# Patient Record
Sex: FEMALE | Race: WHITE | NOT HISPANIC OR LATINO | Employment: FULL TIME | URBAN - METROPOLITAN AREA
[De-identification: names, ages, dates, MRNs, and addresses within clinical notes are randomized per-mention and may not be internally consistent; named-entity substitution may affect disease eponyms.]

---

## 2017-03-10 ENCOUNTER — OFFICE VISIT (OUTPATIENT)
Dept: FAMILY MEDICINE | Facility: CLINIC | Age: 42
End: 2017-03-10
Payer: COMMERCIAL

## 2017-03-10 VITALS
WEIGHT: 114 LBS | OXYGEN SATURATION: 97 % | HEIGHT: 61 IN | DIASTOLIC BLOOD PRESSURE: 60 MMHG | BODY MASS INDEX: 21.52 KG/M2 | SYSTOLIC BLOOD PRESSURE: 102 MMHG | TEMPERATURE: 99 F | HEART RATE: 77 BPM

## 2017-03-10 DIAGNOSIS — G25.81 RESTLESS LEG: ICD-10-CM

## 2017-03-10 DIAGNOSIS — G25.81 RESTLESS LEG SYNDROME: Primary | ICD-10-CM

## 2017-03-10 PROCEDURE — 99999 PR PBB SHADOW E&M-EST. PATIENT-LVL III: CPT | Mod: PBBFAC,,, | Performed by: FAMILY MEDICINE

## 2017-03-10 PROCEDURE — 1160F RVW MEDS BY RX/DR IN RCRD: CPT | Mod: S$GLB,,, | Performed by: FAMILY MEDICINE

## 2017-03-10 PROCEDURE — 99214 OFFICE O/P EST MOD 30 MIN: CPT | Mod: S$GLB,,, | Performed by: FAMILY MEDICINE

## 2017-03-10 RX ORDER — ROPINIROLE 0.5 MG/1
0.5 TABLET, FILM COATED ORAL 2 TIMES DAILY PRN
Qty: 60 TABLET | Refills: 11 | Status: SHIPPED | OUTPATIENT
Start: 2017-03-10 | End: 2018-02-16 | Stop reason: SDUPTHER

## 2017-03-10 NOTE — PROGRESS NOTES
"Subjective:       Patient ID: Rosa Maria Kay is a 42 y.o. female.    Chief Complaint: Discuss Headaches    HPI Comments: patient is here f or refills of requip. She has restless leg and is still having leg twitching and waking up. She reports improvement in rest, however.     Within \[facility]\"" [facility] she has had headaches that wake her up at night. She states they cover her entire head. She states she is under stress. She goes to sleep around 930-10 and wakes up from 12-2 and goes back to 4. She has no nausea, vomiting. She states she doesn't have blurred vision. She has no photosensitivity or phonosensitivity. She states it feels like a squeezing headache.     Review of Systems    Objective:       Vitals:    03/10/17 1446   BP: 102/60   Pulse: 77   Temp: 98.5 °F (36.9 °C)   TempSrc: Oral   SpO2: 97%   Weight: 51.7 kg (113 lb 15.7 oz)   Height: 5' 1" (1.549 m)       Physical Exam   Constitutional: She is oriented to person, place, and time. She appears well-developed and well-nourished. No distress.   HENT:   Head: Normocephalic and atraumatic.   Neck: Normal range of motion. Neck supple.   Cardiovascular: Normal rate, regular rhythm and normal heart sounds.  Exam reveals no gallop and no friction rub.    No murmur heard.  Pulmonary/Chest: Effort normal and breath sounds normal. No respiratory distress. She has no wheezes. She has no rales.   Neurological: She is alert and oriented to person, place, and time.   Skin: She is not diaphoretic.   Psychiatric: She has a normal mood and affect.       Assessment:       1. Restless leg syndrome    2. Restless leg        Plan:       Rosa Maria was seen today for discuss headaches.    Diagnoses and all orders for this visit:    Restless leg syndrome    Restless leg  -     ropinirole (REQUIP) 0.5 MG tablet; Take 1 tablet (0.5 mg total) by mouth 2 (two) times daily as needed.             increase the Requip from .25 to .5 mg and take 2 pills staggered before bedtime x 1 week. If no " improvement in sleep, take 2 pills at once about a half hour before bedtime.

## 2017-06-16 ENCOUNTER — APPOINTMENT (OUTPATIENT)
Dept: RADIOLOGY | Facility: HOSPITAL | Age: 42
End: 2017-06-16
Attending: INTERNAL MEDICINE
Payer: COMMERCIAL

## 2017-06-16 ENCOUNTER — OFFICE VISIT (OUTPATIENT)
Dept: FAMILY MEDICINE | Facility: CLINIC | Age: 42
End: 2017-06-16
Payer: COMMERCIAL

## 2017-06-16 VITALS
HEIGHT: 61 IN | DIASTOLIC BLOOD PRESSURE: 60 MMHG | TEMPERATURE: 98 F | HEART RATE: 75 BPM | OXYGEN SATURATION: 99 % | WEIGHT: 108 LBS | SYSTOLIC BLOOD PRESSURE: 102 MMHG | BODY MASS INDEX: 20.39 KG/M2

## 2017-06-16 DIAGNOSIS — Z00.00 HEALTH CARE MAINTENANCE: ICD-10-CM

## 2017-06-16 DIAGNOSIS — M79.674 TOE PAIN, RIGHT: Primary | ICD-10-CM

## 2017-06-16 DIAGNOSIS — G62.9 NEUROPATHY: ICD-10-CM

## 2017-06-16 DIAGNOSIS — Z13.220 LIPID SCREENING: ICD-10-CM

## 2017-06-16 DIAGNOSIS — M79.674 TOE PAIN, RIGHT: ICD-10-CM

## 2017-06-16 PROCEDURE — 99213 OFFICE O/P EST LOW 20 MIN: CPT | Mod: S$GLB,,, | Performed by: INTERNAL MEDICINE

## 2017-06-16 PROCEDURE — 99999 PR PBB SHADOW E&M-EST. PATIENT-LVL III: CPT | Mod: PBBFAC,,, | Performed by: INTERNAL MEDICINE

## 2017-06-16 PROCEDURE — 73630 X-RAY EXAM OF FOOT: CPT | Mod: TC,PN,RT

## 2017-06-16 PROCEDURE — 73630 X-RAY EXAM OF FOOT: CPT | Mod: 26,RT,, | Performed by: RADIOLOGY

## 2017-06-16 RX ORDER — ADAPALENE GEL USP, 0.3% 3 MG/G
GEL TOPICAL
COMMUNITY
Start: 2017-05-24 | End: 2018-10-12

## 2017-06-16 NOTE — PROGRESS NOTES
SUBJECTIVE     Chief Complaint   Patient presents with    Foot Pain     right        HPI  Rosa Maria Kay is a 42 y.o. female with multiple medical diagnoses as listed in the medical history and problem list that presents for evaluation of R foot pain(R 2nd and 3rd toes) x 2 months. Pt says she is an avid walker. She has gotten to a point where she has to take several tabs of Advil(4 tabs/day) in order to prevent pain with ambulation. Pain is like needle sticking pain and sometimes shooting at a 6-7/10 and intermittent in nature. Pt has tried a wrap which makes the pain worse. Pt has also seen improvement with use of Asper cream. Pt denies any trauma, falls, or recent heavy lifting. Denies any swelling or skin discoloration.    PAST MEDICAL HISTORY:  Past Medical History:   Diagnosis Date    Interstitial cystitis     Dr. Wheeler       PAST SURGICAL HISTORY:  History reviewed. No pertinent surgical history.    SOCIAL HISTORY:  Social History     Social History    Marital status:      Spouse name: N/A    Number of children: N/A    Years of education: N/A     Occupational History    Not on file.     Social History Main Topics    Smoking status: Never Smoker    Smokeless tobacco: Not on file    Alcohol use No    Drug use: No    Sexual activity: Yes     Partners: Male     Other Topics Concern    Not on file     Social History Narrative    No narrative on file       FAMILY HISTORY:  Family History   Problem Relation Age of Onset    Cancer Mother 44     colon       ALLERGIES AND MEDICATIONS: updated and reviewed.  Review of patient's allergies indicates:  No Known Allergies  Current Outpatient Prescriptions   Medication Sig Dispense Refill    adapalene 0.3 % GlwP       flavoxate (URISPAS) 100 mg Tab Take 100 mg by mouth once daily.  12    pentosan polysulfate (ELMIRON) 100 mg Cap Take 100 mg by mouth 3 (three) times daily.      ropinirole (REQUIP) 0.5 MG tablet Take 1 tablet (0.5 mg total) by  "mouth 2 (two) times daily as needed. 60 tablet 11    UROGESIC-BLUE 81.6-40.8-0.12 mg Tab Take 1 tablet by mouth daily as needed.  11     No current facility-administered medications for this visit.        ROS  Review of Systems   Constitutional: Negative for chills and fever.   HENT: Negative for hearing loss and sore throat.    Eyes: Negative for visual disturbance.   Respiratory: Negative for cough and shortness of breath.    Cardiovascular: Negative for chest pain, palpitations and leg swelling.   Gastrointestinal: Negative for abdominal pain, constipation, diarrhea, nausea and vomiting.   Genitourinary: Negative for dysuria, frequency and urgency.   Musculoskeletal: Negative for arthralgias, joint swelling and myalgias.   Skin: Negative for rash and wound.   Neurological: Negative for headaches.   Psychiatric/Behavioral: Negative for agitation and confusion. The patient is not nervous/anxious.          OBJECTIVE     Physical Exam  Vitals:    06/16/17 1436   BP: 102/60   Pulse: 75   Temp: 98.3 °F (36.8 °C)    Body mass index is 20.41 kg/m².  Weight: 49 kg (108 lb 0.4 oz)   Height: 5' 1" (154.9 cm)     Physical Exam   Constitutional: She is oriented to person, place, and time. She appears well-developed and well-nourished. No distress.   HENT:   Head: Normocephalic and atraumatic.   Right Ear: External ear normal.   Left Ear: External ear normal.   Nose: Nose normal.   Mouth/Throat: Oropharynx is clear and moist.   Eyes: Conjunctivae and EOM are normal. Right eye exhibits no discharge. Left eye exhibits no discharge. No scleral icterus.   Neck: Normal range of motion. Neck supple. No JVD present. No tracheal deviation present.   Cardiovascular: Normal rate, regular rhythm and intact distal pulses.  Exam reveals no gallop and no friction rub.    No murmur heard.  Pulmonary/Chest: Effort normal and breath sounds normal. No respiratory distress. She has no wheezes.   Abdominal: Soft. Bowel sounds are normal. She " exhibits no distension and no mass. There is no tenderness. There is no rebound and no guarding.   Musculoskeletal: Normal range of motion. She exhibits no edema, tenderness or deformity.   Neurological: She is alert and oriented to person, place, and time. She exhibits normal muscle tone. Coordination normal.   Skin: Skin is warm and dry. No rash noted. No erythema.   Psychiatric: She has a normal mood and affect. Her behavior is normal. Judgment and thought content normal.         Health Maintenance       Date Due Completion Date    Lipid Panel 1975 ---    TETANUS VACCINE 01/30/1995 ---    Mammogram 07/07/2017 7/7/2015    Influenza Vaccine 08/01/2017 ---    Pap Smear with HPV Cotest 06/23/2018 6/23/2015            ASSESSMENT     42 y.o. female with     1. Toe pain, right    2. Lipid screening    3. Neuropathy    4. Health care maintenance        PLAN:     1. Toe pain, right  - Pain likely 2/2 neuropathy, but plan for foot xray to r/o any acute bony process  - Continue OTC NSAIDs(Aleve, Ibuprofen, Advil, or Motrin) prn pain    2. Lipid screening  - Lipid panel; Future    3. Neuropathy  - Likely cause of pt's R 2nd and 3rd toe pain; plan for workup with labs as below  - TSH; Future  - Vitamin B12; Future  - Folate; Future  - Protein electrophoresis, serum; Future  - Hemoglobin A1c; Future  - YINA; Future  - Sedimentation rate, manual; Future  - RPR; Future    4. Health care maintenance  - CBC auto differential; Future  - Comprehensive metabolic panel; Future        RTC in 2 weeks     Allie Mckeon MD  06/16/2017 2:42 PM        No Follow-up on file.

## 2017-06-20 ENCOUNTER — LAB VISIT (OUTPATIENT)
Dept: LAB | Facility: HOSPITAL | Age: 42
End: 2017-06-20
Attending: FAMILY MEDICINE
Payer: COMMERCIAL

## 2017-06-20 DIAGNOSIS — G62.9 NEUROPATHY: ICD-10-CM

## 2017-06-20 DIAGNOSIS — Z13.220 LIPID SCREENING: ICD-10-CM

## 2017-06-20 DIAGNOSIS — Z00.00 HEALTH CARE MAINTENANCE: ICD-10-CM

## 2017-06-20 LAB
ALBUMIN SERPL BCP-MCNC: 3.8 G/DL
ALP SERPL-CCNC: 51 U/L
ALT SERPL W/O P-5'-P-CCNC: 14 U/L
ANION GAP SERPL CALC-SCNC: 7 MMOL/L
AST SERPL-CCNC: 16 U/L
BASOPHILS # BLD AUTO: 0.02 K/UL
BASOPHILS NFR BLD: 0.6 %
BILIRUB SERPL-MCNC: 0.2 MG/DL
BUN SERPL-MCNC: 11 MG/DL
CALCIUM SERPL-MCNC: 9.7 MG/DL
CHLORIDE SERPL-SCNC: 106 MMOL/L
CHOLEST/HDLC SERPL: 2.1 {RATIO}
CO2 SERPL-SCNC: 26 MMOL/L
CREAT SERPL-MCNC: 0.9 MG/DL
DIFFERENTIAL METHOD: ABNORMAL
EOSINOPHIL # BLD AUTO: 0 K/UL
EOSINOPHIL NFR BLD: 0.3 %
ERYTHROCYTE [DISTWIDTH] IN BLOOD BY AUTOMATED COUNT: 15.6 %
ERYTHROCYTE [SEDIMENTATION RATE] IN BLOOD BY WESTERGREN METHOD: 25 MM/HR
EST. GFR  (AFRICAN AMERICAN): >60 ML/MIN/1.73 M^2
EST. GFR  (NON AFRICAN AMERICAN): >60 ML/MIN/1.73 M^2
FOLATE SERPL-MCNC: 16.1 NG/ML
GLUCOSE SERPL-MCNC: 81 MG/DL
HCT VFR BLD AUTO: 31.6 %
HDL/CHOLESTEROL RATIO: 48.6 %
HDLC SERPL-MCNC: 107 MG/DL
HDLC SERPL-MCNC: 220 MG/DL
HGB BLD-MCNC: 10.4 G/DL
LDLC SERPL CALC-MCNC: 105.8 MG/DL
LYMPHOCYTES # BLD AUTO: 1.5 K/UL
LYMPHOCYTES NFR BLD: 43.3 %
MCH RBC QN AUTO: 29.1 PG
MCHC RBC AUTO-ENTMCNC: 32.9 %
MCV RBC AUTO: 89 FL
MONOCYTES # BLD AUTO: 0.3 K/UL
MONOCYTES NFR BLD: 9.7 %
NEUTROPHILS # BLD AUTO: 1.6 K/UL
NEUTROPHILS NFR BLD: 45.8 %
NONHDLC SERPL-MCNC: 113 MG/DL
PLATELET # BLD AUTO: 252 K/UL
PMV BLD AUTO: 10.6 FL
POTASSIUM SERPL-SCNC: 4.2 MMOL/L
PROT SERPL-MCNC: 7.1 G/DL
RBC # BLD AUTO: 3.57 M/UL
SODIUM SERPL-SCNC: 139 MMOL/L
TRIGL SERPL-MCNC: 36 MG/DL
TSH SERPL DL<=0.005 MIU/L-ACNC: 1.61 UIU/ML
VIT B12 SERPL-MCNC: 1462 PG/ML
WBC # BLD AUTO: 3.49 K/UL

## 2017-06-20 PROCEDURE — 80061 LIPID PANEL: CPT

## 2017-06-20 PROCEDURE — 84443 ASSAY THYROID STIM HORMONE: CPT

## 2017-06-20 PROCEDURE — 82746 ASSAY OF FOLIC ACID SERUM: CPT

## 2017-06-20 PROCEDURE — 85025 COMPLETE CBC W/AUTO DIFF WBC: CPT

## 2017-06-20 PROCEDURE — 83036 HEMOGLOBIN GLYCOSYLATED A1C: CPT

## 2017-06-20 PROCEDURE — 80053 COMPREHEN METABOLIC PANEL: CPT

## 2017-06-20 PROCEDURE — 86038 ANTINUCLEAR ANTIBODIES: CPT

## 2017-06-20 PROCEDURE — 85651 RBC SED RATE NONAUTOMATED: CPT

## 2017-06-20 PROCEDURE — 86235 NUCLEAR ANTIGEN ANTIBODY: CPT | Mod: 59

## 2017-06-20 PROCEDURE — 84165 PROTEIN E-PHORESIS SERUM: CPT | Mod: 26,,, | Performed by: PATHOLOGY

## 2017-06-20 PROCEDURE — 36415 COLL VENOUS BLD VENIPUNCTURE: CPT

## 2017-06-20 PROCEDURE — 82607 VITAMIN B-12: CPT

## 2017-06-20 PROCEDURE — 86592 SYPHILIS TEST NON-TREP QUAL: CPT

## 2017-06-20 PROCEDURE — 86039 ANTINUCLEAR ANTIBODIES (ANA): CPT

## 2017-06-20 PROCEDURE — 84165 PROTEIN E-PHORESIS SERUM: CPT

## 2017-06-21 ENCOUNTER — PATIENT MESSAGE (OUTPATIENT)
Dept: FAMILY MEDICINE | Facility: CLINIC | Age: 42
End: 2017-06-21

## 2017-06-21 LAB
ALBUMIN SERPL ELPH-MCNC: 4.12 G/DL
ALPHA1 GLOB SERPL ELPH-MCNC: 0.24 G/DL
ALPHA2 GLOB SERPL ELPH-MCNC: 0.5 G/DL
ANA SER QL IF: POSITIVE
ANA TITR SER IF: NORMAL {TITER}
B-GLOBULIN SERPL ELPH-MCNC: 0.8 G/DL
ESTIMATED AVG GLUCOSE: 111 MG/DL
GAMMA GLOB SERPL ELPH-MCNC: 1.24 G/DL
HBA1C MFR BLD HPLC: 5.5 %
PROT SERPL-MCNC: 6.9 G/DL
RPR SER QL: NORMAL

## 2017-06-22 LAB — PATHOLOGIST INTERPRETATION SPE: NORMAL

## 2017-06-23 DIAGNOSIS — R76.8 ANA POSITIVE: Primary | ICD-10-CM

## 2017-06-23 DIAGNOSIS — D64.9 NORMOCYTIC ANEMIA: ICD-10-CM

## 2017-06-23 LAB
ANTI SM ANTIBODY: 1.12 EU
ANTI SM/RNP ANTIBODY: 0.62 EU
ANTI-SM INTERPRETATION: NEGATIVE
ANTI-SM/RNP INTERPRETATION: NEGATIVE
ANTI-SSA ANTIBODY: 1.5 EU
ANTI-SSA INTERPRETATION: NEGATIVE
ANTI-SSB ANTIBODY: 0.29 EU
ANTI-SSB INTERPRETATION: NEGATIVE
DSDNA AB SER-ACNC: NORMAL [IU]/ML

## 2017-06-27 ENCOUNTER — TELEPHONE (OUTPATIENT)
Dept: FAMILY MEDICINE | Facility: CLINIC | Age: 42
End: 2017-06-27

## 2017-06-27 NOTE — TELEPHONE ENCOUNTER
----- Message from Grace Hart sent at 6/27/2017  3:44 PM CDT -----  REFERRAL: Pt scheduled to see Dr. Weeks on 9/12/17 @ 3pm.

## 2017-06-30 ENCOUNTER — LAB VISIT (OUTPATIENT)
Dept: LAB | Facility: HOSPITAL | Age: 42
End: 2017-06-30
Attending: FAMILY MEDICINE
Payer: COMMERCIAL

## 2017-06-30 DIAGNOSIS — D64.9 ANEMIA, UNSPECIFIED: Primary | ICD-10-CM

## 2017-06-30 DIAGNOSIS — D64.9 ANEMIA, UNSPECIFIED: ICD-10-CM

## 2017-06-30 LAB
FERRITIN SERPL-MCNC: 7 NG/ML
FOLATE SERPL-MCNC: 14.8 NG/ML
IRON SERPL-MCNC: 43 UG/DL
SATURATED IRON: 9 %
TOTAL IRON BINDING CAPACITY: 475 UG/DL
TRANSFERRIN SERPL-MCNC: 321 MG/DL
VIT B12 SERPL-MCNC: 1065 PG/ML

## 2017-06-30 PROCEDURE — 82728 ASSAY OF FERRITIN: CPT

## 2017-06-30 PROCEDURE — 36415 COLL VENOUS BLD VENIPUNCTURE: CPT

## 2017-06-30 PROCEDURE — 84466 ASSAY OF TRANSFERRIN: CPT

## 2017-06-30 PROCEDURE — 83540 ASSAY OF IRON: CPT

## 2017-06-30 PROCEDURE — 82746 ASSAY OF FOLIC ACID SERUM: CPT

## 2017-06-30 PROCEDURE — 82607 VITAMIN B-12: CPT

## 2017-09-15 ENCOUNTER — LAB VISIT (OUTPATIENT)
Dept: LAB | Facility: HOSPITAL | Age: 42
End: 2017-09-15
Attending: INTERNAL MEDICINE
Payer: COMMERCIAL

## 2017-09-15 ENCOUNTER — OFFICE VISIT (OUTPATIENT)
Dept: RHEUMATOLOGY | Facility: CLINIC | Age: 42
End: 2017-09-15
Payer: COMMERCIAL

## 2017-09-15 VITALS
HEART RATE: 72 BPM | WEIGHT: 108.31 LBS | HEIGHT: 61 IN | RESPIRATION RATE: 18 BRPM | SYSTOLIC BLOOD PRESSURE: 102 MMHG | DIASTOLIC BLOOD PRESSURE: 71 MMHG | BODY MASS INDEX: 20.45 KG/M2

## 2017-09-15 DIAGNOSIS — G89.29 CHRONIC PAIN IN RIGHT FOOT: Primary | ICD-10-CM

## 2017-09-15 DIAGNOSIS — R20.2 PARESTHESIA OF RIGHT FOOT: ICD-10-CM

## 2017-09-15 DIAGNOSIS — G89.29 CHRONIC PAIN IN RIGHT FOOT: ICD-10-CM

## 2017-09-15 DIAGNOSIS — M79.671 CHRONIC PAIN IN RIGHT FOOT: ICD-10-CM

## 2017-09-15 DIAGNOSIS — M79.671 CHRONIC PAIN IN RIGHT FOOT: Primary | ICD-10-CM

## 2017-09-15 LAB
BASOPHILS # BLD AUTO: 0.01 K/UL
BASOPHILS NFR BLD: 0.2 %
CCP AB SER IA-ACNC: 1.2 U/ML
CRP SERPL-MCNC: 1.5 MG/L
DIFFERENTIAL METHOD: ABNORMAL
EOSINOPHIL # BLD AUTO: 0 K/UL
EOSINOPHIL NFR BLD: 0.2 %
ERYTHROCYTE [DISTWIDTH] IN BLOOD BY AUTOMATED COUNT: 14.3 %
ERYTHROCYTE [SEDIMENTATION RATE] IN BLOOD BY WESTERGREN METHOD: 27 MM/HR
HCT VFR BLD AUTO: 35.9 %
HGB BLD-MCNC: 12 G/DL
LYMPHOCYTES # BLD AUTO: 1.5 K/UL
LYMPHOCYTES NFR BLD: 34.6 %
MCH RBC QN AUTO: 29.9 PG
MCHC RBC AUTO-ENTMCNC: 33.4 G/DL
MCV RBC AUTO: 90 FL
MONOCYTES # BLD AUTO: 0.3 K/UL
MONOCYTES NFR BLD: 6.2 %
NEUTROPHILS # BLD AUTO: 2.5 K/UL
NEUTROPHILS NFR BLD: 58.6 %
PLATELET # BLD AUTO: 245 K/UL
PMV BLD AUTO: 10.4 FL
RBC # BLD AUTO: 4.01 M/UL
RHEUMATOID FACT SERPL-ACNC: <10 IU/ML
WBC # BLD AUTO: 4.19 K/UL

## 2017-09-15 PROCEDURE — 3008F BODY MASS INDEX DOCD: CPT | Mod: S$GLB,,, | Performed by: INTERNAL MEDICINE

## 2017-09-15 PROCEDURE — 99204 OFFICE O/P NEW MOD 45 MIN: CPT | Mod: S$GLB,,, | Performed by: INTERNAL MEDICINE

## 2017-09-15 PROCEDURE — 85025 COMPLETE CBC W/AUTO DIFF WBC: CPT

## 2017-09-15 PROCEDURE — 99999 PR PBB SHADOW E&M-EST. PATIENT-LVL III: CPT | Mod: PBBFAC,,, | Performed by: INTERNAL MEDICINE

## 2017-09-15 PROCEDURE — 86140 C-REACTIVE PROTEIN: CPT

## 2017-09-15 PROCEDURE — 36415 COLL VENOUS BLD VENIPUNCTURE: CPT

## 2017-09-15 PROCEDURE — 86200 CCP ANTIBODY: CPT

## 2017-09-15 PROCEDURE — 86431 RHEUMATOID FACTOR QUANT: CPT

## 2017-09-15 PROCEDURE — 85651 RBC SED RATE NONAUTOMATED: CPT

## 2017-09-15 NOTE — LETTER
September 15, 2017      Allie Mckeon MD  [address] [address]  [address]  [place] [address]           [facility] - Rheumatology  [address]  [place] [address]  Phone: [phone]  Fax: [phone]          Patient: Rosa Maria Kay   MR Number: [id number]   YOB: 1975   Date of Visit: 9/15/2017       Dear Dr. Allie Mckeon:    Thank you for referring Rosa Maria Kay to me for evaluation. Attached you will find relevant portions of my assessment and plan of care.    If you have questions, please do not hesitate to call me. I look forward to following Rosa Maria Kay along with you.    Sincerely,    Lakisha Weeks MD    Enclosure  CC:  No Recipients    If you would like to receive this communication electronically, please contact [email or web address] or [phone] to request more information on [facility] access.    For providers and/or their staff who would like to refer a patient to [facility], please contact us through our one-stop-shop provider referral line, [facility], at [phone].    If you feel you have received this communication in error or would no longer like to receive these types of communications, please e-mail [email or web address]

## 2017-09-18 ENCOUNTER — PATIENT MESSAGE (OUTPATIENT)
Dept: RHEUMATOLOGY | Facility: CLINIC | Age: 42
End: 2017-09-18

## 2017-09-29 ENCOUNTER — HOSPITAL ENCOUNTER (OUTPATIENT)
Dept: RADIOLOGY | Facility: HOSPITAL | Age: 42
Discharge: HOME | End: 2017-09-29
Attending: INTERNAL MEDICINE
Payer: COMMERCIAL

## 2017-09-29 DIAGNOSIS — R20.2 PARESTHESIA OF RIGHT FOOT: ICD-10-CM

## 2017-09-29 DIAGNOSIS — M79.671 CHRONIC PAIN IN RIGHT FOOT: ICD-10-CM

## 2017-09-29 DIAGNOSIS — G89.29 CHRONIC PAIN IN RIGHT FOOT: ICD-10-CM

## 2017-09-29 PROCEDURE — 73720 MRI LWR EXTREMITY W/O&W/DYE: CPT | Mod: 26,RT,, | Performed by: RADIOLOGY

## 2017-09-29 PROCEDURE — 25500020 PHARM REV CODE 255: Performed by: INTERNAL MEDICINE

## 2017-09-29 PROCEDURE — 73720 MRI LWR EXTREMITY W/O&W/DYE: CPT | Mod: TC,RT

## 2017-09-29 PROCEDURE — A9585 GADOBUTROL INJECTION: HCPCS | Performed by: INTERNAL MEDICINE

## 2017-09-29 RX ORDER — GADOBUTROL 604.72 MG/ML
5 INJECTION INTRAVENOUS
Status: COMPLETED | OUTPATIENT
Start: 2017-09-29 | End: 2017-09-29

## 2017-09-29 RX ADMIN — GADOBUTROL 5 ML: 604.72 INJECTION INTRAVENOUS at 04:09

## 2017-10-11 ENCOUNTER — PATIENT MESSAGE (OUTPATIENT)
Dept: RHEUMATOLOGY | Facility: CLINIC | Age: 42
End: 2017-10-11

## 2017-10-11 ENCOUNTER — TELEPHONE (OUTPATIENT)
Dept: RHEUMATOLOGY | Facility: CLINIC | Age: 42
End: 2017-10-11

## 2017-10-11 RX ORDER — PREDNISONE 20 MG/1
20 TABLET ORAL DAILY
Qty: 10 TABLET | Refills: 0 | Status: SHIPPED | OUTPATIENT
Start: 2017-10-11 | End: 2017-10-21

## 2017-10-13 ENCOUNTER — TELEPHONE (OUTPATIENT)
Dept: RHEUMATOLOGY | Facility: CLINIC | Age: 42
End: 2017-10-13

## 2017-11-10 ENCOUNTER — HOSPITAL ENCOUNTER (OUTPATIENT)
Dept: RADIOLOGY | Facility: HOSPITAL | Age: 42
Discharge: HOME | End: 2017-11-10
Attending: FAMILY MEDICINE
Payer: COMMERCIAL

## 2017-11-10 DIAGNOSIS — Z12.31 ENCOUNTER FOR SCREENING MAMMOGRAM FOR BREAST CANCER: ICD-10-CM

## 2017-11-10 PROCEDURE — 77067 SCR MAMMO BI INCL CAD: CPT | Mod: TC

## 2017-11-10 PROCEDURE — 77063 BREAST TOMOSYNTHESIS BI: CPT | Mod: 26,,, | Performed by: RADIOLOGY

## 2017-11-10 PROCEDURE — 77067 SCR MAMMO BI INCL CAD: CPT | Mod: 26,,, | Performed by: RADIOLOGY

## 2017-12-26 ENCOUNTER — OFFICE VISIT (OUTPATIENT)
Dept: RHEUMATOLOGY | Facility: CLINIC | Age: 42
End: 2017-12-26
Payer: COMMERCIAL

## 2017-12-26 VITALS
WEIGHT: 113.31 LBS | SYSTOLIC BLOOD PRESSURE: 101 MMHG | BODY MASS INDEX: 21.39 KG/M2 | RESPIRATION RATE: 18 BRPM | DIASTOLIC BLOOD PRESSURE: 71 MMHG | HEIGHT: 61 IN | HEART RATE: 81 BPM

## 2017-12-26 DIAGNOSIS — M19.90 INFLAMMATORY ARTHRITIS: Primary | ICD-10-CM

## 2017-12-26 PROCEDURE — 99999 PR PBB SHADOW E&M-EST. PATIENT-LVL III: CPT | Mod: PBBFAC,,, | Performed by: INTERNAL MEDICINE

## 2017-12-26 PROCEDURE — 99213 OFFICE O/P EST LOW 20 MIN: CPT | Mod: S$GLB,,, | Performed by: INTERNAL MEDICINE

## 2017-12-26 NOTE — PROGRESS NOTES
Subjective:       Patient ID: Rosa Maria Kay is a 42 y.o. female.    Chief Complaint: Disease Management    HPI  41 yo F with PMH of RLS and interstitial cystitis for evaluation of YINA.  Reports that she has numbness sensation in right second and third toes bilaterally. Denies any swelling or stiffness.  Reports her symptoms have got worse. Reports her pain level is today is 2/10. Sometimes, she was diagnosed with RLS 2 years ago. She had crawling sensation in right leg and it was only at night.  Reports that the requip helped. Denies raynauds, oral ulcers, hair loss, or photosensitivity. Denies fevers or pleurisy.  Reports she wakes up really hot at night.  Denies triggers for her numbness. Denies any alleviating factors. She takes advil at night which helps her with improvement.  Reports that pain is worse in right side.      Interval history: Denies joint pain. Reports numbness sensation between second and third toe on right foot, off and on.  Reports she took prednisone but does not notice improvement.  Denies stiffness in feet and hands.   Reports she runs 3 miles a day.    Past Medical History:   Diagnosis Date    Interstitial cystitis     Dr. Wheeler       Review of Systems   Constitutional: Negative for activity change, appetite change, chills, diaphoresis, fatigue, fever and unexpected weight change.   HENT: Negative for congestion, ear discharge, ear pain, facial swelling, mouth sores, sinus pressure, sneezing, sore throat, tinnitus and trouble swallowing.    Eyes: Negative for photophobia, pain, discharge, redness, itching and visual disturbance.   Respiratory: Negative for apnea, cough, chest tightness, shortness of breath, wheezing and stridor.    Cardiovascular: Negative for chest pain and leg swelling.   Gastrointestinal: Negative for abdominal distention, abdominal pain, anal bleeding, blood in stool, constipation, diarrhea and nausea.   Endocrine: Negative for cold intolerance and heat  intolerance.   Genitourinary: Negative for difficulty urinating, dysuria and genital sores.   Musculoskeletal: Positive for arthralgias. Negative for back pain, gait problem, joint swelling, myalgias, neck pain and neck stiffness.   Skin: Negative for color change, pallor, rash and wound.   Neurological: Positive for headaches. Negative for dizziness and seizures.   Hematological: Negative for adenopathy. Does not bruise/bleed easily.   Psychiatric/Behavioral: Negative for sleep disturbance. The patient is not nervous/anxious.            Objective:        Physical Exam   Constitutional: She is oriented to person, place, and time.   HENT:   Head: Normocephalic and atraumatic.   Right Ear: External ear normal.   Left Ear: External ear normal.   Nose: Nose normal.   Mouth/Throat: Oropharynx is clear and moist. No oropharyngeal exudate.   Eyes: Conjunctivae and EOM are normal. Pupils are equal, round, and reactive to light. Right eye exhibits no discharge. Left eye exhibits no discharge. No scleral icterus.   Neck: Neck supple. No JVD present. No thyromegaly present.   Cardiovascular: Normal rate, regular rhythm, normal heart sounds and intact distal pulses.  Exam reveals no gallop and no friction rub.    No murmur heard.  Pulmonary/Chest: Effort normal and breath sounds normal. No respiratory distress. She has no wheezes. She has no rales. She exhibits no tenderness.   Abdominal: Soft. Bowel sounds are normal. She exhibits no distension and no mass. There is no tenderness. There is no rebound and no guarding.   Lymphadenopathy:     She has no cervical adenopathy.   Neurological: She is alert and oriented to person, place, and time. No cranial nerve deficit. Gait normal. Coordination normal.   Skin: Skin is dry. No rash noted. No erythema. No pallor.     Psychiatric: Affect and judgment normal.   Musculoskeletal: She exhibits no edema, tenderness or deformity.          labs: reviewed   ccp,rf-negative  Carlita: +  MRI Lower  Extremity W WO Contrast Right (2017):.  Minimal erosive change involving the head of the first metacarpal.  No additional findings to suggest inflammatory arthropathy of the right forefoot.  Short-term followup is suggested.  2.  Minimal enhancement of the flexor tendon sheaths.  The findings may be seen with synovitis             Assessment:     41 yo F with PMH of RLS and interstitial cystitis for evaluation of YINA.  Patient does not have any signs or symptoms of SLE.  Her MRI showed minimal erosive change in right foot. Patient is avoid runner and is currently asymptomatic.  Discussed that she likely has early RA so we will monitor given her lack of symptoms.      Plan:       Labs and follow up in 6 months

## 2018-02-14 DIAGNOSIS — G25.81 RESTLESS LEG: ICD-10-CM

## 2018-02-15 RX ORDER — ROPINIROLE 0.5 MG/1
TABLET, FILM COATED ORAL
Qty: 60 TABLET | OUTPATIENT
Start: 2018-02-15

## 2018-02-16 ENCOUNTER — OFFICE VISIT (OUTPATIENT)
Dept: FAMILY MEDICINE | Facility: CLINIC | Age: 43
End: 2018-02-16
Payer: COMMERCIAL

## 2018-02-16 VITALS
WEIGHT: 112.63 LBS | HEIGHT: 61 IN | DIASTOLIC BLOOD PRESSURE: 60 MMHG | TEMPERATURE: 98 F | SYSTOLIC BLOOD PRESSURE: 96 MMHG | OXYGEN SATURATION: 99 % | HEART RATE: 61 BPM | BODY MASS INDEX: 21.27 KG/M2 | RESPIRATION RATE: 16 BRPM

## 2018-02-16 DIAGNOSIS — G25.81 RESTLESS LEG: ICD-10-CM

## 2018-02-16 PROCEDURE — 99213 OFFICE O/P EST LOW 20 MIN: CPT | Mod: S$GLB,,, | Performed by: PHYSICIAN ASSISTANT

## 2018-02-16 PROCEDURE — 99999 PR PBB SHADOW E&M-EST. PATIENT-LVL IV: CPT | Mod: PBBFAC,,, | Performed by: PHYSICIAN ASSISTANT

## 2018-02-16 PROCEDURE — 3008F BODY MASS INDEX DOCD: CPT | Mod: S$GLB,,, | Performed by: PHYSICIAN ASSISTANT

## 2018-02-16 RX ORDER — ROPINIROLE 0.5 MG/1
0.5 TABLET, FILM COATED ORAL 2 TIMES DAILY PRN
Qty: 60 TABLET | Refills: 11 | Status: SHIPPED | OUTPATIENT
Start: 2018-02-16 | End: 2019-02-13 | Stop reason: SDUPTHER

## 2018-02-16 NOTE — PROGRESS NOTES

## 2018-02-16 NOTE — PROGRESS NOTES
Subjective:       Patient ID: Rosa Maria Kay is a 43 y.o. female with multiple medical diagnoses as listed in the medical history and problem list that presents for Medication Refill (requip)  .    Chief Complaint: Medication Refill (requip)      HPI   Pt here to follow up for refill for RLS. She has been taking it since about 2015. She takes 2 tablets at bed with good relief.   She had labs last with Dr. Mckeon in June.       Review of Systems   Constitutional: Negative for activity change and unexpected weight change.   HENT: Negative for hearing loss, rhinorrhea and trouble swallowing.    Eyes: Negative for discharge and visual disturbance.   Respiratory: Negative for chest tightness and wheezing.    Cardiovascular: Negative for chest pain and palpitations.   Gastrointestinal: Negative for blood in stool, constipation, diarrhea and vomiting.   Endocrine: Negative for polydipsia and polyuria.   Genitourinary: Negative for difficulty urinating, dysuria, hematuria and menstrual problem.   Musculoskeletal: Positive for arthralgias. Negative for joint swelling and neck pain.   Neurological: Negative for weakness and headaches.   Psychiatric/Behavioral: Negative for confusion and dysphoric mood.         PAST MEDICAL HISTORY:  Past Medical History:   Diagnosis Date    Interstitial cystitis     Dr. Wheeler       SOCIAL HISTORY:  Social History     Social History    Marital status:      Spouse name: N/A    Number of children: N/A    Years of education: N/A     Occupational History    Not on file.     Social History Main Topics    Smoking status: Never Smoker    Smokeless tobacco: Never Used    Alcohol use No    Drug use: No    Sexual activity: Yes     Partners: Male     Other Topics Concern    Not on file     Social History Narrative    No narrative on file       ALLERGIES AND MEDICATIONS: updated and reviewed.  Review of patient's allergies indicates:  No Known Allergies  Current Outpatient  "Prescriptions   Medication Sig Dispense Refill    adapalene 0.3 % GlwP       flavoxate (URISPAS) 100 mg Tab Take 100 mg by mouth once daily.  12    pentosan polysulfate (ELMIRON) 100 mg Cap Take 100 mg by mouth 3 (three) times daily.      rOPINIRole (REQUIP) 0.5 MG tablet Take 1 tablet (0.5 mg total) by mouth 2 (two) times daily as needed. 60 tablet 11    UROGESIC-BLUE 81.6-40.8-0.12 mg Tab Take 1 tablet by mouth daily as needed.  11     No current facility-administered medications for this visit.          Objective:   BP 96/60   Pulse 61   Temp 98.2 °F (36.8 °C) (Oral)   Resp 16   Ht 5' 1" (1.549 m)   Wt 51.1 kg (112 lb 10.5 oz)   SpO2 99%   BMI 21.29 kg/m²      Physical Exam   Constitutional: She is oriented to person, place, and time. No distress.   Cardiovascular: Normal rate and regular rhythm.    Pulmonary/Chest: Effort normal and breath sounds normal.   Neurological: She is alert and oriented to person, place, and time.   Skin: Skin is warm. No erythema.           Assessment:       1. Restless leg        Plan:       Restless leg  -     rOPINIRole (REQUIP) 0.5 MG tablet; Take 1 tablet (0.5 mg total) by mouth 2 (two) times daily as needed.  Dispense: 60 tablet; Refill: 11            No Follow-up on file.  "

## 2018-06-07 ENCOUNTER — PATIENT MESSAGE (OUTPATIENT)
Dept: RHEUMATOLOGY | Facility: CLINIC | Age: 43
End: 2018-06-07

## 2018-10-12 ENCOUNTER — OFFICE VISIT (OUTPATIENT)
Dept: FAMILY MEDICINE | Facility: CLINIC | Age: 43
End: 2018-10-12
Payer: COMMERCIAL

## 2018-10-12 ENCOUNTER — LAB VISIT (OUTPATIENT)
Dept: LAB | Facility: HOSPITAL | Age: 43
End: 2018-10-12
Attending: FAMILY MEDICINE
Payer: COMMERCIAL

## 2018-10-12 VITALS
WEIGHT: 111.56 LBS | SYSTOLIC BLOOD PRESSURE: 100 MMHG | DIASTOLIC BLOOD PRESSURE: 70 MMHG | BODY MASS INDEX: 20.53 KG/M2 | TEMPERATURE: 98 F | HEART RATE: 69 BPM | HEIGHT: 62 IN | OXYGEN SATURATION: 99 %

## 2018-10-12 DIAGNOSIS — M79.642 BILATERAL HAND PAIN: ICD-10-CM

## 2018-10-12 DIAGNOSIS — M79.641 BILATERAL HAND PAIN: ICD-10-CM

## 2018-10-12 DIAGNOSIS — R76.8 POSITIVE ANA (ANTINUCLEAR ANTIBODY): Primary | ICD-10-CM

## 2018-10-12 LAB
CRP SERPL-MCNC: 1.3 MG/L
ERYTHROCYTE [SEDIMENTATION RATE] IN BLOOD BY WESTERGREN METHOD: 15 MM/HR

## 2018-10-12 PROCEDURE — 86200 CCP ANTIBODY: CPT

## 2018-10-12 PROCEDURE — 36415 COLL VENOUS BLD VENIPUNCTURE: CPT

## 2018-10-12 PROCEDURE — 99999 PR PBB SHADOW E&M-EST. PATIENT-LVL III: CPT | Mod: PBBFAC,,, | Performed by: FAMILY MEDICINE

## 2018-10-12 PROCEDURE — 86140 C-REACTIVE PROTEIN: CPT

## 2018-10-12 PROCEDURE — 99214 OFFICE O/P EST MOD 30 MIN: CPT | Mod: S$GLB,,, | Performed by: FAMILY MEDICINE

## 2018-10-12 PROCEDURE — 85652 RBC SED RATE AUTOMATED: CPT

## 2018-10-12 PROCEDURE — 3008F BODY MASS INDEX DOCD: CPT | Mod: CPTII,S$GLB,, | Performed by: FAMILY MEDICINE

## 2018-10-12 PROCEDURE — 86038 ANTINUCLEAR ANTIBODIES: CPT

## 2018-10-12 RX ORDER — METHYLPREDNISOLONE 4 MG/1
TABLET ORAL
Qty: 1 PACKAGE | Refills: 0 | Status: SHIPPED | OUTPATIENT
Start: 2018-10-12 | End: 2018-11-02

## 2018-10-12 NOTE — PROGRESS NOTES
Subjective:       Patient ID: Rosa Maria Kay is a 43 y.o. female.    Chief Complaint: Discuss Joint Pains/ Hands    Patient presents with achiness, swelling,aand tightness in her hands and fingers. She states she saw the rheumatologist and it is getting worse. She states her left hand is worse than the other as far as pain is concerned. She states her knuckles are swollen as well. She has limited her salt intake to help. She is taking 4 Advil at night, which helps. She states she couldn't see the rheumatologist until December 4th.       Past Medical History:  No date: Interstitial cystitis      Comment:  Dr. Wheeler   History reviewed. No pertinent surgical history.  Review of patient's family history indicates:  Problem: Cancer      Relation: Mother          Age of Onset: 44          Comment: colon    Social History    Socioeconomic History      Marital status:       Spouse name: Not on file      Number of children: Not on file      Years of education: Not on file      Highest education level: Not on file    Social Needs      Financial resource strain: Not on file      Food insecurity - worry: Not on file      Food insecurity - inability: Not on file      Transportation needs - medical: Not on file      Transportation needs - non-medical: Not on file    Occupational History      Not on file    Tobacco Use      Smoking status: Never Smoker      Smokeless tobacco: Never Used    Substance and Sexual Activity      Alcohol use: No      Drug use: No      Sexual activity: Yes        Partners: Male    Other Topics      Concerns:        Not on file    Social History Narrative      Not on file          Review of Systems   Constitutional: Negative for activity change and unexpected weight change.   HENT: Positive for postnasal drip. Negative for hearing loss, rhinorrhea and trouble swallowing.    Eyes: Negative for discharge and visual disturbance.   Respiratory: Negative for chest tightness and wheezing.   "  Cardiovascular: Negative for chest pain and palpitations.   Gastrointestinal: Positive for vomiting. Negative for blood in stool, constipation and diarrhea.   Endocrine: Negative for polydipsia and polyuria.   Genitourinary: Negative for difficulty urinating, dysuria, hematuria and menstrual problem.   Musculoskeletal: Positive for arthralgias and joint swelling. Negative for neck pain.   Neurological: Negative for weakness and headaches.   Psychiatric/Behavioral: Negative for confusion and dysphoric mood.       Objective:       Vitals:    10/12/18 1135   BP: 100/70   Pulse: 69   Temp: 98.2 °F (36.8 °C)   TempSrc: Oral   SpO2: 99%   Weight: 50.6 kg (111 lb 8.8 oz)   Height: 5' 1.5" (1.562 m)       Physical Exam   Constitutional: She is oriented to person, place, and time. She appears well-developed and well-nourished. No distress.   HENT:   Head: Normocephalic and atraumatic.   Neck: Normal range of motion. Neck supple.   Cardiovascular: Normal rate, regular rhythm and normal heart sounds. Exam reveals no gallop and no friction rub.   No murmur heard.  Pulmonary/Chest: Effort normal and breath sounds normal. No stridor. No respiratory distress. She has no wheezes. She has no rales.   Musculoskeletal: She exhibits no edema.   Neurological: She is alert and oriented to person, place, and time.   Skin: She is not diaphoretic.   Psychiatric: She has a normal mood and affect.       Assessment:       1. Positive YINA (antinuclear antibody)    2. Bilateral hand pain        Plan:       Rosa Maria was seen today for discuss joint pains/ hands.    Diagnoses and all orders for this visit:    Positive YINA (antinuclear antibody)  -     Ambulatory referral to Rheumatology    Bilateral hand pain  -     methylPREDNISolone (MEDROL DOSEPACK) 4 mg tablet; use as directed  -     YINA; Future  -     Sedimentation rate; Future  -     C-reactive protein; Future  -     Cyclic citrul peptide antibody, IgG; Future    ORDERED LABS TO CHECK FOR " RHEUMATOID ARTHRITIS

## 2018-10-13 LAB — CCP AB SER IA-ACNC: 0.6 U/ML

## 2018-10-15 LAB — ANA SER QL IF: NORMAL

## 2018-11-05 ENCOUNTER — PATIENT MESSAGE (OUTPATIENT)
Dept: FAMILY MEDICINE | Facility: CLINIC | Age: 43
End: 2018-11-05

## 2018-11-06 RX ORDER — METHENAMINE, SODIUM PHOSPHATE, MONOBASIC, METHYLENE BLUE, AND HYOSCYAMINE SULFATE 81.6; 40.8; 10.8; .12 MG/1; MG/1; MG/1; MG/1
1 TABLET, COATED ORAL DAILY PRN
Qty: 30 TABLET | Refills: 11 | Status: SHIPPED | OUTPATIENT
Start: 2018-11-06 | End: 2020-04-09 | Stop reason: ALTCHOICE

## 2019-02-13 DIAGNOSIS — G25.81 RESTLESS LEG: ICD-10-CM

## 2019-02-13 RX ORDER — ROPINIROLE 0.5 MG/1
TABLET, FILM COATED ORAL
Qty: 60 TABLET | Refills: 11 | Status: SHIPPED | OUTPATIENT
Start: 2019-02-13 | End: 2020-02-12

## 2019-04-05 ENCOUNTER — OFFICE VISIT (OUTPATIENT)
Dept: FAMILY MEDICINE | Facility: CLINIC | Age: 44
End: 2019-04-05
Payer: COMMERCIAL

## 2019-04-05 VITALS
HEART RATE: 79 BPM | HEIGHT: 62 IN | BODY MASS INDEX: 21.25 KG/M2 | WEIGHT: 115.5 LBS | OXYGEN SATURATION: 98 % | SYSTOLIC BLOOD PRESSURE: 106 MMHG | TEMPERATURE: 99 F | DIASTOLIC BLOOD PRESSURE: 64 MMHG

## 2019-04-05 DIAGNOSIS — M77.12 LATERAL EPICONDYLITIS OF LEFT ELBOW: Primary | ICD-10-CM

## 2019-04-05 PROCEDURE — 20605 DRAIN/INJ JOINT/BURSA W/O US: CPT | Mod: S$GLB,,, | Performed by: FAMILY MEDICINE

## 2019-04-05 PROCEDURE — 3008F PR BODY MASS INDEX (BMI) DOCUMENTED: ICD-10-PCS | Mod: CPTII,S$GLB,, | Performed by: FAMILY MEDICINE

## 2019-04-05 PROCEDURE — 99214 OFFICE O/P EST MOD 30 MIN: CPT | Mod: 25,S$GLB,, | Performed by: FAMILY MEDICINE

## 2019-04-05 PROCEDURE — 99999 PR PBB SHADOW E&M-EST. PATIENT-LVL III: ICD-10-PCS | Mod: PBBFAC,,, | Performed by: FAMILY MEDICINE

## 2019-04-05 PROCEDURE — 20605 PR DRAIN/INJECT INTERMEDIATE JOINT/BURSA: ICD-10-PCS | Mod: S$GLB,,, | Performed by: FAMILY MEDICINE

## 2019-04-05 PROCEDURE — 3008F BODY MASS INDEX DOCD: CPT | Mod: CPTII,S$GLB,, | Performed by: FAMILY MEDICINE

## 2019-04-05 PROCEDURE — 99999 PR PBB SHADOW E&M-EST. PATIENT-LVL III: CPT | Mod: PBBFAC,,, | Performed by: FAMILY MEDICINE

## 2019-04-05 PROCEDURE — 99214 PR OFFICE/OUTPT VISIT, EST, LEVL IV, 30-39 MIN: ICD-10-PCS | Mod: 25,S$GLB,, | Performed by: FAMILY MEDICINE

## 2019-04-05 RX ORDER — METHYLPREDNISOLONE ACETATE 40 MG/ML
40 INJECTION, SUSPENSION INTRA-ARTICULAR; INTRALESIONAL; INTRAMUSCULAR; SOFT TISSUE
Status: COMPLETED | OUTPATIENT
Start: 2019-04-05 | End: 2019-04-05

## 2019-04-05 RX ADMIN — METHYLPREDNISOLONE ACETATE 40 MG: 40 INJECTION, SUSPENSION INTRA-ARTICULAR; INTRALESIONAL; INTRAMUSCULAR; SOFT TISSUE at 04:04

## 2019-04-05 NOTE — PROGRESS NOTES
Subjective:       Patient ID: Rosa Maria Kay is a 44 y.o. female.    Chief Complaint: Arm Pain (left)    44 year old female presents with a 3 weeks history of elbow pain. She takes advil daily to help her sleep and it helps her sleep. She denies any improvement. She has no redness or swelling.      Past Medical History:  No date: Interstitial cystitis      Comment:  Dr. Wheeler   History reviewed. No pertinent surgical history.  Review of patient's family history indicates:  Problem: Cancer      Relation: Mother          Age of Onset: 44          Comment: colon    Social History    Socioeconomic History      Marital status:       Spouse name: Not on file      Number of children: Not on file      Years of education: Not on file      Highest education level: Not on file    Occupational History      Not on file    Social Needs      Financial resource strain: Not hard at all      Food insecurity:        Worry: Never true        Inability: Never true      Transportation needs:        Medical: No        Non-medical: No    Tobacco Use      Smoking status: Never Smoker      Smokeless tobacco: Never Used    Substance and Sexual Activity      Alcohol use: No        Frequency: Monthly or less        Drinks per session: 1 or 2        Binge frequency: Never      Drug use: No      Sexual activity: Yes        Partners: Male    Lifestyle      Physical activity:        Days per week: 7 days        Minutes per session: 60 min      Stress: Very much    Relationships      Social connections:        Talks on phone: Never        Gets together: Patient refused        Attends Caodaism service: Not on file        Active member of club or organization: No        Attends meetings of clubs or organizations: Never        Relationship status:     Other Topics      Concerns:        Not on file    Social History Narrative      Not on file        Review of Systems   Constitutional: Negative for activity change and unexpected  "weight change.   HENT: Negative for hearing loss, rhinorrhea and trouble swallowing.    Eyes: Negative for discharge and visual disturbance.   Respiratory: Negative for chest tightness and wheezing.    Cardiovascular: Negative for chest pain and palpitations.   Gastrointestinal: Negative for blood in stool, constipation, diarrhea and vomiting.   Endocrine: Negative for polydipsia and polyuria.   Genitourinary: Positive for vaginal bleeding. Negative for difficulty urinating, dysuria, hematuria and menstrual problem.   Musculoskeletal: Positive for arthralgias. Negative for joint swelling and neck pain.   Neurological: Negative for weakness and headaches.   Psychiatric/Behavioral: Negative for confusion and dysphoric mood.       Objective:       Vitals:    04/05/19 1543   BP: 106/64   Pulse: 79   Temp: 98.5 °F (36.9 °C)   TempSrc: Oral   SpO2: 98%   Weight: 52.4 kg (115 lb 8.3 oz)   Height: 5' 1.5" (1.562 m)       Physical Exam   Constitutional: She appears well-developed and well-nourished. No distress.   HENT:   Head: Normocephalic and atraumatic.   Musculoskeletal:        Left elbow: She exhibits normal range of motion, no swelling, no effusion, no deformity and no laceration. Tenderness found. Lateral epicondyle tenderness noted. No medial epicondyle tenderness noted.   Skin: She is not diaphoretic.       Date:4/5/19  Time:4:11 p.m.  Name of the procedure being done: elbow injection  Indications: left lateral epicondylitis.  Patient consent:  ü Document that the indications, risks and alternatives to the procedure were explained to the patient. Note that the patient was given the opportunity to ask questions and that the patient consented to the procedure in writing  Pertinent Lab Values:  N/A  Type of Anesthesia used: i.e. 1% lidocaine with epinephrine.   Description of the procedure: Using sterile prep, local anesthesia, standard position needed for the procedure, device and technique being used. Depomedrol 40 " mg with a cc of 1% lidocaine with epinephrine was injected at the most tender spt at a 30 degree angle. She tolerated this without any issues.  Complications:none  Estimated blood loss ( if indicated)  Disposition: Pt tolerated the procedure well      Assessment:       1. Lateral epicondylitis of left elbow        Plan:       Rosa Maria was seen today for arm pain (left).    Diagnoses and all orders for this visit:    Lateral epicondylitis of left elbow  -     methylPREDNISolone acetate injection 40 mg    area was injected with lidocaine and depomedrol 40 mg

## 2019-07-31 ENCOUNTER — PATIENT OUTREACH (OUTPATIENT)
Dept: ADMINISTRATIVE | Facility: HOSPITAL | Age: 44
End: 2019-07-31

## 2019-09-06 LAB — PAP SMEAR: NORMAL

## 2019-12-05 DIAGNOSIS — Z12.39 BREAST CANCER SCREENING: ICD-10-CM

## 2019-12-16 ENCOUNTER — OFFICE VISIT (OUTPATIENT)
Dept: FAMILY MEDICINE | Facility: CLINIC | Age: 44
End: 2019-12-16
Payer: COMMERCIAL

## 2019-12-16 VITALS
SYSTOLIC BLOOD PRESSURE: 100 MMHG | BODY MASS INDEX: 20.16 KG/M2 | OXYGEN SATURATION: 97 % | HEART RATE: 74 BPM | WEIGHT: 109.56 LBS | TEMPERATURE: 98 F | HEIGHT: 62 IN | DIASTOLIC BLOOD PRESSURE: 64 MMHG

## 2019-12-16 DIAGNOSIS — F40.248 FEAR OF PUBLIC SPEAKING: Primary | ICD-10-CM

## 2019-12-16 PROCEDURE — 99213 OFFICE O/P EST LOW 20 MIN: CPT | Mod: S$GLB,,, | Performed by: FAMILY MEDICINE

## 2019-12-16 PROCEDURE — 99213 PR OFFICE/OUTPT VISIT, EST, LEVL III, 20-29 MIN: ICD-10-PCS | Mod: S$GLB,,, | Performed by: FAMILY MEDICINE

## 2019-12-16 PROCEDURE — 99999 PR PBB SHADOW E&M-EST. PATIENT-LVL III: ICD-10-PCS | Mod: PBBFAC,,, | Performed by: FAMILY MEDICINE

## 2019-12-16 PROCEDURE — 99999 PR PBB SHADOW E&M-EST. PATIENT-LVL III: CPT | Mod: PBBFAC,,, | Performed by: FAMILY MEDICINE

## 2019-12-16 NOTE — PROGRESS NOTES
Subjective:       Patient ID: Rosa Maria Kay is a 44 y.o. female.    Chief Complaint: Headache    44 year old female presents with concerns about an episode st school.  She states she had a stressful day and went to see the blood of lesions. She states in the middle of it she forgot with the words.  She denied any syncopal episodes.  She had headaches prior to this episode earlier that week, but none when this occurred.  She states she was stressed out and did take time to relax and was fine after that.    Past Medical History:   Diagnosis Date    Interstitial cystitis     Dr. Wheeler      History reviewed. No pertinent surgical history.  Family History   Problem Relation Age of Onset    Cancer Mother 44        colon     Social History     Socioeconomic History    Marital status:      Spouse name: Not on file    Number of children: Not on file    Years of education: Not on file    Highest education level: Not on file   Occupational History    Not on file   Social Needs    Financial resource strain: Not hard at all    Food insecurity:     Worry: Never true     Inability: Never true    Transportation needs:     Medical: No     Non-medical: No   Tobacco Use    Smoking status: Never Smoker    Smokeless tobacco: Never Used   Substance and Sexual Activity    Alcohol use: No     Frequency: Monthly or less     Drinks per session: 1 or 2     Binge frequency: Never    Drug use: No    Sexual activity: Yes     Partners: Male   Lifestyle    Physical activity:     Days per week: 7 days     Minutes per session: 60 min    Stress: Very much   Relationships    Social connections:     Talks on phone: Never     Gets together: Patient refused     Attends Latter day service: Not on file     Active member of club or organization: No     Attends meetings of clubs or organizations: Never     Relationship status:    Other Topics Concern    Not on file   Social History Narrative    Not on file  "      Review of Systems   Constitutional: Negative for activity change and unexpected weight change.   HENT: Negative for hearing loss, rhinorrhea and trouble swallowing.    Eyes: Negative for discharge and visual disturbance.   Respiratory: Negative for chest tightness and wheezing.    Cardiovascular: Negative for chest pain and palpitations.   Gastrointestinal: Negative for blood in stool, constipation, diarrhea and vomiting.   Endocrine: Negative for polydipsia and polyuria.   Genitourinary: Negative for difficulty urinating, dysuria, hematuria and menstrual problem.   Musculoskeletal: Negative for arthralgias, joint swelling and neck pain.   Neurological: Positive for headaches. Negative for weakness.   Psychiatric/Behavioral: Negative for confusion and dysphoric mood.       Objective:       Vitals:    12/16/19 1454   BP: 100/64   Pulse: 74   Temp: 98.1 °F (36.7 °C)   TempSrc: Oral   SpO2: 97%   Weight: 49.7 kg (109 lb 9.1 oz)   Height: 5' 1.5" (1.562 m)       Physical Exam   Constitutional: She is oriented to person, place, and time. She appears well-developed and well-nourished. No distress.   HENT:   Head: Normocephalic and atraumatic.   Neck: Normal range of motion. Neck supple.   Cardiovascular: Normal rate, regular rhythm and normal heart sounds. Exam reveals no gallop and no friction rub.   No murmur heard.  Pulmonary/Chest: Effort normal and breath sounds normal. No stridor. No respiratory distress. She has no wheezes. She has no rales.   Neurological: She is alert and oriented to person, place, and time. She displays normal reflexes. No cranial nerve deficit. She exhibits normal muscle tone. Coordination normal.   Skin: She is not diaphoretic.   Psychiatric: She has a normal mood and affect.       Assessment:       1. Fear of public speaking        Plan:       Rosa Maria was seen today for headache.    Diagnoses and all orders for this visit:    Fear of public speaking      Patient had an episode, which was " likely result of fear of public speaking.

## 2019-12-16 NOTE — PROGRESS NOTES
Patient has had her recent pap smear done with Dr. Cote in September 2019./ PATIENT DECLINED [facility] TO RETAIN A COPY OF HER PAP SMEAR REPORTS.  Patient is scheduled this month for her mammogram.

## 2019-12-23 ENCOUNTER — HOSPITAL ENCOUNTER (OUTPATIENT)
Dept: RADIOLOGY | Facility: HOSPITAL | Age: 44
Discharge: HOME | End: 2019-12-23
Attending: FAMILY MEDICINE
Payer: COMMERCIAL

## 2019-12-23 DIAGNOSIS — Z12.31 ENCOUNTER FOR SCREENING MAMMOGRAM FOR BREAST CANCER: ICD-10-CM

## 2019-12-23 DIAGNOSIS — Z12.39 BREAST CANCER SCREENING: ICD-10-CM

## 2019-12-23 PROCEDURE — 77063 BREAST TOMOSYNTHESIS BI: CPT | Mod: 26,,, | Performed by: RADIOLOGY

## 2019-12-23 PROCEDURE — 77067 SCR MAMMO BI INCL CAD: CPT | Mod: 26,,, | Performed by: RADIOLOGY

## 2019-12-23 PROCEDURE — 77067 MAMMO DIGITAL SCREENING BILAT WITH TOMOSYNTHESIS_CAD: ICD-10-PCS | Mod: 26,,, | Performed by: RADIOLOGY

## 2019-12-23 PROCEDURE — 77067 SCR MAMMO BI INCL CAD: CPT | Mod: TC

## 2019-12-23 PROCEDURE — 77063 MAMMO DIGITAL SCREENING BILAT WITH TOMOSYNTHESIS_CAD: ICD-10-PCS | Mod: 26,,, | Performed by: RADIOLOGY

## 2019-12-31 ENCOUNTER — PATIENT MESSAGE (OUTPATIENT)
Dept: FAMILY MEDICINE | Facility: CLINIC | Age: 44
End: 2019-12-31

## 2020-01-07 ENCOUNTER — OFFICE VISIT (OUTPATIENT)
Dept: FAMILY MEDICINE | Facility: CLINIC | Age: 45
End: 2020-01-07
Payer: COMMERCIAL

## 2020-01-07 ENCOUNTER — LAB VISIT (OUTPATIENT)
Dept: LAB | Facility: HOSPITAL | Age: 45
End: 2020-01-07
Payer: COMMERCIAL

## 2020-01-07 VITALS
WEIGHT: 109.81 LBS | HEART RATE: 72 BPM | TEMPERATURE: 98 F | BODY MASS INDEX: 20.73 KG/M2 | SYSTOLIC BLOOD PRESSURE: 98 MMHG | DIASTOLIC BLOOD PRESSURE: 68 MMHG | HEIGHT: 61 IN | OXYGEN SATURATION: 98 %

## 2020-01-07 DIAGNOSIS — Z12.4 PAP SMEAR FOR CERVICAL CANCER SCREENING: ICD-10-CM

## 2020-01-07 DIAGNOSIS — R25.3 MUSCLE TWITCH: ICD-10-CM

## 2020-01-07 DIAGNOSIS — R25.3 MUSCLE TWITCH: Primary | ICD-10-CM

## 2020-01-07 LAB
ALBUMIN SERPL BCP-MCNC: 4.8 G/DL (ref 3.5–5.2)
ALP SERPL-CCNC: 53 U/L (ref 55–135)
ALT SERPL W/O P-5'-P-CCNC: 15 U/L (ref 10–44)
ANION GAP SERPL CALC-SCNC: 7 MMOL/L (ref 8–16)
AST SERPL-CCNC: 17 U/L (ref 10–40)
BASOPHILS # BLD AUTO: 0.01 K/UL (ref 0–0.2)
BASOPHILS NFR BLD: 0.2 % (ref 0–1.9)
BILIRUB SERPL-MCNC: 0.2 MG/DL (ref 0.1–1)
BUN SERPL-MCNC: 9 MG/DL (ref 6–20)
CALCIUM SERPL-MCNC: 10.4 MG/DL (ref 8.7–10.5)
CHLORIDE SERPL-SCNC: 102 MMOL/L (ref 95–110)
CK SERPL-CCNC: 82 U/L (ref 20–180)
CO2 SERPL-SCNC: 30 MMOL/L (ref 23–29)
CREAT SERPL-MCNC: 0.9 MG/DL (ref 0.5–1.4)
CRP SERPL-MCNC: 1.4 MG/L (ref 0–8.2)
DIFFERENTIAL METHOD: ABNORMAL
EOSINOPHIL # BLD AUTO: 0 K/UL (ref 0–0.5)
EOSINOPHIL NFR BLD: 0.5 % (ref 0–8)
ERYTHROCYTE [DISTWIDTH] IN BLOOD BY AUTOMATED COUNT: 12.7 % (ref 11.5–14.5)
ERYTHROCYTE [SEDIMENTATION RATE] IN BLOOD BY WESTERGREN METHOD: 11 MM/HR (ref 0–20)
EST. GFR  (AFRICAN AMERICAN): >60 ML/MIN/1.73 M^2
EST. GFR  (NON AFRICAN AMERICAN): >60 ML/MIN/1.73 M^2
GLUCOSE SERPL-MCNC: 96 MG/DL (ref 70–110)
HCT VFR BLD AUTO: 35.9 % (ref 37–48.5)
HGB BLD-MCNC: 11.7 G/DL (ref 12–16)
IMM GRANULOCYTES # BLD AUTO: 0.01 K/UL (ref 0–0.04)
IMM GRANULOCYTES NFR BLD AUTO: 0.2 % (ref 0–0.5)
LYMPHOCYTES # BLD AUTO: 1.8 K/UL (ref 1–4.8)
LYMPHOCYTES NFR BLD: 41.5 % (ref 18–48)
MAGNESIUM SERPL-MCNC: 2 MG/DL (ref 1.6–2.6)
MCH RBC QN AUTO: 30.8 PG (ref 27–31)
MCHC RBC AUTO-ENTMCNC: 32.6 G/DL (ref 32–36)
MCV RBC AUTO: 95 FL (ref 82–98)
MONOCYTES # BLD AUTO: 0.3 K/UL (ref 0.3–1)
MONOCYTES NFR BLD: 7.6 % (ref 4–15)
NEUTROPHILS # BLD AUTO: 2.2 K/UL (ref 1.8–7.7)
NEUTROPHILS NFR BLD: 50 % (ref 38–73)
NRBC BLD-RTO: 0 /100 WBC
PLATELET # BLD AUTO: 267 K/UL (ref 150–350)
PMV BLD AUTO: 10 FL (ref 9.2–12.9)
POTASSIUM SERPL-SCNC: 4.3 MMOL/L (ref 3.5–5.1)
PROT SERPL-MCNC: 7.8 G/DL (ref 6–8.4)
RBC # BLD AUTO: 3.8 M/UL (ref 4–5.4)
SODIUM SERPL-SCNC: 139 MMOL/L (ref 136–145)
WBC # BLD AUTO: 4.34 K/UL (ref 3.9–12.7)

## 2020-01-07 PROCEDURE — 99999 PR PBB SHADOW E&M-EST. PATIENT-LVL III: ICD-10-PCS | Mod: PBBFAC,,, | Performed by: INTERNAL MEDICINE

## 2020-01-07 PROCEDURE — 3008F PR BODY MASS INDEX (BMI) DOCUMENTED: ICD-10-PCS | Mod: CPTII,S$GLB,, | Performed by: INTERNAL MEDICINE

## 2020-01-07 PROCEDURE — 82550 ASSAY OF CK (CPK): CPT

## 2020-01-07 PROCEDURE — 99214 OFFICE O/P EST MOD 30 MIN: CPT | Mod: S$GLB,,, | Performed by: INTERNAL MEDICINE

## 2020-01-07 PROCEDURE — 80053 COMPREHEN METABOLIC PANEL: CPT

## 2020-01-07 PROCEDURE — 3008F BODY MASS INDEX DOCD: CPT | Mod: CPTII,S$GLB,, | Performed by: INTERNAL MEDICINE

## 2020-01-07 PROCEDURE — 83735 ASSAY OF MAGNESIUM: CPT

## 2020-01-07 PROCEDURE — 86140 C-REACTIVE PROTEIN: CPT

## 2020-01-07 PROCEDURE — 85025 COMPLETE CBC W/AUTO DIFF WBC: CPT

## 2020-01-07 PROCEDURE — 36415 COLL VENOUS BLD VENIPUNCTURE: CPT | Mod: PO

## 2020-01-07 PROCEDURE — 99214 PR OFFICE/OUTPT VISIT, EST, LEVL IV, 30-39 MIN: ICD-10-PCS | Mod: S$GLB,,, | Performed by: INTERNAL MEDICINE

## 2020-01-07 PROCEDURE — 82306 VITAMIN D 25 HYDROXY: CPT

## 2020-01-07 PROCEDURE — 99999 PR PBB SHADOW E&M-EST. PATIENT-LVL III: CPT | Mod: PBBFAC,,, | Performed by: INTERNAL MEDICINE

## 2020-01-07 PROCEDURE — 85652 RBC SED RATE AUTOMATED: CPT

## 2020-01-07 RX ORDER — TIZANIDINE 4 MG/1
4 TABLET ORAL NIGHTLY PRN
Qty: 30 TABLET | Refills: 0 | Status: SHIPPED | OUTPATIENT
Start: 2020-01-07 | End: 2020-01-17

## 2020-01-07 NOTE — PROGRESS NOTES
SUBJECTIVE     Chief Complaint   Patient presents with    restless leg     left        HPI  Rosa Maria Kay is a 44 y.o. female with multiple medical diagnoses as listed in the medical history and problem list that presents for evaluation of L medial knee pain since last Wednesday. Pt reports a intermittent twitching of the L medial lower thigh muscle. Pt reports a throbbing discomfort at a 1/10 and pain is worse at night. Denies any alleviating/aggravating factors. Pt has been applying pressure, Asper Creme, ice/heat compresses, and taking Requip without relief of pain. Denies any preceding trauma, falls, or missteps. Pt denies any recent medication changes, stress, etc.     PAST MEDICAL HISTORY:  Past Medical History:   Diagnosis Date    Interstitial cystitis     Dr. Wheeler       PAST SURGICAL HISTORY:  History reviewed. No pertinent surgical history.    SOCIAL HISTORY:  Social History     Socioeconomic History    Marital status:      Spouse name: Not on file    Number of children: Not on file    Years of education: Not on file    Highest education level: Not on file   Occupational History    Not on file   Social Needs    Financial resource strain: Not hard at all    Food insecurity:     Worry: Never true     Inability: Never true    Transportation needs:     Medical: No     Non-medical: No   Tobacco Use    Smoking status: Never Smoker    Smokeless tobacco: Never Used   Substance and Sexual Activity    Alcohol use: No     Frequency: Monthly or less     Drinks per session: 1 or 2     Binge frequency: Never    Drug use: No    Sexual activity: Yes     Partners: Male   Lifestyle    Physical activity:     Days per week: 7 days     Minutes per session: 60 min    Stress: Very much   Relationships    Social connections:     Talks on phone: Never     Gets together: Patient refused     Attends Lutheran service: Not on file     Active member of club or organization: No     Attends meetings of  "clubs or organizations: Never     Relationship status:    Other Topics Concern    Not on file   Social History Narrative    Not on file       FAMILY HISTORY:  Family History   Problem Relation Age of Onset    Cancer Mother 44        colon       ALLERGIES AND MEDICATIONS: updated and reviewed.  Review of patient's allergies indicates:  No Known Allergies  Current Outpatient Medications   Medication Sig Dispense Refill    flavoxate (URISPAS) 100 mg Tab Take 100 mg by mouth once daily.  12    pentosan polysulfate (ELMIRON) 100 mg Cap Take 100 mg by mouth 3 (three) times daily.      rOPINIRole (REQUIP) 0.5 MG tablet TAKE ONE TABLET BY MOUTH TWICE DAILY AS NEEDED 60 tablet 11    UROGESIC-BLUE 81.6-40.8-0.12 mg Tab Take 1 tablet by mouth daily as needed. 30 tablet 11    tiZANidine (ZANAFLEX) 4 MG tablet Take 1 tablet (4 mg total) by mouth nightly as needed (MAY CAUSE DROWSINESS). 30 tablet 0     No current facility-administered medications for this visit.        ROS  Review of Systems   Constitutional: Negative for chills and fever.   HENT: Negative for hearing loss and sore throat.    Eyes: Negative for visual disturbance.   Respiratory: Negative for cough and shortness of breath.    Cardiovascular: Negative for chest pain, palpitations and leg swelling.   Gastrointestinal: Negative for abdominal pain, constipation, diarrhea, nausea and vomiting.   Genitourinary: Negative for dysuria, frequency and urgency.   Musculoskeletal: Positive for myalgias (L medial lower thigh). Negative for arthralgias and joint swelling.   Skin: Negative for rash and wound.   Neurological: Negative for headaches.   Psychiatric/Behavioral: Negative for agitation and confusion. The patient is not nervous/anxious.          OBJECTIVE     Physical Exam  Vitals:    01/07/20 1517   BP: 98/68   Pulse: 72   Temp: 97.9 °F (36.6 °C)    Body mass index is 20.74 kg/m².  Weight: 49.8 kg (109 lb 12.6 oz)   Height: 5' 1" (154.9 cm)     Physical " Exam   Constitutional: She is oriented to person, place, and time. She appears well-developed and well-nourished. No distress.   HENT:   Head: Normocephalic and atraumatic.   Right Ear: External ear normal.   Left Ear: External ear normal.   Nose: Nose normal.   Mouth/Throat: Oropharynx is clear and moist.   Eyes: Conjunctivae and EOM are normal. Right eye exhibits no discharge. Left eye exhibits no discharge. No scleral icterus.   Neck: Normal range of motion. Neck supple. No JVD present. No tracheal deviation present.   Cardiovascular: Normal rate, regular rhythm and intact distal pulses. Exam reveals no gallop and no friction rub.   No murmur heard.  Pulmonary/Chest: Effort normal and breath sounds normal. No respiratory distress. She has no wheezes.   Abdominal: Soft. Bowel sounds are normal. She exhibits no distension and no mass. There is no tenderness. There is no rebound and no guarding.   Musculoskeletal: Normal range of motion. She exhibits no edema, tenderness or deformity.   Neurological: She is alert and oriented to person, place, and time. She exhibits normal muscle tone. Coordination normal.   CN III, IV, VI- EOMI, CN VII intact with symmetric smile    Muscle strength 5/5 in 4/4 ext    Sensation intact throughout to light touch    Gait Normal     Skin: Skin is warm and dry. No rash noted. No erythema.   Psychiatric: She has a normal mood and affect. Her behavior is normal. Judgment and thought content normal.         Health Maintenance       Date Due Completion Date    TETANUS VACCINE 01/30/1995 ---    Pap Smear with HPV Cotest 06/20/2019 6/20/2016    Influenza Vaccine (1) 09/01/2019 ---    Mammogram 12/23/2020 12/23/2019            ASSESSMENT     44 y.o. female with     1. Muscle twitch    2. Pap smear for cervical cancer screening        PLAN:     1. Muscle twitch  - Unknown etiology, so will proceed with labs and start trial course of Tizanidine as below  - Pt encouraged to apply ice packs 2-3  times daily at 10 minute intervals x 72 hours, then okay to change to heating compress with care not to burn her self; she  voiced understanding   - CK; Future  - C-reactive protein; Future  - Sedimentation rate; Future  - Magnesium; Future  - Comprehensive metabolic panel; Future  - CBC auto differential; Future  - Vitamin D; Future  - tiZANidine (ZANAFLEX) 4 MG tablet; Take 1 tablet (4 mg total) by mouth nightly as needed (MAY CAUSE DROWSINESS).  Dispense: 30 tablet; Refill: 0    2. Pap smear for cervical cancer screening  - Requested pt complete JUAN today to have records sent per Ob/Gyn, but she adamantly refused despite educating pt on need for information; discussed with pt's PCP, who is okay with the lack of health information        RTC in 2 weeks for repeat assessment of current treatment plan       Allie Mckeon MD  01/07/2020 3:25 PM        No follow-ups on file.

## 2020-01-07 NOTE — PROGRESS NOTES
Patient decline flu vaccine today.  Informed patient tetanus vaccine overdue.  Patient states had pap smear with Dr Cote in 09/2019.

## 2020-01-08 LAB — 25(OH)D3+25(OH)D2 SERPL-MCNC: 48 NG/ML (ref 30–96)

## 2020-01-09 ENCOUNTER — PATIENT MESSAGE (OUTPATIENT)
Dept: FAMILY MEDICINE | Facility: CLINIC | Age: 45
End: 2020-01-09

## 2020-01-14 ENCOUNTER — OFFICE VISIT (OUTPATIENT)
Dept: FAMILY MEDICINE | Facility: CLINIC | Age: 45
End: 2020-01-14
Payer: COMMERCIAL

## 2020-01-14 VITALS
OXYGEN SATURATION: 98 % | HEIGHT: 61 IN | HEART RATE: 73 BPM | SYSTOLIC BLOOD PRESSURE: 100 MMHG | DIASTOLIC BLOOD PRESSURE: 70 MMHG | TEMPERATURE: 98 F | BODY MASS INDEX: 21.27 KG/M2 | WEIGHT: 112.63 LBS

## 2020-01-14 DIAGNOSIS — Z12.11 COLON CANCER SCREENING: ICD-10-CM

## 2020-01-14 DIAGNOSIS — R25.3 MUSCLE FASCICULATION: Primary | ICD-10-CM

## 2020-01-14 DIAGNOSIS — Z80.0 FAMILY HISTORY OF COLON CANCER IN MOTHER: ICD-10-CM

## 2020-01-14 PROCEDURE — 99214 PR OFFICE/OUTPT VISIT, EST, LEVL IV, 30-39 MIN: ICD-10-PCS | Mod: S$GLB,,, | Performed by: FAMILY MEDICINE

## 2020-01-14 PROCEDURE — 99999 PR PBB SHADOW E&M-EST. PATIENT-LVL III: CPT | Mod: PBBFAC,,, | Performed by: FAMILY MEDICINE

## 2020-01-14 PROCEDURE — 99214 OFFICE O/P EST MOD 30 MIN: CPT | Mod: S$GLB,,, | Performed by: FAMILY MEDICINE

## 2020-01-14 PROCEDURE — 3008F PR BODY MASS INDEX (BMI) DOCUMENTED: ICD-10-PCS | Mod: CPTII,S$GLB,, | Performed by: FAMILY MEDICINE

## 2020-01-14 PROCEDURE — 99999 PR PBB SHADOW E&M-EST. PATIENT-LVL III: ICD-10-PCS | Mod: PBBFAC,,, | Performed by: FAMILY MEDICINE

## 2020-01-14 PROCEDURE — 3008F BODY MASS INDEX DOCD: CPT | Mod: CPTII,S$GLB,, | Performed by: FAMILY MEDICINE

## 2020-01-14 RX ORDER — GABAPENTIN 100 MG/1
100 CAPSULE ORAL 2 TIMES DAILY
Qty: 60 CAPSULE | Refills: 0 | Status: SHIPPED | OUTPATIENT
Start: 2020-01-14 | End: 2020-11-23 | Stop reason: SDUPTHER

## 2020-01-14 NOTE — PROGRESS NOTES
Subjective:       Patient ID: Rosa Maria Kay is a 44 y.o. female.    Chief Complaint: Left Leg (Twitching)    44 year old presents with nerve twitching in her left leg. She states it woke up her up from sleep at one time. She is worried about this. She states she had 1 cup of coffee yesterday in the morning and it happened at 1230. She states it started new year's day. Se has no muscle weakness or pain. She runs 5 miles per day.     She is due for her 5 year colonoscopy. She states her mom  of colon cancer at 44. She gets it every 5 years. She is due for her repeat colonoscopy in 2020. She was a patient of Dr. Beard who has retired. She has not had polyps in either colonoscopy. Her brother who is 2 years older than her had polyps on his at 41 or 43 years of age. Her paternal aunt also had colon cancer at 69.       Past Medical History:  No date: Interstitial cystitis      Comment:  Dr. Wheeler   History reviewed. No pertinent surgical history.  Review of patient's family history indicates:  Problem: Cancer      Relation: Mother          Age of Onset: 44          Comment: colon    Social History    Socioeconomic History      Marital status:       Spouse name: Not on file      Number of children: Not on file      Years of education: Not on file      Highest education level: Not on file    Occupational History      Not on file    Social Needs      Financial resource strain: Not hard at all      Food insecurity:        Worry: Never true        Inability: Never true      Transportation needs:        Medical: No        Non-medical: No    Tobacco Use      Smoking status: Never Smoker      Smokeless tobacco: Never Used    Substance and Sexual Activity      Alcohol use: No        Frequency: Monthly or less        Drinks per session: 1 or 2        Binge frequency: Never      Drug use: No      Sexual activity: Yes        Partners: Male    Lifestyle      Physical activity:        Days per week: 7  "days        Minutes per session: 60 min      Stress: Very much    Relationships      Social connections:        Talks on phone: Never        Gets together: Patient refused        Attends Shinto service: Not on file        Active member of club or organization: No        Attends meetings of clubs or organizations: Never        Relationship status:     Other Topics      Concerns:        Not on file    Social History Narrative      Not on file        Review of Systems   Constitutional: Negative for activity change and unexpected weight change.   HENT: Negative for hearing loss, rhinorrhea and trouble swallowing.    Eyes: Negative for discharge and visual disturbance.   Respiratory: Negative for chest tightness and wheezing.    Cardiovascular: Negative for chest pain and palpitations.   Gastrointestinal: Negative for blood in stool, constipation, diarrhea and vomiting.   Endocrine: Negative for polydipsia and polyuria.   Genitourinary: Negative for difficulty urinating, dysuria, hematuria and menstrual problem.   Musculoskeletal: Negative for arthralgias, joint swelling and neck pain.   Neurological: Negative for weakness and headaches.   Psychiatric/Behavioral: Negative for confusion and dysphoric mood.       Objective:    \  Vitals:    01/14/20 1525   BP: 100/70   Pulse: 73   Temp: 98.2 °F (36.8 °C)   TempSrc: Oral   SpO2: 98%   Weight: 51.1 kg (112 lb 10.5 oz)   Height: 5' 1" (1.549 m)       Physical Exam   Constitutional: She is oriented to person, place, and time. She appears well-developed and well-nourished. No distress.   HENT:   Head: Normocephalic and atraumatic.   Neck: Normal range of motion. Neck supple.   Cardiovascular: Normal rate, regular rhythm and normal heart sounds. Exam reveals no gallop and no friction rub.   No murmur heard.  Pulmonary/Chest: Effort normal and breath sounds normal. No stridor. No respiratory distress. She has no wheezes. She has no rales.   Neurological: She is alert " and oriented to person, place, and time.   Muscle twitching of the left thigh   Skin: She is not diaphoretic.   Psychiatric: She has a normal mood and affect.       Assessment:       1. Muscle fasciculation    2. Family history of colon cancer in mother    3. Colon cancer screening        Plan:       Rosa Maria was seen today for left leg (twitching).    Diagnoses and all orders for this visit:    Muscle fasciculation  -     gabapentin (NEURONTIN) 100 MG capsule; Take 1 capsule (100 mg total) by mouth 2 (two) times daily.   trial of gabapentin for fasciculation and titrate up to 300 mg if necessary. Nerve conduction study if no improvement.     Family history of colon cancer in mother  -     Case request GI: COLONOSCOPY    Colon cancer screening  -     Case request GI: COLONOSCOPY

## 2020-02-11 DIAGNOSIS — G25.81 RESTLESS LEG: ICD-10-CM

## 2020-02-12 RX ORDER — ROPINIROLE 0.5 MG/1
TABLET, FILM COATED ORAL
Qty: 60 TABLET | Refills: 11 | Status: SHIPPED | OUTPATIENT
Start: 2020-02-12 | End: 2020-11-23 | Stop reason: SDUPTHER

## 2020-02-28 ENCOUNTER — PATIENT OUTREACH (OUTPATIENT)
Dept: ADMINISTRATIVE | Facility: HOSPITAL | Age: 45
End: 2020-02-28

## 2020-04-09 ENCOUNTER — OFFICE VISIT (OUTPATIENT)
Dept: UROGYNECOLOGY | Facility: CLINIC | Age: 45
End: 2020-04-09
Payer: COMMERCIAL

## 2020-04-09 ENCOUNTER — PATIENT OUTREACH (OUTPATIENT)
Dept: ADMINISTRATIVE | Facility: OTHER | Age: 45
End: 2020-04-09

## 2020-04-09 DIAGNOSIS — Z92.29 HISTORY OF REGULAR MEDICATION USE: ICD-10-CM

## 2020-04-09 DIAGNOSIS — N30.10 IC (INTERSTITIAL CYSTITIS): ICD-10-CM

## 2020-04-09 DIAGNOSIS — R39.15 URINARY URGENCY: Primary | ICD-10-CM

## 2020-04-09 PROCEDURE — 99244 PR OFFICE CONSULTATION,LEVEL IV: ICD-10-PCS | Mod: 95,,, | Performed by: OBSTETRICS & GYNECOLOGY

## 2020-04-09 PROCEDURE — 99244 OFF/OP CNSLTJ NEW/EST MOD 40: CPT | Mod: 95,,, | Performed by: OBSTETRICS & GYNECOLOGY

## 2020-04-09 RX ORDER — HYDROXYZINE HYDROCHLORIDE 10 MG/1
10 TABLET, FILM COATED ORAL NIGHTLY
Qty: 30 TABLET | Refills: 11 | Status: SHIPPED | OUTPATIENT
Start: 2020-04-09 | End: 2021-04-07 | Stop reason: SDUPTHER

## 2020-04-09 RX ORDER — URINARY ANTISEPTIC ANTISPASMODIC 81.6; 40.8; 10.8; .12 MG/1; MG/1; MG/1; MG/1
1 TABLET ORAL EVERY 6 HOURS
Qty: 120 TABLET | Refills: 1 | Status: SHIPPED | OUTPATIENT
Start: 2020-04-09 | End: 2020-10-07 | Stop reason: SDUPTHER

## 2020-04-09 NOTE — PATIENT INSTRUCTIONS
IC  --Start Prelief to help alkinize the urine: available on amazon   --Prelief Tablets : Each tablet contains 345 mg calcium glycerophosphate (65 mg of elemental calcium). The tablets also contain 0.25% magnesium  stearate as a processing aid. Two tablets are equivalent to 690 mg calcium glycerophosphate (130mg of elemental calcium).   --Prelief Powder : Each ¼ teaspoon usage of powder is comparable to two tablets. The powder dissolves rapidly in food or non-alcoholic beverages.  Tablets are recommended for taking with alcoholic beverages   --Usage:     --Tablets: 2-3 tablets, 2 times a day.    --Powder: ¼ teaspoon of powder 2 times a day. More can be used when needed  -Start the IC diet:  https://www.ichelp.org/wp-content/uploads/2015/07/food-list.pdf   -- Strt Hydroxyzine 10 mg at night for relief of bladder pain  --Continue Jackson but increase dosing  to every 6 hours  for urinary discomfort   --Elmiron three times per day  --OK to continue flavoxate for now if no improvement by next week will start an anticholinergic   --Bladder instillations discussed    --Hydrodistention With Cystoscopy: allows physicians to take a much closer look at the bladder wall. While the AUA no longer suggests that it be used as a diagnostic method (unless a diagnosis is in doubt), hydrodistention may provide modest relief in IC symptoms which can last from three to six months. It can, however, be a more difficult, painful procedure. As a result, it requires careful consideration and discussion.   -- keep a bladder dairy with pain diary as well   --urine culture

## 2020-04-09 NOTE — PROGRESS NOTES
Subjective:       Patient ID: Rosa Maria Kay is a 45 y.o. female.    Chief Complaint:  Urinary Frequency    The patient location is:  [place]  The chief complaint leading to consultation is:  Urinary urgency   Visit type: Virtual visit with synchronous audio and video  Total time spent with patient:  50 minutes   Each patient to whom he or she provides medical services by telemedicine is:  (1) informed of the relationship between the physician and patient and the respective role of any other health care provider with respect to management of the patient; and (2) notified that he or she may decline to receive medical services by telemedicine and may withdraw from such care at any time.    Notes: see below     History of Present Illness  HPI 45 Y O F P2   has a past medical history of Interstitial cystitis. initially Diagnosed in 2007 by  Dr. Pena who the patient continued with until she retired.  Has seen Dr. Flood since 2016 with management of medications  which has worked: Elmiron three times a day,  flovoxate once a day, uribel daily. She also takes opinorol for restless leg syndrome.     Tried : Elavil not reactions severe GI  Vaginal suppositories 2012 unclear of exact compounding      Has not tried:   Hydroxyzine  Prelief  Gabapentin  Bladder instillation   hydrodistention     Currently with     Symptoms are mostly when she lays down when her body is relax, no burning with urination, feels a sense of relief after she voids.     [facility]     Question 4/9/2020  9:39 AM CDT - Filed by Patient on 4/9/2020   General Urogynecology: Are you experiencing the following?    Dysuria (painful urination) No   Nocturia:  waking up at night to empty your bladder  Yes   If you answered yes to the previous question, how many times does this happen per night? 3-4   Enuresis (urine loss during sleep) No   Dribbling urine after you urinate No   Hematuria (urine appears red) No   Type of stream Weak   Urinary  "frequency: How often a day are you going to the bathroom per day?  More than 15   Urinary Tract Infections: How many Urinary Tract Infections have you had in the past year? I have not had a UTI in the past year   If you have had a UTI in the past year, what treatments have you had so far?  I have not had a UTI in the past year   Urinary Incontinence (General): Are you experiencing the following?    Past consultation for incontinence: Have you ever seen someone for the evaluation of incontinence? No   If you answered yes to the previous question, please select all the therapies you have tried.  None of the above   Please note the effectiveness of the therapies. Ineffective   Need to wear protection to keep clothes dry  No   If you answered yes to the previous question, please giselle the protection you use.  None   If you wear protection, how much wetness is typically on each pad? N/a- I do not wear protection   If you wear protection, how often do you have to change per day, if applicable?  0   Stress Symptoms: Are you experiencing the following?    Leakage of urine with cough, laugh and/or sneeze No   If you answered yes to the previous question, what is the frequency in days, weeks and/or months? Never   Leakage of urine with sex No   Leakage of urine with bending/ lifting No   Leakage of urine with briskly walking or jogging No   If you lose urine for any other reason not previously mentioned, please note it below, if applicable.     Urge Symptoms: Are you experiencing the following?    Urgency ("got to go" feeling) Yes   Urge: How frequently do you feel an urge to urinate (feeling like you "gotta go" to the bathroom and can't wait) Never   Do you experience a leakage of urine when you have a feeling of urgency?  No   Leakage of urine when unaware No   Past use of anticholinergics (medications used to treat overactive bladder) No   If you answered yes to the previous question, please giselle the anticholinergics you " have used:     Have you ever used Mirbetriq (aka Mirabegron)?  No   Prolapse Symptoms: Are you experiencing any of the following?     Falling out/ Bulging/ Heaviness in the vagina No   Vaginal/ Abdominal Pain/ Pressure No   Need to strain/ Push to void No   Need to wait on the toilet before you void No   Unusual position to urinate (using your hands to push back the vaginal bulge) No   Sensation of incomplete emptying No   Past use of pessary device No   If you answered yes to the previous question, please list the devices you have used below.     Bowel Symptoms: Are you experiencing any of the following?    Constipation No   Diarrhea  No   Hematochezia (bloody stool) No   Incomplete evacuation of stool No   Involuntary loss of formed stool No   Fecal smearing/urgency No   Involuntary loss of gas No   Vaginal Symptoms: Are you experiencing any of the following?     Abnormal vaginal bleeding  No   Vaginal dryness No   Sexually active  Yes   Dyspareunia (painful intercourse) No   Estrogen use           GYN & OB History  No LMP recorded. Patient is premenopausal.   Date of Last Pap: No result found    OB History    Para Term  AB Living   2 2 2         SAB TAB Ectopic Multiple Live Births                  # Outcome Date GA Lbr Syed/2nd Weight Sex Delivery Anes PTL Lv   2 Term            1 Term              OB  Largest: c/s ;   Forceps: no  Episiotomy:      GYN  Menarche: 14  Menstrual cycle: 30/   Menopause: no  Hysterectomy: no  Ovaries: prersent    Past Medical History:   Diagnosis Date    Interstitial cystitis     Dr. Wheeler       Past Surgical History:   Procedure Laterality Date    COLONOSCOPY      2 PREVIOUS ONES -NORMAL. DUE FOR REPEAT IN 2020       Current Outpatient Medications:     flavoxate (URISPAS) 100 mg Tab, Take 100 mg by mouth once daily., Disp: , Rfl: 12    gabapentin (NEURONTIN) 100 MG capsule, Take 1 capsule (100 mg total) by mouth 2 (two) times daily., Disp: 60 capsule,  Rfl: 0    hydroxyzine HCL (ATARAX) 10 MG Tab, Take 1 tablet (10 mg total) by mouth every evening., Disp: 30 tablet, Rfl: 11    methen-sod phos-meth blue-hyos (UROGESIC-BLUE) 81.6-40.8-0.12 mg Tab, Take 1 tablet by mouth every 6 (six) hours., Disp: 120 tablet, Rfl: 1    pentosan polysulfate (ELMIRON) 100 mg Cap, Take 100 mg by mouth 3 (three) times daily., Disp: , Rfl:     rOPINIRole (REQUIP) 0.5 MG tablet, TAKE ONE TABLET BY MOUTH TWICE DAILY AS NEEDED, Disp: 60 tablet, Rfl: 11      Review of Systems  Review of Systems   Constitutional: Negative.  Negative for activity change, appetite change, chills, diaphoresis, fatigue, fever and unexpected weight change.   HENT: Negative.    Eyes: Negative.    Respiratory: Negative.  Negative for cough.    Cardiovascular: Negative.    Gastrointestinal: Negative for abdominal distention, abdominal pain, anal bleeding, blood in stool, constipation, diarrhea, nausea, rectal pain and vomiting.   Endocrine: Negative.    Genitourinary: Positive for dysuria and urgency. Negative for decreased urine volume, difficulty urinating, dyspareunia, enuresis, flank pain, frequency, genital sores, hematuria, menstrual problem, pelvic pain, vaginal bleeding, vaginal discharge and vaginal pain.        Night time urination    Musculoskeletal: Negative for back pain.   Skin: Negative for color change, pallor, rash and wound.   Allergic/Immunologic: Negative for environmental allergies, food allergies and immunocompromised state.   Hematological: Negative for adenopathy. Does not bruise/bleed easily.   Psychiatric/Behavioral: Negative for agitation, behavioral problems, confusion and sleep disturbance.           Objective:     Physical Exam   Constitutional: She is oriented to person, place, and time. She appears well-developed and well-nourished.   HENT:   Head: Normocephalic and atraumatic.   Neck: Normal range of motion.   Pulmonary/Chest: No respiratory distress. She has no wheezes.    Musculoskeletal: Normal range of motion.   Neurological: She is alert and oriented to person, place, and time.   Psychiatric: She has a normal mood and affect. Her behavior is normal. Judgment and thought content normal.          Assessment:        1. Urinary urgency    2. IC (interstitial cystitis)    3. History of regular medication use               Plan:         IC  --Start Prelief to help alkinize the urine:   --Prelief Tablets : Each tablet contains 345 mg calcium glycerophosphate (65 mg of elemental calcium). The tablets also contain 0.25% magnesium  stearate as a processing aid. Two tablets are equivalent to 690 mg calcium glycerophosphate (130mg of elemental calcium).   --Prelief Powder : Each ¼ teaspoon usage of powder is comparable to two tablets. The powder dissolves rapidly in food or non-alcoholic beverages.  Tablets are recommended for taking with alcoholic beverages   --Usage:     --Tablets: 2-3 tablets, 2 times a day.    --Powder: ¼ teaspoon of powder 2 times a day. More can be used when needed  --Start the IC diet:  https://www.ichelp.org/wp-content/uploads/2015/07/food-list.pdf   -- Strt Hydroxyzine 10 mg at night for relief of bladder pain  --Continue Jackson but increase dosing  to every 6 hours  for urinary discomfort   --Elmiron three times per day  --OK to continue flavoxate for now if no improvement by next week will start an anticholinergic   --Bladder instillations discussed    --Hydrodistention With Cystoscopy: allows physicians to take a much closer look at the bladder wall. While the AUA no longer suggests that it be used as a diagnostic method (unless a diagnosis is in doubt), hydrodistention may provide modest relief in IC symptoms which can last from three to six months. It can, however, be a more difficult, painful procedure. As a result, it requires careful consideration and discussion.   -- keep a bladder dairy with pain diary as well   --urine culture     Approximately 45  min  were spent in consult, 100 % in discussion.     Thank you for requesting consultation of your patient.  I look forward to participating in her care.    Yeny Silva DO  Female Pelvic Medicine and Reconstructive Surgery  [facility] [place], [place]

## 2020-04-21 ENCOUNTER — PATIENT OUTREACH (OUTPATIENT)
Dept: ADMINISTRATIVE | Facility: OTHER | Age: 45
End: 2020-04-21

## 2020-04-21 ENCOUNTER — OFFICE VISIT (OUTPATIENT)
Dept: UROGYNECOLOGY | Facility: CLINIC | Age: 45
End: 2020-04-21
Payer: COMMERCIAL

## 2020-04-21 DIAGNOSIS — N30.10 IC (INTERSTITIAL CYSTITIS): Primary | ICD-10-CM

## 2020-04-21 DIAGNOSIS — R39.15 URINARY URGENCY: ICD-10-CM

## 2020-04-21 PROCEDURE — 99213 PR OFFICE/OUTPT VISIT, EST, LEVL III, 20-29 MIN: ICD-10-PCS | Mod: 95,,, | Performed by: OBSTETRICS & GYNECOLOGY

## 2020-04-21 PROCEDURE — 99213 OFFICE O/P EST LOW 20 MIN: CPT | Mod: 95,,, | Performed by: OBSTETRICS & GYNECOLOGY

## 2020-04-21 RX ORDER — SOLIFENACIN SUCCINATE 5 MG/1
5 TABLET, FILM COATED ORAL DAILY
Qty: 90 TABLET | Refills: 3 | Status: SHIPPED | OUTPATIENT
Start: 2020-04-21 | End: 2021-06-06

## 2020-04-21 NOTE — PROGRESS NOTES
Subjective:       Patient ID: Rosa Maria Kay is a 45 y.o. female.    Chief Complaint:  Urinary Urgency and Pelvic Pain    The patient location is:  [place]  The chief complaint leading to consultation is:  Urinary urgency   Visit type: Virtual visit with synchronous audio and video  Total time spent with patient:  20+ minutes   Each patient to whom he or she provides medical services by telemedicine is:  (1) informed of the relationship between the physician and patient and the respective role of any other health care provider with respect to management of the patient; and (2) notified that he or she may decline to receive medical services by telemedicine and may withdraw from such care at any time.    Notes: see below     History of Present Illness  HPI 45 Y O F P2   has a past medical history of Interstitial cystitis. initially Diagnosed in 2007 by  Dr. Pena who the patient continued with until she retired.  Has seen Dr. Flood since 2016 with management of medications  which has worked: Elmiron three times a day,  flovoxate once a day, uribel daily. She also takes opinorol for restless leg syndrome.     Tried : Elavil not helpful reactions severe GI  Vaginal suppositories 2012 unclear of exact compounding      Has not tried:   Hydroxyzine  Prelief  Gabapentin  Bladder instillation   hydrodistention     Symptoms are mostly when she lays down when her body is relax, no burning with urination, feels a sense of relief after she voids.     Interval  HP since the last visit: last seen one week ago.   Feeling much better.  IC: pain: much improved, urinary urgency and night time urination also improved  With Hydroxyzine: Night time urination now 1-2 night.   Has been doing the 1/4 valium 10 mg tab in vagina also.       [facility]     Question 4/9/2020  9:39 AM CDT - Filed by Patient on 4/9/2020   General Urogynecology: Are you experiencing the following?    Dysuria (painful urination) No   Nocturia:  waking up  "at night to empty your bladder  Yes   If you answered yes to the previous question, how many times does this happen per night? 3-4   Enuresis (urine loss during sleep) No   Dribbling urine after you urinate No   Hematuria (urine appears red) No   Type of stream Weak   Urinary frequency: How often a day are you going to the bathroom per day?  More than 15   Urinary Tract Infections: How many Urinary Tract Infections have you had in the past year? I have not had a UTI in the past year   If you have had a UTI in the past year, what treatments have you had so far?  I have not had a UTI in the past year   Urinary Incontinence (General): Are you experiencing the following?    Past consultation for incontinence: Have you ever seen someone for the evaluation of incontinence? No   If you answered yes to the previous question, please select all the therapies you have tried.  None of the above   Please note the effectiveness of the therapies. Ineffective   Need to wear protection to keep clothes dry  No   If you answered yes to the previous question, please giselle the protection you use.  None   If you wear protection, how much wetness is typically on each pad? N/a- I do not wear protection   If you wear protection, how often do you have to change per day, if applicable?  0   Stress Symptoms: Are you experiencing the following?    Leakage of urine with cough, laugh and/or sneeze No   If you answered yes to the previous question, what is the frequency in days, weeks and/or months? Never   Leakage of urine with sex No   Leakage of urine with bending/ lifting No   Leakage of urine with briskly walking or jogging No   If you lose urine for any other reason not previously mentioned, please note it below, if applicable.     Urge Symptoms: Are you experiencing the following?    Urgency ("got to go" feeling) Yes   Urge: How frequently do you feel an urge to urinate (feeling like you "gotta go" to the bathroom and can't wait) Never   Do " you experience a leakage of urine when you have a feeling of urgency?  No   Leakage of urine when unaware No   Past use of anticholinergics (medications used to treat overactive bladder) No   If you answered yes to the previous question, please giselle the anticholinergics you have used:     Have you ever used Mirbetriq (aka Mirabegron)?  No   Prolapse Symptoms: Are you experiencing any of the following?     Falling out/ Bulging/ Heaviness in the vagina No   Vaginal/ Abdominal Pain/ Pressure No   Need to strain/ Push to void No   Need to wait on the toilet before you void No   Unusual position to urinate (using your hands to push back the vaginal bulge) No   Sensation of incomplete emptying No   Past use of pessary device No   If you answered yes to the previous question, please list the devices you have used below.     Bowel Symptoms: Are you experiencing any of the following?    Constipation No   Diarrhea  No   Hematochezia (bloody stool) No   Incomplete evacuation of stool No   Involuntary loss of formed stool No   Fecal smearing/urgency No   Involuntary loss of gas No   Vaginal Symptoms: Are you experiencing any of the following?     Abnormal vaginal bleeding  No   Vaginal dryness No   Sexually active  Yes   Dyspareunia (painful intercourse) No   Estrogen use       GYN & OB History  No LMP recorded. Patient is premenopausal.   Date of Last Pap: No result found    OB History    Para Term  AB Living   2 2 2         SAB TAB Ectopic Multiple Live Births                  # Outcome Date GA Lbr Syed/2nd Weight Sex Delivery Anes PTL Lv   2 Term            1 Term              OB  Largest: c/s ;   Forceps: no  Episiotomy:      GYN  Menarche: 14  Menstrual cycle: 30/   Menopause: no  Hysterectomy: no  Ovaries: prersent    Past Medical History:   Diagnosis Date    Interstitial cystitis     Dr. Wheeler       Past Surgical History:   Procedure Laterality Date    COLONOSCOPY      2 PREVIOUS ONES -NORMAL. DUE  FOR REPEAT IN JUNE 2020       Current Outpatient Medications:     gabapentin (NEURONTIN) 100 MG capsule, Take 1 capsule (100 mg total) by mouth 2 (two) times daily., Disp: 60 capsule, Rfl: 0    hydroxyzine HCL (ATARAX) 10 MG Tab, Take 1 tablet (10 mg total) by mouth every evening., Disp: 30 tablet, Rfl: 11    methen-m.blue-s.phos-phsal-hyo (URIBEL) 118-10-40.8-36 mg Cap, Take 1 capsule by mouth 4 (four) times daily as needed., Disp: 120 capsule, Rfl: 3    methen-sod phos-meth blue-hyos (UROGESIC-BLUE) 81.6-40.8-0.12 mg Tab, Take 1 tablet by mouth every 6 (six) hours., Disp: 120 tablet, Rfl: 1    pentosan polysulfate (ELMIRON) 100 mg Cap, Take 100 mg by mouth 3 (three) times daily., Disp: , Rfl:     rOPINIRole (REQUIP) 0.5 MG tablet, TAKE ONE TABLET BY MOUTH TWICE DAILY AS NEEDED, Disp: 60 tablet, Rfl: 11    solifenacin (VESICARE) 5 MG tablet, Take 1 tablet (5 mg total) by mouth once daily., Disp: 90 tablet, Rfl: 3      Review of Systems  Review of Systems   Constitutional: Negative.  Negative for activity change, appetite change, chills, diaphoresis, fatigue, fever and unexpected weight change.   HENT: Negative.    Eyes: Negative.    Respiratory: Negative.  Negative for cough.    Cardiovascular: Negative.    Gastrointestinal: Negative for abdominal distention, abdominal pain, anal bleeding, blood in stool, constipation, diarrhea, nausea, rectal pain and vomiting.   Endocrine: Negative.    Genitourinary: Positive for dysuria and urgency. Negative for decreased urine volume, difficulty urinating, dyspareunia, enuresis, flank pain, frequency, genital sores, hematuria, menstrual problem, pelvic pain, vaginal bleeding, vaginal discharge and vaginal pain.        Night time urination    Musculoskeletal: Negative for back pain.   Skin: Negative for color change, pallor, rash and wound.   Allergic/Immunologic: Negative for environmental allergies, food allergies and immunocompromised state.   Hematological: Negative  for adenopathy. Does not bruise/bleed easily.   Psychiatric/Behavioral: Negative for agitation, behavioral problems, confusion and sleep disturbance.           Objective:     Physical Exam   Constitutional: She is oriented to person, place, and time. She appears well-developed and well-nourished.   HENT:   Head: Normocephalic and atraumatic.   Neck: Normal range of motion.   Pulmonary/Chest: No respiratory distress. She has no wheezes.   Musculoskeletal: Normal range of motion.   Neurological: She is alert and oriented to person, place, and time.   Psychiatric: She has a normal mood and affect. Her behavior is normal. Judgment and thought content normal.          Assessment:        1. IC (interstitial cystitis)    2. Urinary urgency               Plan:         IC  --Continue Prelief to help alkinize the urine:   --Prelief Tablets : Each tablet contains 345 mg calcium glycerophosphate (65 mg of elemental calcium). The tablets also contain 0.25% magnesium  stearate as a processing aid. Two tablets are equivalent to 690 mg calcium glycerophosphate (130mg of elemental calcium).   --Prelief Powder : Each ¼ teaspoon usage of powder is comparable to two tablets. The powder dissolves rapidly in food or non-alcoholic beverages.  Tablets are recommended for taking with alcoholic beverages   --Usage:     --Tablets: 2-3 tablets, 2 times a day.    --Powder: ¼ teaspoon of powder 2 times a day. More can be used when needed  --Start the IC diet:  https://www.ichelp.org/wp-content/uploads/2015/07/food-list.pdf   -- Continue Hydroxyzine 10 mg at night for relief of bladder pain  -- has not started; increase in Jackson but increase dosing to every 6 hours  for urinary discomfort, will resubmit the medication with increased dosing for 3 months   --Elmiron three times per day   --Stop flavoxate for now will start vesicare 5 mg daily, SE profile reviewed   --Bladder instillations discussed    --Hydrodistention With Cystoscopy: allows  physicians to take a much closer look at the bladder wall. While the AUA no longer suggests that it be used as a diagnostic method (unless a diagnosis is in doubt), hydrodistention may provide modest relief in IC symptoms which can last from three to six months. It can, however, be a more difficult, painful procedure. As a result, it requires careful consideration and discussion.   -- Keep a bladder dairy with pain diary as well- verbal review   --Urine culture - not done Rx: new rx placed for out patient   --ok continue the valium vaginally ( 1/4 0f 10 mg tablet = 2.5 mg )   --Plan for follow up in 2 months.     Approximately 20+  min were spent in consult, 100 % in discussion.     Thank you for requesting consultation of your patient.  I look forward to participating in her care.    Yeny Silva DO  Female Pelvic Medicine and Reconstructive Surgery  [facility] [place], [place]

## 2020-04-21 NOTE — PATIENT INSTRUCTIONS
Plan:         IC  --Continue Prelief to help alkinize the urine:   --Prelief Tablets : Each tablet contains 345 mg calcium glycerophosphate (65 mg of elemental calcium). The tablets also contain 0.25% magnesium  stearate as a processing aid. Two tablets are equivalent to 690 mg calcium glycerophosphate (130mg of elemental calcium).   --Prelief Powder : Each ¼ teaspoon usage of powder is comparable to two tablets. The powder dissolves rapidly in food or non-alcoholic beverages.  Tablets are recommended for taking with alcoholic beverages   --Usage:     --Tablets: 2-3 tablets, 2 times a day.    --Powder: ¼ teaspoon of powder 2 times a day. More can be used when needed  --Start the IC diet:  https://www.[employer].org/wp-content/uploads/2015/07/food-list.pdf   -- Continue Hydroxyzine 10 mg at night for relief of bladder pain  -- has not started; increase in Jackson but increase dosing to every 6 hours  for urinary discomfort, will resubmit the medication with increased dosing for 3 months   --Elmiron three times per day   --Stop flavoxate for now will start vesicare 5 mg daily, SE profile reviewed   --Bladder instillations discussed    --Hydrodistention With Cystoscopy: allows physicians to take a much closer look at the bladder wall. While the AUA no longer suggests that it be used as a diagnostic method (unless a diagnosis is in doubt), hydrodistention may provide modest relief in IC symptoms which can last from three to six months. It can, however, be a more difficult, painful procedure. As a result, it requires careful consideration and discussion.   -- Keep a bladder dairy with pain diary as well- verbal review   --Urine culture - not done Rx: new rx placed for out patient   --ok continue the valium vaginally ( 1/4 0f 10 mg tablet = 2.5 mg )   --Plan for follow up in 2 months.

## 2020-05-19 DIAGNOSIS — Z12.11 COLON CANCER SCREENING: Primary | ICD-10-CM

## 2020-05-21 ENCOUNTER — PATIENT MESSAGE (OUTPATIENT)
Dept: FAMILY MEDICINE | Facility: CLINIC | Age: 45
End: 2020-05-21

## 2020-05-21 DIAGNOSIS — Z80.0 FAMILY HISTORY OF COLON CANCER IN MOTHER: Primary | ICD-10-CM

## 2020-05-22 ENCOUNTER — PATIENT MESSAGE (OUTPATIENT)
Dept: UROGYNECOLOGY | Facility: CLINIC | Age: 45
End: 2020-05-22

## 2020-05-22 DIAGNOSIS — R35.0 URINARY FREQUENCY: Primary | ICD-10-CM

## 2020-05-25 ENCOUNTER — PATIENT MESSAGE (OUTPATIENT)
Dept: UROGYNECOLOGY | Facility: CLINIC | Age: 45
End: 2020-05-25

## 2020-05-31 ENCOUNTER — CLINICAL SUPPORT (OUTPATIENT)
Dept: URGENT CARE | Facility: CLINIC | Age: 45
End: 2020-05-31
Payer: COMMERCIAL

## 2020-05-31 DIAGNOSIS — Z12.11 COLON CANCER SCREENING: ICD-10-CM

## 2020-05-31 PROCEDURE — U0003 INFECTIOUS AGENT DETECTION BY NUCLEIC ACID (DNA OR RNA); SEVERE ACUTE RESPIRATORY SYNDROME CORONAVIRUS 2 (SARS-COV-2) (CORONAVIRUS DISEASE [COVID-19]), AMPLIFIED PROBE TECHNIQUE, MAKING USE OF HIGH THROUGHPUT TECHNOLOGIES AS DESCRIBED BY CMS-2020-01-R: HCPCS

## 2020-06-01 ENCOUNTER — ANESTHESIA EVENT (OUTPATIENT)
Dept: ENDOSCOPY | Facility: HOSPITAL | Age: 45
End: 2020-06-01
Payer: COMMERCIAL

## 2020-06-01 LAB — SARS-COV-2 RNA RESP QL NAA+PROBE: NOT DETECTED

## 2020-06-02 ENCOUNTER — HOSPITAL ENCOUNTER (OUTPATIENT)
Facility: HOSPITAL | Age: 45
Discharge: HOME | End: 2020-06-02
Attending: INTERNAL MEDICINE | Admitting: INTERNAL MEDICINE
Payer: COMMERCIAL

## 2020-06-02 ENCOUNTER — ANESTHESIA (OUTPATIENT)
Dept: ENDOSCOPY | Facility: HOSPITAL | Age: 45
End: 2020-06-02
Payer: COMMERCIAL

## 2020-06-02 VITALS
RESPIRATION RATE: 18 BRPM | HEART RATE: 74 BPM | TEMPERATURE: 98 F | SYSTOLIC BLOOD PRESSURE: 106 MMHG | DIASTOLIC BLOOD PRESSURE: 62 MMHG | OXYGEN SATURATION: 98 %

## 2020-06-02 DIAGNOSIS — Z12.11 COLON CANCER SCREENING: ICD-10-CM

## 2020-06-02 LAB — B-HCG UR QL: NEGATIVE

## 2020-06-02 PROCEDURE — 37000008 HC ANESTHESIA 1ST 15 MINUTES: Performed by: INTERNAL MEDICINE

## 2020-06-02 PROCEDURE — 45385 COLONOSCOPY W/LESION REMOVAL: CPT | Performed by: INTERNAL MEDICINE

## 2020-06-02 PROCEDURE — 27201089 HC SNARE, DISP (ANY): Performed by: INTERNAL MEDICINE

## 2020-06-02 PROCEDURE — 45385 PR COLONOSCOPY,REMV LESN,SNARE: ICD-10-PCS | Mod: 33,,, | Performed by: INTERNAL MEDICINE

## 2020-06-02 PROCEDURE — 88305 TISSUE EXAM BY PATHOLOGIST: CPT | Performed by: PATHOLOGY

## 2020-06-02 PROCEDURE — 45385 COLONOSCOPY W/LESION REMOVAL: CPT | Mod: 33,,, | Performed by: INTERNAL MEDICINE

## 2020-06-02 PROCEDURE — D9220A PRA ANESTHESIA: Mod: 33,CRNA,, | Performed by: NURSE ANESTHETIST, CERTIFIED REGISTERED

## 2020-06-02 PROCEDURE — 37000009 HC ANESTHESIA EA ADD 15 MINS: Performed by: INTERNAL MEDICINE

## 2020-06-02 PROCEDURE — D9220A PRA ANESTHESIA: Mod: 33,ANES,, | Performed by: ANESTHESIOLOGY

## 2020-06-02 PROCEDURE — 88305 TISSUE EXAM BY PATHOLOGIST: ICD-10-PCS | Mod: 26,,, | Performed by: PATHOLOGY

## 2020-06-02 PROCEDURE — 63600175 PHARM REV CODE 636 W HCPCS: Performed by: NURSE ANESTHETIST, CERTIFIED REGISTERED

## 2020-06-02 PROCEDURE — 27201012 HC FORCEPS, HOT/COLD, DISP: Performed by: INTERNAL MEDICINE

## 2020-06-02 PROCEDURE — 45380 COLONOSCOPY AND BIOPSY: CPT | Mod: 59,,, | Performed by: INTERNAL MEDICINE

## 2020-06-02 PROCEDURE — 45380 PR COLONOSCOPY,BIOPSY: ICD-10-PCS | Mod: 59,,, | Performed by: INTERNAL MEDICINE

## 2020-06-02 PROCEDURE — D9220A PRA ANESTHESIA: ICD-10-PCS | Mod: 33,CRNA,, | Performed by: NURSE ANESTHETIST, CERTIFIED REGISTERED

## 2020-06-02 PROCEDURE — 45380 COLONOSCOPY AND BIOPSY: CPT | Performed by: INTERNAL MEDICINE

## 2020-06-02 PROCEDURE — D9220A PRA ANESTHESIA: ICD-10-PCS | Mod: 33,ANES,, | Performed by: ANESTHESIOLOGY

## 2020-06-02 PROCEDURE — 88305 TISSUE EXAM BY PATHOLOGIST: CPT | Mod: 26,,, | Performed by: PATHOLOGY

## 2020-06-02 PROCEDURE — 25000003 PHARM REV CODE 250: Performed by: NURSE ANESTHETIST, CERTIFIED REGISTERED

## 2020-06-02 PROCEDURE — 81025 URINE PREGNANCY TEST: CPT

## 2020-06-02 RX ORDER — LIDOCAINE HCL/PF 100 MG/5ML
SYRINGE (ML) INTRAVENOUS
Status: DISCONTINUED | OUTPATIENT
Start: 2020-06-02 | End: 2020-06-02

## 2020-06-02 RX ORDER — PROPOFOL 10 MG/ML
INJECTION, EMULSION INTRAVENOUS
Status: DISCONTINUED
Start: 2020-06-02 | End: 2020-06-02 | Stop reason: HOSPADM

## 2020-06-02 RX ORDER — PROPOFOL 10 MG/ML
VIAL (ML) INTRAVENOUS
Status: DISCONTINUED | OUTPATIENT
Start: 2020-06-02 | End: 2020-06-02

## 2020-06-02 RX ORDER — SODIUM CHLORIDE 9 MG/ML
INJECTION, SOLUTION INTRAVENOUS CONTINUOUS PRN
Status: DISCONTINUED | OUTPATIENT
Start: 2020-06-02 | End: 2020-06-02

## 2020-06-02 RX ORDER — LIDOCAINE HYDROCHLORIDE 20 MG/ML
INJECTION, SOLUTION INFILTRATION; PERINEURAL
Status: DISCONTINUED
Start: 2020-06-02 | End: 2020-06-02 | Stop reason: HOSPADM

## 2020-06-02 RX ADMIN — PROPOFOL 20 MG: 10 INJECTION, EMULSION INTRAVENOUS at 09:06

## 2020-06-02 RX ADMIN — PROPOFOL 30 MG: 10 INJECTION, EMULSION INTRAVENOUS at 09:06

## 2020-06-02 RX ADMIN — PROPOFOL 20 MG: 10 INJECTION, EMULSION INTRAVENOUS at 10:06

## 2020-06-02 RX ADMIN — PROPOFOL 40 MG: 10 INJECTION, EMULSION INTRAVENOUS at 09:06

## 2020-06-02 RX ADMIN — PROPOFOL 30 MG: 10 INJECTION, EMULSION INTRAVENOUS at 10:06

## 2020-06-02 RX ADMIN — LIDOCAINE HYDROCHLORIDE 100 MG: 20 INJECTION, SOLUTION INTRAVENOUS at 09:06

## 2020-06-02 RX ADMIN — SODIUM CHLORIDE: 0.9 INJECTION, SOLUTION INTRAVENOUS at 09:06

## 2020-06-02 RX ADMIN — PROPOFOL 70 MG: 10 INJECTION, EMULSION INTRAVENOUS at 09:06

## 2020-06-02 NOTE — [employer] PATIENT INSTRUCTIONS
Discharge Summary/Instructions after an Endoscopic Procedure  Patient Name: Rosa Maria Kay  Patient MRN: [id number]  Patient YOB: 1975 Tuesday, June 2, 2020  Jessica Vogt MD  RESTRICTIONS:  During your procedure today, you received medications for sedation.  These   medications may affect your judgment, balance and coordination.  Therefore,   for 24 hours, you have the following restrictions:   - DO NOT drive a car, operate machinery, make legal/financial decisions,   sign important papers or drink alcohol.    ACTIVITY:  Today: no heavy lifting, straining or running due to procedural   sedation/anesthesia.  The following day: return to full activity including work.  DIET:  Eat and drink normally unless instructed otherwise.     TREATMENT FOR COMMON SIDE EFFECTS:  - Mild abdominal pain, nausea, belching, bloating or excessive gas:  rest,   eat lightly and use a heating pad.  - Sore Throat: treat with throat lozenges and/or gargle with warm salt   water.  - Because air was used during the procedure, expelling large amounts of air   from your rectum or belching is normal.  - If a bowel prep was taken, you may not have a bowel movement for 1-3 days.    This is normal.  SYMPTOMS TO WATCH FOR AND REPORT TO YOUR PHYSICIAN:  1. Abdominal pain or bloating, other than gas cramps.  2. Chest pain.  3. Back pain.  4. Signs of infection such as: chills or fever occurring within 24 hours   after the procedure.  5. Rectal bleeding, which would show as bright red, maroon, or black stools.   (A tablespoon of blood from the rectum is not serious, especially if   hemorrhoids are present.)  6. Vomiting.  7. Weakness or dizziness.  GO DIRECTLY TO THE NEAREST EMERGENCY ROOM IF YOU HAVE ANY OF THE FOLLOWING:      Difficulty breathing              Chills and/or fever over 101 F   Persistent vomiting and/or vomiting blood   Severe abdominal pain   Severe chest pain   Black, tarry stools   Bleeding- more than one tablespoon   Any  other symptom or condition that you feel may need urgent attention  Your doctor recommends these additional instructions:  If any biopsies were taken, your doctors clinic will contact you in 1 to 2   weeks with any results.  - Patient has a contact number available for emergencies.  The signs and   symptoms of potential delayed complications were discussed with the   patient.  Return to normal activities tomorrow.  Written discharge   instructions were provided to the patient.   - Discharge patient to home.   - Resume previous diet today.   - Await pathology results.   - Continue present medications.   - Repeat colonoscopy in 6 months for surveillance after piecemeal   polypectomy. If there is residual polyp or the nodularity at the   appendiceal orifice is adenomatous, may need surgical resection.  - Return to primary care physician in 1 month.   - The findings and recommendations were discussed with the patient and their   family.  For questions, problems or results please call your physician - Jessica Vogt MD at Work:  [phone].  [facility] can be reached at [phone]     IF A COMPLICATION OR EMERGENCY SITUATION ARISES AND YOU ARE UNABLE TO REACH   YOUR PHYSICIAN - GO DIRECTLY TO THE EMERGENCY ROOM.  Jessica Vogt MD  6/2/2020 10:22:49 AM  This report has been verified and signed electronically.  [employer]

## 2020-06-02 NOTE — H&P
Endoscopy Pre-Procedure H&P    Reason for Procedure: screening colonoscopy    HPI:  Pt is a 45 y.o. female who presents for screening colonoscopy.  Mom  of CRC at 44.  Her last colon was 5 years ago.  She has not had polyps.  Has a brother that's had polyps in his 40s.    Past Medical History:   Diagnosis Date    Interstitial cystitis     Dr. Wheeler       Past Surgical History:   Procedure Laterality Date    COLONOSCOPY      2 PREVIOUS ONES -NORMAL. DUE FOR REPEAT IN 2020       Family History   Problem Relation Age of Onset    Cancer Mother 44        colon       Social History     Tobacco Use    Smoking status: Never Smoker    Smokeless tobacco: Never Used   Substance Use Topics    Alcohol use: No     Frequency: Monthly or less     Drinks per session: 1 or 2     Binge frequency: Never    Drug use: No       Review of patient's allergies indicates:  No Known Allergies    No current facility-administered medications on file prior to encounter.      Current Outpatient Medications on File Prior to Encounter   Medication Sig Dispense Refill    gabapentin (NEURONTIN) 100 MG capsule Take 1 capsule (100 mg total) by mouth 2 (two) times daily. 60 capsule 0    pentosan polysulfate (ELMIRON) 100 mg Cap Take 100 mg by mouth 3 (three) times daily.         ROS: Negative x 10    Patient Vitals for the past 24 hrs:   BP Temp Pulse Resp SpO2   20 0729 106/72 98.4 °F (36.9 °C) 83 18 98 %     Gen: Well developed, well nourished, no apparent distress  HEENT: Anicteric, PERRL, MMM  CV: Regular rate and rhythm, no murmurs   Lungs: CTAB, no increased work of breathing  Abd: Soft, NT, ND, normal BS, no HSM  Ext: No C/C/E  Neuro: Alert and oriented to person, place, time; no weakness  Skin: No rashes/lesions  Psych: Normal mood and affect    Assessment:  Rosa Maria Kay is a 45 y.o. female who presents for screening colonoscopy.    Recommendations:  1. Colonoscopy  2. F/U with PCP     Jessica Vogt MD

## 2020-06-02 NOTE — LETTER
Endoscopy Scheduling Department   [address] [place] [address]  [place] [address]    01/15/2020  Medical Record # [id number]     Dear Rosa Maria Kay,    An order for the following procedure, Colonoscopy, was placed for you by YADIRA Hardin.  Multiple attempts have been made to reach you at [phone] (home) .     Please call the scheduling nurse at the [facility] to schedule this procedure [phone].     Did you know?   Colorectal cancer, the 2nd most common cancer, is extremely preventable if polyps that lead to cancer are detected and removed, and it is very curable if the cancer is detected in its early stages.   Since there are very few symptoms associated with colorectal cancer, regular screening is essential.   If you are 50 years of age or older, have a family history of polyps or colorectal cancer, or have symptoms such as bleeding, pain, change in bowels, etc. please schedule a screening or evaluation.     If you have already scheduled this appointment, please disregard this letter.    Sincerely,  [facility] Endoscopy Scheduling Dept. [phone]

## 2020-06-02 NOTE — TRANSFER OF CARE
Anesthesia Transfer of Care Note    Patient: Rosa Maria Kay    Procedure(s) Performed: Procedure(s) (LRB):  COLONOSCOPY (N/A)    Patient location: GI    Anesthesia Type: general    Transport from OR: Transported from OR on room air with adequate spontaneous ventilation    Post pain: adequate analgesia    Post assessment: no apparent anesthetic complications and tolerated procedure well    Post vital signs: stable    Level of consciousness: awake, alert and oriented    Nausea/Vomiting: no nausea/vomiting    Complications: none    Transfer of care protocol was followed      Last vitals:   Visit Vitals  BP 97/63 (BP Location: Right arm, Patient Position: Lying)   Pulse 75   Temp 36.8 °C (98.2 °F) (Oral)   Resp 18   SpO2 98%   Breastfeeding? No

## 2020-06-04 LAB
FINAL PATHOLOGIC DIAGNOSIS: NORMAL
GROSS: NORMAL

## 2020-06-08 NOTE — ANESTHESIA PREPROCEDURE EVALUATION
06/08/2020  Rosa Maria Kay is a 45 y.o., female.    Anesthesia Evaluation     I have reviewed the Nursing Notes.       Review of Systems  Anesthesia Hx:  No problems with previous Anesthesia   Social:  Non-Smoker    Cardiovascular:  Cardiovascular Normal Exercise tolerance: good     Pulmonary:  Pulmonary Normal    Renal/:  Renal/ Normal     Hepatic/GI:   Bowel Prep. Denies PUD. Denies Hiatal Hernia.  Denies GERD. Denies Liver Disease.  Denies Hepatitis.    Neurological:  Neurology Normal    Endocrine:  Endocrine Normal        Physical Exam  General:  Well nourished    Airway/Jaw/Neck:  AIRWAY FINDINGS: Normal      Chest/Lungs:  Chest/Lungs Clear    Heart/Vascular:  Heart Findings: Normal       Mental Status:  Mental Status Findings: Normal        Anesthesia Plan  Type of Anesthesia, risks & benefits discussed:  Anesthesia Type:  general  Patient's Preference:   Intra-op Monitoring Plan: standard ASA monitors  Intra-op Monitoring Plan Comments:   Post Op Pain Control Plan:   Post Op Pain Control Plan Comments:   Induction:   IV  Beta Blocker:  Patient is not currently on a Beta-Blocker (No further documentation required).       Informed Consent: Patient understands risks and agrees with Anesthesia plan.  Questions answered. Anesthesia consent signed with patient.  ASA Score: 1     Day of Surgery Review of History & Physical:  There are no significant changes.  H&P update referred to the provider.         Ready For Surgery From Anesthesia Perspective.

## 2020-06-08 NOTE — ANESTHESIA POSTPROCEDURE EVALUATION
Anesthesia Post Evaluation    Patient: Rosa Maria Kay    Procedure(s) Performed: Procedure(s) (LRB):  COLONOSCOPY (N/A)    Final Anesthesia Type: general    Patient location during evaluation: GI PACU  Patient participation: Yes- Able to Participate  Level of consciousness: awake and alert  Post-procedure vital signs: reviewed and stable  Pain management: adequate  Airway patency: patent    PONV status at discharge: No PONV  Anesthetic complications: no      Cardiovascular status: blood pressure returned to baseline and hemodynamically stable  Respiratory status: unassisted and spontaneous ventilation  Hydration status: euvolemic  Follow-up not needed.            Event Time     Out of Recovery 10:52:04          Pain/Joseph Score: No data recorded

## 2020-07-06 ENCOUNTER — TELEPHONE (OUTPATIENT)
Dept: FAMILY MEDICINE | Facility: CLINIC | Age: 45
End: 2020-07-06

## 2020-08-14 DIAGNOSIS — Z11.59 NEED FOR HEPATITIS C SCREENING TEST: ICD-10-CM

## 2020-10-02 ENCOUNTER — PATIENT MESSAGE (OUTPATIENT)
Dept: FAMILY MEDICINE | Facility: CLINIC | Age: 45
End: 2020-10-02

## 2020-10-06 ENCOUNTER — PATIENT MESSAGE (OUTPATIENT)
Dept: UROGYNECOLOGY | Facility: CLINIC | Age: 45
End: 2020-10-06

## 2020-10-08 RX ORDER — URINARY ANTISEPTIC ANTISPASMODIC 81.6; 40.8; 10.8; .12 MG/1; MG/1; MG/1; MG/1
1 TABLET ORAL EVERY 6 HOURS
Qty: 120 TABLET | Refills: 1 | Status: SHIPPED | OUTPATIENT
Start: 2020-10-08 | End: 2021-04-07 | Stop reason: SDUPTHER

## 2020-10-26 ENCOUNTER — PATIENT MESSAGE (OUTPATIENT)
Dept: FAMILY MEDICINE | Facility: CLINIC | Age: 45
End: 2020-10-26

## 2020-10-26 DIAGNOSIS — Z12.31 ENCOUNTER FOR SCREENING MAMMOGRAM FOR BREAST CANCER: Primary | ICD-10-CM

## 2020-10-26 NOTE — TELEPHONE ENCOUNTER
LOV 1/14/2020    Would Dr. Fink like to have patient come in for annual visit before mammogram?  Please advise.

## 2020-11-20 ENCOUNTER — PATIENT MESSAGE (OUTPATIENT)
Dept: FAMILY MEDICINE | Facility: CLINIC | Age: 45
End: 2020-11-20

## 2020-11-23 ENCOUNTER — OFFICE VISIT (OUTPATIENT)
Dept: FAMILY MEDICINE | Facility: CLINIC | Age: 45
End: 2020-11-23
Payer: COMMERCIAL

## 2020-11-23 VITALS
TEMPERATURE: 98 F | WEIGHT: 111.13 LBS | BODY MASS INDEX: 20.45 KG/M2 | OXYGEN SATURATION: 99 % | HEIGHT: 62 IN | HEART RATE: 75 BPM | DIASTOLIC BLOOD PRESSURE: 80 MMHG | SYSTOLIC BLOOD PRESSURE: 100 MMHG

## 2020-11-23 DIAGNOSIS — R25.3 MUSCLE FASCICULATION: ICD-10-CM

## 2020-11-23 DIAGNOSIS — G25.81 RESTLESS LEG: ICD-10-CM

## 2020-11-23 PROCEDURE — 99214 OFFICE O/P EST MOD 30 MIN: CPT | Mod: S$GLB,,, | Performed by: FAMILY MEDICINE

## 2020-11-23 PROCEDURE — 99214 PR OFFICE/OUTPT VISIT, EST, LEVL IV, 30-39 MIN: ICD-10-PCS | Mod: S$GLB,,, | Performed by: FAMILY MEDICINE

## 2020-11-23 PROCEDURE — 3008F BODY MASS INDEX DOCD: CPT | Mod: CPTII,S$GLB,, | Performed by: FAMILY MEDICINE

## 2020-11-23 PROCEDURE — 3008F PR BODY MASS INDEX (BMI) DOCUMENTED: ICD-10-PCS | Mod: CPTII,S$GLB,, | Performed by: FAMILY MEDICINE

## 2020-11-23 PROCEDURE — 99999 PR PBB SHADOW E&M-EST. PATIENT-LVL III: ICD-10-PCS | Mod: PBBFAC,,, | Performed by: FAMILY MEDICINE

## 2020-11-23 PROCEDURE — 99999 PR PBB SHADOW E&M-EST. PATIENT-LVL III: CPT | Mod: PBBFAC,,, | Performed by: FAMILY MEDICINE

## 2020-11-23 RX ORDER — GABAPENTIN 100 MG/1
100 CAPSULE ORAL 2 TIMES DAILY
Qty: 60 CAPSULE | Refills: 1 | Status: SHIPPED | OUTPATIENT
Start: 2020-11-23 | End: 2021-07-20

## 2020-11-23 RX ORDER — ROPINIROLE 0.5 MG/1
0.5 TABLET, FILM COATED ORAL 2 TIMES DAILY PRN
Qty: 60 TABLET | Refills: 11 | Status: SHIPPED | OUTPATIENT
Start: 2020-11-23 | End: 2021-08-27

## 2020-11-23 NOTE — PROGRESS NOTES
Answers for HPI/ROS submitted by the patient on 11/21/2020   activity change: No  unexpected weight change: No  neck pain: No  hearing loss: No  rhinorrhea: No  trouble swallowing: No  eye discharge: No  visual disturbance: No  chest tightness: No  wheezing: No  chest pain: No  palpitations: No  blood in stool: No  constipation: No  vomiting: No  diarrhea: No  polydipsia: No  polyuria: No  difficulty urinating: No  hematuria: No  menstrual problem: No  dysuria: No  joint swelling: No  arthralgias: No  headaches: No  weakness: No  confusion: No  dysphoric mood: No

## 2020-11-23 NOTE — PROGRESS NOTES
Subjective:       Patient ID: Rosa Maria Kay is a 45 y.o. female.    Chief Complaint: Left leg twitching feeling    45 year old female presents with left thigh muscle fasciculations again. She states it has improved since starting the gabapentin in January, but weaned off shortly after. The muscle twitching is not painful. She states at night it keeps her awake.     She also needs a refill of her requip.     She is due for a repeat colonoscopy. She used to see Dr. Beard btu is due for a repeat. She is scheduled for one on December 7th [facility] Dr. Jessica Vogt.    Past Medical History:  No date: Interstitial cystitis      Comment:  Dr. Wheeler   Past Surgical History:  No date: COLONOSCOPY      Comment:  2 PREVIOUS ONES -NORMAL. DUE FOR REPEAT IN JUNE 2020 6/2/2020: COLONOSCOPY; N/A      Comment:  Procedure: COLONOSCOPY;  Surgeon: Jessica Vogt MD;                 Location: [facility];  Service: Endoscopy;  Laterality:                N/A;  CV-19 ORDERED  Review of patient's family history indicates:  Problem: Cancer      Relation: Mother          Age of Onset: 44          Comment: colon    Social History    Socioeconomic History      Marital status:       Spouse name: Not on file      Number of children: Not on file      Years of education: Not on file      Highest education level: Not on file    Occupational History      Not on file    Social Needs      Financial resource strain: Not hard at all      Food insecurity        Worry: Never true        Inability: Never true      Transportation needs        Medical: No        Non-medical: No    Tobacco Use      Smoking status: Never Smoker      Smokeless tobacco: Never Used    Substance and Sexual Activity      Alcohol use: No        Frequency: Monthly or less        Drinks per session: 1 or 2        Binge frequency: Never      Drug use: No      Sexual activity: Yes        Partners: Male    Lifestyle      Physical activity        Days per week: 7 days         "Minutes per session: 50 min      Stress: To some extent    Relationships      Social connections        Talks on phone: Once a week        Gets together: Once a week        Attends [employer] service: Not on file        Active member of club or organization: No        Attends meetings of clubs or organizations: Never        Relationship status:     Other Topics      Concerns:        Not on file    Social History Narrative      Not on file        Review of Systems   Constitutional: Negative for activity change and unexpected weight change.   HENT: Negative for hearing loss, rhinorrhea and trouble swallowing.    Eyes: Negative for discharge and visual disturbance.   Respiratory: Negative for chest tightness and wheezing.    Cardiovascular: Negative for chest pain and palpitations.   Gastrointestinal: Negative for blood in stool, constipation, diarrhea and vomiting.   Endocrine: Negative for polydipsia and polyuria.   Genitourinary: Negative for difficulty urinating, dysuria, hematuria and menstrual problem.   Musculoskeletal: Negative for arthralgias, joint swelling and neck pain.   Neurological: Negative for weakness and headaches.   Psychiatric/Behavioral: Negative for confusion and dysphoric mood.         Objective:       Vitals:    11/23/20 1522   BP: 100/80   Pulse: 75   Temp: 98.4 °F (36.9 °C)   TempSrc: Oral   SpO2: 99%   Weight: 50.4 kg (111 lb 1.8 oz)   Height: 5' 1.5" (1.562 m)       Physical Exam  Constitutional:       Appearance: Normal appearance.   HENT:      Head: Normocephalic and atraumatic.   Neurological:      Mental Status: She is alert.         Assessment:       1. Muscle fasciculation        Plan:       Rosa Maria was seen today for left leg twitching feeling.    Diagnoses and all orders for this visit:    Muscle fasciculation  -     gabapentin (NEURONTIN) 100 MG capsule; Take 1 capsule (100 mg total) by mouth 2 (two) times daily.    restarting gabapentin. Minimize caffeine and increase sleep.    "

## 2020-12-09 ENCOUNTER — PATIENT OUTREACH (OUTPATIENT)
Dept: ADMINISTRATIVE | Facility: HOSPITAL | Age: 45
End: 2020-12-09

## 2020-12-29 ENCOUNTER — HOSPITAL ENCOUNTER (OUTPATIENT)
Dept: RADIOLOGY | Facility: HOSPITAL | Age: 45
Discharge: HOME | End: 2020-12-29
Attending: FAMILY MEDICINE
Payer: COMMERCIAL

## 2020-12-29 VITALS — HEIGHT: 62 IN | BODY MASS INDEX: 20.45 KG/M2 | WEIGHT: 111.13 LBS

## 2020-12-29 DIAGNOSIS — Z12.31 ENCOUNTER FOR SCREENING MAMMOGRAM FOR BREAST CANCER: ICD-10-CM

## 2020-12-29 PROCEDURE — 77063 BREAST TOMOSYNTHESIS BI: CPT | Mod: 26,,, | Performed by: RADIOLOGY

## 2020-12-29 PROCEDURE — 77067 MAMMO DIGITAL SCREENING BILAT WITH TOMO: ICD-10-PCS | Mod: 26,,, | Performed by: RADIOLOGY

## 2020-12-29 PROCEDURE — 77067 SCR MAMMO BI INCL CAD: CPT | Mod: TC

## 2020-12-29 PROCEDURE — 77067 SCR MAMMO BI INCL CAD: CPT | Mod: 26,,, | Performed by: RADIOLOGY

## 2020-12-29 PROCEDURE — 77063 MAMMO DIGITAL SCREENING BILAT WITH TOMO: ICD-10-PCS | Mod: 26,,, | Performed by: RADIOLOGY

## 2021-04-06 ENCOUNTER — PATIENT MESSAGE (OUTPATIENT)
Dept: UROGYNECOLOGY | Facility: CLINIC | Age: 46
End: 2021-04-06

## 2021-04-06 DIAGNOSIS — N30.10 IC (INTERSTITIAL CYSTITIS): ICD-10-CM

## 2021-04-07 DIAGNOSIS — N30.10 IC (INTERSTITIAL CYSTITIS): Primary | ICD-10-CM

## 2021-04-07 RX ORDER — URINARY ANTISEPTIC ANTISPASMODIC 81.6; 40.8; 10.8; .12 MG/1; MG/1; MG/1; MG/1
1 TABLET ORAL EVERY 6 HOURS PRN
Qty: 120 TABLET | Refills: 1 | Status: SHIPPED | OUTPATIENT
Start: 2021-04-07 | End: 2021-10-04

## 2021-04-07 RX ORDER — HYDROXYZINE HYDROCHLORIDE 10 MG/1
10 TABLET, FILM COATED ORAL NIGHTLY
Qty: 30 TABLET | Refills: 11 | Status: SHIPPED | OUTPATIENT
Start: 2021-04-07 | End: 2021-08-27

## 2021-04-13 ENCOUNTER — PATIENT OUTREACH (OUTPATIENT)
Dept: ADMINISTRATIVE | Facility: OTHER | Age: 46
End: 2021-04-13

## 2021-04-15 ENCOUNTER — OFFICE VISIT (OUTPATIENT)
Dept: UROGYNECOLOGY | Facility: CLINIC | Age: 46
End: 2021-04-15
Payer: COMMERCIAL

## 2021-04-15 VITALS
DIASTOLIC BLOOD PRESSURE: 66 MMHG | WEIGHT: 112.19 LBS | HEART RATE: 64 BPM | BODY MASS INDEX: 20.65 KG/M2 | HEIGHT: 62 IN | SYSTOLIC BLOOD PRESSURE: 110 MMHG

## 2021-04-15 DIAGNOSIS — R39.15 URINARY URGENCY: Primary | ICD-10-CM

## 2021-04-15 DIAGNOSIS — N30.10 IC (INTERSTITIAL CYSTITIS): ICD-10-CM

## 2021-04-15 PROCEDURE — 1126F AMNT PAIN NOTED NONE PRSNT: CPT | Mod: S$GLB,,, | Performed by: OBSTETRICS & GYNECOLOGY

## 2021-04-15 PROCEDURE — 3008F PR BODY MASS INDEX (BMI) DOCUMENTED: ICD-10-PCS | Mod: CPTII,S$GLB,, | Performed by: OBSTETRICS & GYNECOLOGY

## 2021-04-15 PROCEDURE — 99214 OFFICE O/P EST MOD 30 MIN: CPT | Mod: S$GLB,,, | Performed by: OBSTETRICS & GYNECOLOGY

## 2021-04-15 PROCEDURE — 99999 PR PBB SHADOW E&M-EST. PATIENT-LVL IV: ICD-10-PCS | Mod: PBBFAC,,, | Performed by: OBSTETRICS & GYNECOLOGY

## 2021-04-15 PROCEDURE — 1126F PR PAIN SEVERITY QUANTIFIED, NO PAIN PRESENT: ICD-10-PCS | Mod: S$GLB,,, | Performed by: OBSTETRICS & GYNECOLOGY

## 2021-04-15 PROCEDURE — 99999 PR PBB SHADOW E&M-EST. PATIENT-LVL IV: CPT | Mod: PBBFAC,,, | Performed by: OBSTETRICS & GYNECOLOGY

## 2021-04-15 PROCEDURE — 3008F BODY MASS INDEX DOCD: CPT | Mod: CPTII,S$GLB,, | Performed by: OBSTETRICS & GYNECOLOGY

## 2021-04-15 PROCEDURE — 99214 PR OFFICE/OUTPT VISIT, EST, LEVL IV, 30-39 MIN: ICD-10-PCS | Mod: S$GLB,,, | Performed by: OBSTETRICS & GYNECOLOGY

## 2021-04-19 ENCOUNTER — TELEPHONE (OUTPATIENT)
Dept: UROGYNECOLOGY | Facility: CLINIC | Age: 46
End: 2021-04-19

## 2021-04-20 ENCOUNTER — PATIENT MESSAGE (OUTPATIENT)
Dept: UROGYNECOLOGY | Facility: CLINIC | Age: 46
End: 2021-04-20

## 2021-04-26 ENCOUNTER — PATIENT MESSAGE (OUTPATIENT)
Dept: UROGYNECOLOGY | Facility: CLINIC | Age: 46
End: 2021-04-26

## 2021-05-11 ENCOUNTER — TELEPHONE (OUTPATIENT)
Dept: UROGYNECOLOGY | Facility: CLINIC | Age: 46
End: 2021-05-11

## 2021-05-20 RX ORDER — METHENAMINE, SODIUM PHOSPHATE, MONOBASIC, ANHYDROUS, PHENYL SALICYLATE, METHYLENE BLUE AND HYOSCYAMINE SULFATE 118; 40.8; 36; 10; .12 MG/1; MG/1; MG/1; MG/1; MG/1
1 CAPSULE ORAL 4 TIMES DAILY PRN
Qty: 120 CAPSULE | Refills: 11 | Status: SHIPPED | OUTPATIENT
Start: 2021-05-20 | End: 2022-03-31

## 2021-05-21 ENCOUNTER — TELEPHONE (OUTPATIENT)
Dept: UROGYNECOLOGY | Facility: CLINIC | Age: 46
End: 2021-05-21

## 2021-05-25 ENCOUNTER — CLINICAL SUPPORT (OUTPATIENT)
Dept: REHABILITATION | Facility: HOSPITAL | Age: 46
End: 2021-05-25
Attending: OBSTETRICS & GYNECOLOGY
Payer: COMMERCIAL

## 2021-05-25 DIAGNOSIS — M79.10 MUSCLE TENDERNESS: ICD-10-CM

## 2021-05-25 DIAGNOSIS — R27.9 LACK OF COORDINATION: ICD-10-CM

## 2021-05-25 DIAGNOSIS — N30.10 IC (INTERSTITIAL CYSTITIS): ICD-10-CM

## 2021-05-25 PROCEDURE — 97161 PT EVAL LOW COMPLEX 20 MIN: CPT | Mod: PN

## 2021-05-25 PROCEDURE — 97112 NEUROMUSCULAR REEDUCATION: CPT | Mod: PN

## 2021-05-26 ENCOUNTER — PATIENT MESSAGE (OUTPATIENT)
Dept: REHABILITATION | Facility: HOSPITAL | Age: 46
End: 2021-05-26

## 2021-06-08 ENCOUNTER — PATIENT MESSAGE (OUTPATIENT)
Dept: REHABILITATION | Facility: HOSPITAL | Age: 46
End: 2021-06-08

## 2021-06-08 ENCOUNTER — CLINICAL SUPPORT (OUTPATIENT)
Dept: REHABILITATION | Facility: HOSPITAL | Age: 46
End: 2021-06-08
Attending: OBSTETRICS & GYNECOLOGY
Payer: COMMERCIAL

## 2021-06-08 DIAGNOSIS — R27.9 LACK OF COORDINATION: ICD-10-CM

## 2021-06-08 DIAGNOSIS — M79.10 MUSCLE TENDERNESS: ICD-10-CM

## 2021-06-08 PROCEDURE — 97140 MANUAL THERAPY 1/> REGIONS: CPT | Mod: PN

## 2021-06-08 PROCEDURE — 97112 NEUROMUSCULAR REEDUCATION: CPT | Mod: PN

## 2021-06-15 ENCOUNTER — OFFICE VISIT (OUTPATIENT)
Dept: UROGYNECOLOGY | Facility: CLINIC | Age: 46
End: 2021-06-15
Payer: COMMERCIAL

## 2021-06-15 ENCOUNTER — CLINICAL SUPPORT (OUTPATIENT)
Dept: REHABILITATION | Facility: HOSPITAL | Age: 46
End: 2021-06-15
Attending: OBSTETRICS & GYNECOLOGY
Payer: COMMERCIAL

## 2021-06-15 VITALS
WEIGHT: 105 LBS | HEIGHT: 61 IN | DIASTOLIC BLOOD PRESSURE: 58 MMHG | BODY MASS INDEX: 19.83 KG/M2 | SYSTOLIC BLOOD PRESSURE: 90 MMHG

## 2021-06-15 DIAGNOSIS — R10.2 PELVIC PRESSURE IN FEMALE: Primary | ICD-10-CM

## 2021-06-15 DIAGNOSIS — R39.15 URINARY URGENCY: ICD-10-CM

## 2021-06-15 DIAGNOSIS — N30.10 IC (INTERSTITIAL CYSTITIS): ICD-10-CM

## 2021-06-15 DIAGNOSIS — M79.10 MUSCLE TENDERNESS: ICD-10-CM

## 2021-06-15 DIAGNOSIS — R27.9 LACK OF COORDINATION: ICD-10-CM

## 2021-06-15 LAB
BILIRUB SERPL-MCNC: NORMAL MG/DL
BLOOD URINE, POC: NORMAL
CLARITY, POC UA: CLEAR
COLOR, POC UA: YELLOW
GLUCOSE UR QL STRIP: NORMAL
KETONES UR QL STRIP: NORMAL
LEUKOCYTE ESTERASE URINE, POC: NORMAL
NITRITE, POC UA: NORMAL
PH, POC UA: 5
PROTEIN, POC: NORMAL
SPECIFIC GRAVITY, POC UA: 1
UROBILINOGEN, POC UA: NORMAL

## 2021-06-15 PROCEDURE — 52000 PR CYSTOURETHROSCOPY: ICD-10-PCS | Mod: S$GLB,,, | Performed by: OBSTETRICS & GYNECOLOGY

## 2021-06-15 PROCEDURE — 99999 PR PBB SHADOW E&M-EST. PATIENT-LVL IV: CPT | Mod: PBBFAC,,,

## 2021-06-15 PROCEDURE — 52000 CYSTOURETHROSCOPY: CPT | Mod: S$GLB,,, | Performed by: OBSTETRICS & GYNECOLOGY

## 2021-06-15 PROCEDURE — 97110 THERAPEUTIC EXERCISES: CPT | Mod: PN

## 2021-06-15 PROCEDURE — 1126F PR PAIN SEVERITY QUANTIFIED, NO PAIN PRESENT: ICD-10-PCS | Mod: S$GLB,,, | Performed by: OBSTETRICS & GYNECOLOGY

## 2021-06-15 PROCEDURE — 99499 UNLISTED E&M SERVICE: CPT | Mod: S$GLB,,, | Performed by: OBSTETRICS & GYNECOLOGY

## 2021-06-15 PROCEDURE — 1126F AMNT PAIN NOTED NONE PRSNT: CPT | Mod: S$GLB,,, | Performed by: OBSTETRICS & GYNECOLOGY

## 2021-06-15 PROCEDURE — 3008F BODY MASS INDEX DOCD: CPT | Mod: CPTII,S$GLB,, | Performed by: OBSTETRICS & GYNECOLOGY

## 2021-06-15 PROCEDURE — 97140 MANUAL THERAPY 1/> REGIONS: CPT | Mod: PN

## 2021-06-15 PROCEDURE — 3008F PR BODY MASS INDEX (BMI) DOCUMENTED: ICD-10-PCS | Mod: CPTII,S$GLB,, | Performed by: OBSTETRICS & GYNECOLOGY

## 2021-06-15 PROCEDURE — 99499 NO LOS: ICD-10-PCS | Mod: S$GLB,,, | Performed by: OBSTETRICS & GYNECOLOGY

## 2021-06-15 PROCEDURE — 81002 URINALYSIS NONAUTO W/O SCOPE: CPT | Mod: S$GLB,,, | Performed by: OBSTETRICS & GYNECOLOGY

## 2021-06-15 PROCEDURE — 99999 PR PBB SHADOW E&M-EST. PATIENT-LVL IV: ICD-10-PCS | Mod: PBBFAC,,,

## 2021-06-15 PROCEDURE — 81002 POCT URINE DIPSTICK WITHOUT MICROSCOPE: ICD-10-PCS | Mod: S$GLB,,, | Performed by: OBSTETRICS & GYNECOLOGY

## 2021-06-15 PROCEDURE — 97112 NEUROMUSCULAR REEDUCATION: CPT | Mod: PN

## 2021-06-15 RX ORDER — CIPROFLOXACIN 500 MG/1
500 TABLET ORAL
Status: COMPLETED | OUTPATIENT
Start: 2021-06-15 | End: 2021-06-15

## 2021-06-15 RX ORDER — LIDOCAINE HYDROCHLORIDE 20 MG/ML
JELLY TOPICAL ONCE
Status: COMPLETED | OUTPATIENT
Start: 2021-06-15 | End: 2021-06-15

## 2021-06-15 RX ADMIN — CIPROFLOXACIN 500 MG: 500 TABLET ORAL at 10:06

## 2021-06-15 RX ADMIN — LIDOCAINE HYDROCHLORIDE 5 ML: 20 JELLY TOPICAL at 10:06

## 2021-06-17 ENCOUNTER — TELEPHONE (OUTPATIENT)
Dept: UROGYNECOLOGY | Facility: CLINIC | Age: 46
End: 2021-06-17

## 2021-06-17 ENCOUNTER — PATIENT MESSAGE (OUTPATIENT)
Dept: UROGYNECOLOGY | Facility: CLINIC | Age: 46
End: 2021-06-17

## 2021-06-18 ENCOUNTER — HOSPITAL ENCOUNTER (OUTPATIENT)
Dept: RADIOLOGY | Facility: HOSPITAL | Age: 46
Discharge: HOME | End: 2021-06-18
Attending: OBSTETRICS & GYNECOLOGY
Payer: COMMERCIAL

## 2021-06-18 DIAGNOSIS — R10.2 PELVIC PRESSURE IN FEMALE: ICD-10-CM

## 2021-06-18 PROCEDURE — 76856 US PELVIS COMP WITH TRANSVAG NON-OB (XPD): ICD-10-PCS | Mod: 26,,, | Performed by: RADIOLOGY

## 2021-06-18 PROCEDURE — 76856 US EXAM PELVIC COMPLETE: CPT | Mod: TC

## 2021-06-18 PROCEDURE — 76830 TRANSVAGINAL US NON-OB: CPT | Mod: 26,,, | Performed by: RADIOLOGY

## 2021-06-18 PROCEDURE — 76830 US PELVIS COMP WITH TRANSVAG NON-OB (XPD): ICD-10-PCS | Mod: 26,,, | Performed by: RADIOLOGY

## 2021-06-18 PROCEDURE — 76856 US EXAM PELVIC COMPLETE: CPT | Mod: 26,,, | Performed by: RADIOLOGY

## 2021-06-22 ENCOUNTER — PATIENT MESSAGE (OUTPATIENT)
Dept: REHABILITATION | Facility: HOSPITAL | Age: 46
End: 2021-06-22

## 2021-06-22 ENCOUNTER — PATIENT MESSAGE (OUTPATIENT)
Dept: UROGYNECOLOGY | Facility: CLINIC | Age: 46
End: 2021-06-22

## 2021-06-30 ENCOUNTER — PATIENT MESSAGE (OUTPATIENT)
Dept: REHABILITATION | Facility: HOSPITAL | Age: 46
End: 2021-06-30

## 2021-06-30 ENCOUNTER — CLINICAL SUPPORT (OUTPATIENT)
Dept: REHABILITATION | Facility: HOSPITAL | Age: 46
End: 2021-06-30
Attending: OBSTETRICS & GYNECOLOGY
Payer: COMMERCIAL

## 2021-06-30 DIAGNOSIS — M79.10 MUSCLE TENDERNESS: ICD-10-CM

## 2021-06-30 DIAGNOSIS — R27.9 LACK OF COORDINATION: ICD-10-CM

## 2021-06-30 PROCEDURE — 97140 MANUAL THERAPY 1/> REGIONS: CPT | Mod: PN

## 2021-06-30 PROCEDURE — 97112 NEUROMUSCULAR REEDUCATION: CPT | Mod: PN

## 2021-07-09 ENCOUNTER — CLINICAL SUPPORT (OUTPATIENT)
Dept: REHABILITATION | Facility: HOSPITAL | Age: 46
End: 2021-07-09
Attending: OBSTETRICS & GYNECOLOGY
Payer: COMMERCIAL

## 2021-07-09 DIAGNOSIS — R27.9 LACK OF COORDINATION: ICD-10-CM

## 2021-07-09 DIAGNOSIS — M79.10 MUSCLE TENDERNESS: ICD-10-CM

## 2021-07-09 PROCEDURE — 97530 THERAPEUTIC ACTIVITIES: CPT | Mod: PN

## 2021-07-09 PROCEDURE — 97110 THERAPEUTIC EXERCISES: CPT | Mod: PN

## 2021-07-09 PROCEDURE — 97140 MANUAL THERAPY 1/> REGIONS: CPT | Mod: PN

## 2021-07-09 PROCEDURE — 97112 NEUROMUSCULAR REEDUCATION: CPT | Mod: PN

## 2021-07-16 ENCOUNTER — CLINICAL SUPPORT (OUTPATIENT)
Dept: REHABILITATION | Facility: HOSPITAL | Age: 46
End: 2021-07-16
Attending: OBSTETRICS & GYNECOLOGY
Payer: COMMERCIAL

## 2021-07-16 DIAGNOSIS — R27.9 LACK OF COORDINATION: ICD-10-CM

## 2021-07-16 DIAGNOSIS — M79.10 MUSCLE TENDERNESS: ICD-10-CM

## 2021-07-16 PROCEDURE — 97110 THERAPEUTIC EXERCISES: CPT | Mod: PN

## 2021-07-16 PROCEDURE — 97140 MANUAL THERAPY 1/> REGIONS: CPT | Mod: PN

## 2021-07-16 PROCEDURE — 97112 NEUROMUSCULAR REEDUCATION: CPT | Mod: PN

## 2021-07-19 ENCOUNTER — PATIENT MESSAGE (OUTPATIENT)
Dept: FAMILY MEDICINE | Facility: CLINIC | Age: 46
End: 2021-07-19

## 2021-07-23 ENCOUNTER — CLINICAL SUPPORT (OUTPATIENT)
Dept: REHABILITATION | Facility: HOSPITAL | Age: 46
End: 2021-07-23
Attending: OBSTETRICS & GYNECOLOGY
Payer: COMMERCIAL

## 2021-07-23 DIAGNOSIS — R27.9 LACK OF COORDINATION: ICD-10-CM

## 2021-07-23 DIAGNOSIS — M79.10 MUSCLE TENDERNESS: ICD-10-CM

## 2021-07-23 PROCEDURE — 97110 THERAPEUTIC EXERCISES: CPT | Mod: PN

## 2021-07-23 PROCEDURE — 97112 NEUROMUSCULAR REEDUCATION: CPT | Mod: PN

## 2021-07-23 PROCEDURE — 97140 MANUAL THERAPY 1/> REGIONS: CPT | Mod: PN

## 2021-08-05 ENCOUNTER — PATIENT MESSAGE (OUTPATIENT)
Dept: REHABILITATION | Facility: HOSPITAL | Age: 46
End: 2021-08-05

## 2021-08-10 ENCOUNTER — PATIENT MESSAGE (OUTPATIENT)
Dept: REHABILITATION | Facility: HOSPITAL | Age: 46
End: 2021-08-10

## 2021-08-10 ENCOUNTER — PATIENT MESSAGE (OUTPATIENT)
Dept: FAMILY MEDICINE | Facility: CLINIC | Age: 46
End: 2021-08-10

## 2021-08-17 ENCOUNTER — PATIENT MESSAGE (OUTPATIENT)
Dept: REHABILITATION | Facility: HOSPITAL | Age: 46
End: 2021-08-17

## 2021-08-18 ENCOUNTER — CLINICAL SUPPORT (OUTPATIENT)
Dept: REHABILITATION | Facility: HOSPITAL | Age: 46
End: 2021-08-18
Attending: OBSTETRICS & GYNECOLOGY
Payer: COMMERCIAL

## 2021-08-18 DIAGNOSIS — R27.9 LACK OF COORDINATION: ICD-10-CM

## 2021-08-18 DIAGNOSIS — M79.10 MUSCLE TENDERNESS: ICD-10-CM

## 2021-08-18 PROCEDURE — 97112 NEUROMUSCULAR REEDUCATION: CPT | Mod: PN

## 2021-08-18 PROCEDURE — 97140 MANUAL THERAPY 1/> REGIONS: CPT | Mod: PN

## 2021-08-23 ENCOUNTER — PATIENT MESSAGE (OUTPATIENT)
Dept: REHABILITATION | Facility: HOSPITAL | Age: 46
End: 2021-08-23

## 2021-08-27 ENCOUNTER — OFFICE VISIT (OUTPATIENT)
Dept: FAMILY MEDICINE | Facility: CLINIC | Age: 46
End: 2021-08-27
Payer: COMMERCIAL

## 2021-08-27 VITALS
OXYGEN SATURATION: 98 % | HEIGHT: 61 IN | HEART RATE: 86 BPM | WEIGHT: 111.56 LBS | SYSTOLIC BLOOD PRESSURE: 100 MMHG | TEMPERATURE: 98 F | BODY MASS INDEX: 21.06 KG/M2 | DIASTOLIC BLOOD PRESSURE: 60 MMHG

## 2021-08-27 DIAGNOSIS — G25.81 RESTLESS LEG: ICD-10-CM

## 2021-08-27 DIAGNOSIS — Z00.00 ANNUAL PHYSICAL EXAM: Primary | ICD-10-CM

## 2021-08-27 DIAGNOSIS — Z11.59 ENCOUNTER FOR HEPATITIS C SCREENING TEST FOR LOW RISK PATIENT: ICD-10-CM

## 2021-08-27 PROCEDURE — 99214 PR OFFICE/OUTPT VISIT, EST, LEVL IV, 30-39 MIN: ICD-10-PCS | Mod: 25,S$GLB,, | Performed by: FAMILY MEDICINE

## 2021-08-27 PROCEDURE — 3008F BODY MASS INDEX DOCD: CPT | Mod: CPTII,S$GLB,, | Performed by: FAMILY MEDICINE

## 2021-08-27 PROCEDURE — 1159F MED LIST DOCD IN RCRD: CPT | Mod: CPTII,S$GLB,, | Performed by: FAMILY MEDICINE

## 2021-08-27 PROCEDURE — 1159F PR MEDICATION LIST DOCUMENTED IN MEDICAL RECORD: ICD-10-PCS | Mod: CPTII,S$GLB,, | Performed by: FAMILY MEDICINE

## 2021-08-27 PROCEDURE — 3074F PR MOST RECENT SYSTOLIC BLOOD PRESSURE < 130 MM HG: ICD-10-PCS | Mod: CPTII,S$GLB,, | Performed by: FAMILY MEDICINE

## 2021-08-27 PROCEDURE — 99214 OFFICE O/P EST MOD 30 MIN: CPT | Mod: 25,S$GLB,, | Performed by: FAMILY MEDICINE

## 2021-08-27 PROCEDURE — 90471 IMMUNIZATION ADMIN: CPT | Mod: S$GLB,,, | Performed by: FAMILY MEDICINE

## 2021-08-27 PROCEDURE — 90715 TDAP VACCINE 7 YRS/> IM: CPT | Mod: S$GLB,,, | Performed by: FAMILY MEDICINE

## 2021-08-27 PROCEDURE — 90715 TDAP VACCINE GREATER THAN OR EQUAL TO 7YO IM: ICD-10-PCS | Mod: S$GLB,,, | Performed by: FAMILY MEDICINE

## 2021-08-27 PROCEDURE — 3078F DIAST BP <80 MM HG: CPT | Mod: CPTII,S$GLB,, | Performed by: FAMILY MEDICINE

## 2021-08-27 PROCEDURE — 3078F PR MOST RECENT DIASTOLIC BLOOD PRESSURE < 80 MM HG: ICD-10-PCS | Mod: CPTII,S$GLB,, | Performed by: FAMILY MEDICINE

## 2021-08-27 PROCEDURE — 3074F SYST BP LT 130 MM HG: CPT | Mod: CPTII,S$GLB,, | Performed by: FAMILY MEDICINE

## 2021-08-27 PROCEDURE — 99999 PR PBB SHADOW E&M-EST. PATIENT-LVL III: ICD-10-PCS | Mod: PBBFAC,,, | Performed by: FAMILY MEDICINE

## 2021-08-27 PROCEDURE — 90471 TDAP VACCINE GREATER THAN OR EQUAL TO 7YO IM: ICD-10-PCS | Mod: S$GLB,,, | Performed by: FAMILY MEDICINE

## 2021-08-27 PROCEDURE — 99999 PR PBB SHADOW E&M-EST. PATIENT-LVL III: CPT | Mod: PBBFAC,,, | Performed by: FAMILY MEDICINE

## 2021-08-27 PROCEDURE — 3008F PR BODY MASS INDEX (BMI) DOCUMENTED: ICD-10-PCS | Mod: CPTII,S$GLB,, | Performed by: FAMILY MEDICINE

## 2021-08-27 RX ORDER — ROPINIROLE 1 MG/1
1 TABLET, FILM COATED ORAL 2 TIMES DAILY
Qty: 60 TABLET | Refills: 11 | Status: SHIPPED | OUTPATIENT
Start: 2021-08-27 | End: 2022-08-31

## 2021-09-08 ENCOUNTER — PATIENT MESSAGE (OUTPATIENT)
Dept: REHABILITATION | Facility: HOSPITAL | Age: 46
End: 2021-09-08

## 2021-09-09 ENCOUNTER — LAB VISIT (OUTPATIENT)
Dept: LAB | Facility: HOSPITAL | Age: 46
End: 2021-09-09
Attending: FAMILY MEDICINE
Payer: COMMERCIAL

## 2021-09-09 DIAGNOSIS — Z11.59 ENCOUNTER FOR HEPATITIS C SCREENING TEST FOR LOW RISK PATIENT: ICD-10-CM

## 2021-09-09 DIAGNOSIS — Z00.00 ANNUAL PHYSICAL EXAM: ICD-10-CM

## 2021-09-09 DIAGNOSIS — Z11.59 NEED FOR HEPATITIS C SCREENING TEST: ICD-10-CM

## 2021-09-09 LAB
ALBUMIN SERPL BCP-MCNC: 3.8 G/DL (ref 3.5–5.2)
ALP SERPL-CCNC: 42 U/L (ref 55–135)
ALT SERPL W/O P-5'-P-CCNC: 9 U/L (ref 10–44)
ANION GAP SERPL CALC-SCNC: 6 MMOL/L (ref 8–16)
AST SERPL-CCNC: 18 U/L (ref 10–40)
BASOPHILS # BLD AUTO: 0.02 K/UL (ref 0–0.2)
BASOPHILS NFR BLD: 0.5 % (ref 0–1.9)
BILIRUB SERPL-MCNC: 0.2 MG/DL (ref 0.1–1)
BUN SERPL-MCNC: 11 MG/DL (ref 6–20)
CALCIUM SERPL-MCNC: 9.3 MG/DL (ref 8.7–10.5)
CHLORIDE SERPL-SCNC: 109 MMOL/L (ref 95–110)
CHOLEST SERPL-MCNC: 190 MG/DL (ref 120–199)
CHOLEST/HDLC SERPL: 1.9 {RATIO} (ref 2–5)
CO2 SERPL-SCNC: 24 MMOL/L (ref 23–29)
CREAT SERPL-MCNC: 0.9 MG/DL (ref 0.5–1.4)
DIFFERENTIAL METHOD: ABNORMAL
EOSINOPHIL # BLD AUTO: 0 K/UL (ref 0–0.5)
EOSINOPHIL NFR BLD: 0.8 % (ref 0–8)
ERYTHROCYTE [DISTWIDTH] IN BLOOD BY AUTOMATED COUNT: 12.6 % (ref 11.5–14.5)
EST. GFR  (AFRICAN AMERICAN): >60 ML/MIN/1.73 M^2
EST. GFR  (NON AFRICAN AMERICAN): >60 ML/MIN/1.73 M^2
GLUCOSE SERPL-MCNC: 88 MG/DL (ref 70–110)
HCT VFR BLD AUTO: 32.9 % (ref 37–48.5)
HCV AB SERPL QL IA: NEGATIVE
HCV AB SERPL QL IA: NEGATIVE
HDLC SERPL-MCNC: 99 MG/DL (ref 40–75)
HDLC SERPL: 52.1 % (ref 20–50)
HGB BLD-MCNC: 10.9 G/DL (ref 12–16)
IMM GRANULOCYTES # BLD AUTO: 0.02 K/UL (ref 0–0.04)
IMM GRANULOCYTES NFR BLD AUTO: 0.5 % (ref 0–0.5)
LDLC SERPL CALC-MCNC: 84.2 MG/DL (ref 63–159)
LYMPHOCYTES # BLD AUTO: 1.5 K/UL (ref 1–4.8)
LYMPHOCYTES NFR BLD: 37.2 % (ref 18–48)
MCH RBC QN AUTO: 32.2 PG (ref 27–31)
MCHC RBC AUTO-ENTMCNC: 33.1 G/DL (ref 32–36)
MCV RBC AUTO: 97 FL (ref 82–98)
MONOCYTES # BLD AUTO: 0.3 K/UL (ref 0.3–1)
MONOCYTES NFR BLD: 7.1 % (ref 4–15)
NEUTROPHILS # BLD AUTO: 2.1 K/UL (ref 1.8–7.7)
NEUTROPHILS NFR BLD: 53.9 % (ref 38–73)
NONHDLC SERPL-MCNC: 91 MG/DL
NRBC BLD-RTO: 0 /100 WBC
PLATELET # BLD AUTO: 200 K/UL (ref 150–450)
PMV BLD AUTO: 9.2 FL (ref 9.2–12.9)
POTASSIUM SERPL-SCNC: 4.2 MMOL/L (ref 3.5–5.1)
PROT SERPL-MCNC: 6.6 G/DL (ref 6–8.4)
RBC # BLD AUTO: 3.38 M/UL (ref 4–5.4)
SODIUM SERPL-SCNC: 139 MMOL/L (ref 136–145)
TRIGL SERPL-MCNC: 34 MG/DL (ref 30–150)
WBC # BLD AUTO: 3.92 K/UL (ref 3.9–12.7)

## 2021-09-09 PROCEDURE — 86803 HEPATITIS C AB TEST: CPT | Performed by: FAMILY MEDICINE

## 2021-09-09 PROCEDURE — 85025 COMPLETE CBC W/AUTO DIFF WBC: CPT | Performed by: FAMILY MEDICINE

## 2021-09-09 PROCEDURE — 80061 LIPID PANEL: CPT | Performed by: FAMILY MEDICINE

## 2021-09-09 PROCEDURE — 36415 COLL VENOUS BLD VENIPUNCTURE: CPT | Performed by: FAMILY MEDICINE

## 2021-09-09 PROCEDURE — 80053 COMPREHEN METABOLIC PANEL: CPT | Performed by: FAMILY MEDICINE

## 2021-09-14 ENCOUNTER — PATIENT MESSAGE (OUTPATIENT)
Dept: REHABILITATION | Facility: HOSPITAL | Age: 46
End: 2021-09-14

## 2021-09-15 ENCOUNTER — CLINICAL SUPPORT (OUTPATIENT)
Dept: REHABILITATION | Facility: HOSPITAL | Age: 46
End: 2021-09-15
Attending: OBSTETRICS & GYNECOLOGY
Payer: COMMERCIAL

## 2021-09-15 DIAGNOSIS — M79.10 MUSCLE TENDERNESS: ICD-10-CM

## 2021-09-15 DIAGNOSIS — R27.9 LACK OF COORDINATION: ICD-10-CM

## 2021-09-15 PROCEDURE — 97530 THERAPEUTIC ACTIVITIES: CPT | Mod: PN

## 2021-09-23 ENCOUNTER — PATIENT MESSAGE (OUTPATIENT)
Dept: UROGYNECOLOGY | Facility: CLINIC | Age: 46
End: 2021-09-23

## 2021-10-25 ENCOUNTER — PATIENT MESSAGE (OUTPATIENT)
Dept: FAMILY MEDICINE | Facility: CLINIC | Age: 46
End: 2021-10-25
Payer: COMMERCIAL

## 2021-10-25 DIAGNOSIS — Z12.31 ENCOUNTER FOR SCREENING MAMMOGRAM FOR BREAST CANCER: Primary | ICD-10-CM

## 2021-12-01 ENCOUNTER — PATIENT MESSAGE (OUTPATIENT)
Dept: OBSTETRICS AND GYNECOLOGY | Facility: CLINIC | Age: 46
End: 2021-12-01
Payer: COMMERCIAL

## 2021-12-30 ENCOUNTER — HOSPITAL ENCOUNTER (OUTPATIENT)
Dept: RADIOLOGY | Facility: HOSPITAL | Age: 46
Discharge: HOME | End: 2021-12-30
Attending: FAMILY MEDICINE
Payer: COMMERCIAL

## 2021-12-30 DIAGNOSIS — Z12.31 ENCOUNTER FOR SCREENING MAMMOGRAM FOR BREAST CANCER: ICD-10-CM

## 2021-12-30 PROCEDURE — 77067 SCR MAMMO BI INCL CAD: CPT | Mod: TC

## 2021-12-30 PROCEDURE — 77063 BREAST TOMOSYNTHESIS BI: CPT | Mod: 26,,, | Performed by: RADIOLOGY

## 2021-12-30 PROCEDURE — 77067 SCR MAMMO BI INCL CAD: CPT | Mod: 26,,, | Performed by: RADIOLOGY

## 2021-12-30 PROCEDURE — 77067 MAMMO DIGITAL SCREENING BILAT WITH TOMO: ICD-10-PCS | Mod: 26,,, | Performed by: RADIOLOGY

## 2021-12-30 PROCEDURE — 77063 MAMMO DIGITAL SCREENING BILAT WITH TOMO: ICD-10-PCS | Mod: 26,,, | Performed by: RADIOLOGY

## 2022-02-03 ENCOUNTER — PATIENT MESSAGE (OUTPATIENT)
Dept: UROGYNECOLOGY | Facility: CLINIC | Age: 47
End: 2022-02-03
Payer: COMMERCIAL

## 2022-02-03 DIAGNOSIS — R39.15 URINARY URGENCY: Primary | ICD-10-CM

## 2022-02-03 DIAGNOSIS — R35.0 URINARY FREQUENCY: ICD-10-CM

## 2022-02-03 NOTE — TELEPHONE ENCOUNTER
Pt switched insurance to [employer] and Myrbetriq 25mg is no longer covered. Did PA and it was denied (unfavorable)    Here is the list of alternatives they gave on denial:    DITROPAN XL, OXYBUTYNIN, SOLIFENACIN, TOVIAZ

## 2022-02-04 ENCOUNTER — PATIENT MESSAGE (OUTPATIENT)
Dept: UROGYNECOLOGY | Facility: CLINIC | Age: 47
End: 2022-02-04
Payer: COMMERCIAL

## 2022-02-04 RX ORDER — FESOTERODINE FUMARATE 4 MG/1
4 TABLET, FILM COATED, EXTENDED RELEASE ORAL DAILY
Qty: 30 TABLET | Refills: 11 | Status: SHIPPED | OUTPATIENT
Start: 2022-02-04 | End: 2022-03-31

## 2022-02-15 ENCOUNTER — PATIENT MESSAGE (OUTPATIENT)
Dept: UROGYNECOLOGY | Facility: CLINIC | Age: 47
End: 2022-02-15
Payer: COMMERCIAL

## 2022-02-15 DIAGNOSIS — R39.15 URINARY URGENCY: ICD-10-CM

## 2022-02-17 ENCOUNTER — TELEPHONE (OUTPATIENT)
Dept: PHARMACY | Facility: CLINIC | Age: 47
End: 2022-02-17
Payer: COMMERCIAL

## 2022-02-21 ENCOUNTER — TELEPHONE (OUTPATIENT)
Dept: UROGYNECOLOGY | Facility: CLINIC | Age: 47
End: 2022-02-21
Payer: COMMERCIAL

## 2022-02-21 NOTE — TELEPHONE ENCOUNTER
----- Message from Wendy Cotton sent at 2/21/2022  3:28 PM CST -----  Pt returning call  Pt can be contacted at [phone]

## 2022-02-21 NOTE — TELEPHONE ENCOUNTER
Please let patient know that she needs to contact member services and speak to a  to try to get this medication (Myrbetriq) covered. In the meantime, how is the [employer] working?     Thanks,   John browning for pt stating the about. Asked to give us call back or to send us a portal message

## 2022-03-31 ENCOUNTER — OFFICE VISIT (OUTPATIENT)
Dept: OBSTETRICS AND GYNECOLOGY | Facility: CLINIC | Age: 47
End: 2022-03-31
Attending: OBSTETRICS & GYNECOLOGY
Payer: COMMERCIAL

## 2022-03-31 ENCOUNTER — LAB VISIT (OUTPATIENT)
Dept: LAB | Facility: HOSPITAL | Age: 47
End: 2022-03-31
Attending: OBSTETRICS & GYNECOLOGY
Payer: COMMERCIAL

## 2022-03-31 VITALS
SYSTOLIC BLOOD PRESSURE: 100 MMHG | HEIGHT: 62 IN | DIASTOLIC BLOOD PRESSURE: 60 MMHG | BODY MASS INDEX: 20.67 KG/M2 | WEIGHT: 112.31 LBS

## 2022-03-31 DIAGNOSIS — R23.2 HOT FLASHES: ICD-10-CM

## 2022-03-31 DIAGNOSIS — L70.9 ACNE, UNSPECIFIED ACNE TYPE: Primary | ICD-10-CM

## 2022-03-31 DIAGNOSIS — Z13.21 ENCOUNTER FOR VITAMIN DEFICIENCY SCREENING: ICD-10-CM

## 2022-03-31 DIAGNOSIS — L70.9 ACNE, UNSPECIFIED ACNE TYPE: ICD-10-CM

## 2022-03-31 DIAGNOSIS — N95.1 PERIMENOPAUSE: ICD-10-CM

## 2022-03-31 LAB
25(OH)D3+25(OH)D2 SERPL-MCNC: 58 NG/ML (ref 30–96)
DHEA-S SERPL-MCNC: 74.4 UG/DL (ref 56.2–282.9)
FSH SERPL-ACNC: 36.11 MIU/ML
LH SERPL-ACNC: 16 MIU/ML
TESTOST SERPL-MCNC: 29 NG/DL (ref 5–73)
VIT B12 SERPL-MCNC: >2000 PG/ML (ref 210–950)

## 2022-03-31 PROCEDURE — 3008F BODY MASS INDEX DOCD: CPT | Mod: CPTII,S$GLB,, | Performed by: OBSTETRICS & GYNECOLOGY

## 2022-03-31 PROCEDURE — 3074F SYST BP LT 130 MM HG: CPT | Mod: CPTII,S$GLB,, | Performed by: OBSTETRICS & GYNECOLOGY

## 2022-03-31 PROCEDURE — 82607 VITAMIN B-12: CPT | Performed by: OBSTETRICS & GYNECOLOGY

## 2022-03-31 PROCEDURE — 3008F PR BODY MASS INDEX (BMI) DOCUMENTED: ICD-10-PCS | Mod: CPTII,S$GLB,, | Performed by: OBSTETRICS & GYNECOLOGY

## 2022-03-31 PROCEDURE — 83002 ASSAY OF GONADOTROPIN (LH): CPT | Performed by: OBSTETRICS & GYNECOLOGY

## 2022-03-31 PROCEDURE — 3074F PR MOST RECENT SYSTOLIC BLOOD PRESSURE < 130 MM HG: ICD-10-PCS | Mod: CPTII,S$GLB,, | Performed by: OBSTETRICS & GYNECOLOGY

## 2022-03-31 PROCEDURE — 82627 DEHYDROEPIANDROSTERONE: CPT | Performed by: OBSTETRICS & GYNECOLOGY

## 2022-03-31 PROCEDURE — 3078F PR MOST RECENT DIASTOLIC BLOOD PRESSURE < 80 MM HG: ICD-10-PCS | Mod: CPTII,S$GLB,, | Performed by: OBSTETRICS & GYNECOLOGY

## 2022-03-31 PROCEDURE — 99214 OFFICE O/P EST MOD 30 MIN: CPT | Mod: S$GLB,,, | Performed by: OBSTETRICS & GYNECOLOGY

## 2022-03-31 PROCEDURE — 82306 VITAMIN D 25 HYDROXY: CPT | Performed by: OBSTETRICS & GYNECOLOGY

## 2022-03-31 PROCEDURE — 3078F DIAST BP <80 MM HG: CPT | Mod: CPTII,S$GLB,, | Performed by: OBSTETRICS & GYNECOLOGY

## 2022-03-31 PROCEDURE — 84402 ASSAY OF FREE TESTOSTERONE: CPT | Performed by: OBSTETRICS & GYNECOLOGY

## 2022-03-31 PROCEDURE — 1159F MED LIST DOCD IN RCRD: CPT | Mod: CPTII,S$GLB,, | Performed by: OBSTETRICS & GYNECOLOGY

## 2022-03-31 PROCEDURE — 83001 ASSAY OF GONADOTROPIN (FSH): CPT | Performed by: OBSTETRICS & GYNECOLOGY

## 2022-03-31 PROCEDURE — 1159F PR MEDICATION LIST DOCUMENTED IN MEDICAL RECORD: ICD-10-PCS | Mod: CPTII,S$GLB,, | Performed by: OBSTETRICS & GYNECOLOGY

## 2022-03-31 PROCEDURE — 99999 PR PBB SHADOW E&M-EST. PATIENT-LVL III: ICD-10-PCS | Mod: PBBFAC,,, | Performed by: OBSTETRICS & GYNECOLOGY

## 2022-03-31 PROCEDURE — 84403 ASSAY OF TOTAL TESTOSTERONE: CPT | Performed by: OBSTETRICS & GYNECOLOGY

## 2022-03-31 PROCEDURE — 99999 PR PBB SHADOW E&M-EST. PATIENT-LVL III: CPT | Mod: PBBFAC,,, | Performed by: OBSTETRICS & GYNECOLOGY

## 2022-03-31 PROCEDURE — 99214 PR OFFICE/OUTPT VISIT, EST, LEVL IV, 30-39 MIN: ICD-10-PCS | Mod: S$GLB,,, | Performed by: OBSTETRICS & GYNECOLOGY

## 2022-03-31 RX ORDER — TAZAROTENE 0.05 MG/G
1 CREAM CUTANEOUS NIGHTLY
COMMUNITY
Start: 2022-03-01

## 2022-03-31 RX ORDER — FLUORIDE (SODIUM) 1.1 %
PASTE (ML) DENTAL
COMMUNITY
Start: 2021-12-27 | End: 2022-09-20

## 2022-03-31 RX ORDER — PROGESTERONE 100 MG/1
CAPSULE ORAL
Qty: 90 CAPSULE | Refills: 3 | Status: SHIPPED | OUTPATIENT
Start: 2022-03-31 | End: 2023-06-09

## 2022-03-31 RX ORDER — MINOCYCLINE 40 MG/G
AEROSOL, FOAM TOPICAL
COMMUNITY
Start: 2021-10-27 | End: 2022-09-20

## 2022-03-31 RX ORDER — TRIAMCINOLONE ACETONIDE 0.25 MG/G
OINTMENT TOPICAL
COMMUNITY
Start: 2022-03-14 | End: 2022-09-20

## 2022-03-31 NOTE — PROGRESS NOTES
Subjective:      Rosa Maria Kay is a 47 y.o. female who presents to discuss hormone replacement therapy.  Menarche occurred at age 14 and the patient's LMP was 21. Patient is requesting hormone replacement therapy due to hot flashes, insomnia, decreased libido, night sweats, hair loss and weight gain.  The patient has been on OCPs in November x 1 month.  She started skipping periods at 38-39.  The patient is sexually active.  She denies the following contraindications to HRT:  Vaginal bleeding, history of VTE/PE, thrombophilia,  breast cancer, or active liver disease.   She has a history of acne and is on meds for that.    PCP: Dr. Fink    Routine labs: 21  Pap smear: 21 Normal HPV neg  Mammogram: 12/3/21 Normal  DEXA: No  Colonoscopy: Yes (due to fam hx colon CA)    Past Medical History:   Diagnosis Date    Abnormal Pap smear of cervix     CKC done (Dr Cote @ [facility]) - normla paps after    Encounter for Essure implantation 2017    HPV in female 2017    Interstitial cystitis     Dr. Wheeler    Puberty, precocious      Past Surgical History:   Procedure Laterality Date     SECTION      COLD KNIFE CONIZATION OF CERVIX      w/ ESSURE     COLONOSCOPY  2020    NORMAL. Repat 3 yrs --  DR Jessica Vogt     COLONOSCOPY N/A 2020    Procedure: COLONOSCOPY;  Surgeon: Jessica Vogt MD;  [facility];  Service: Endoscopy;  Laterality: N/A;  CV-19 ORDERED    VAGINAL BIRTH AFTER  SECTION       Social History     Tobacco Use    Smoking status: Never Smoker    Smokeless tobacco: Never Used   Substance Use Topics    Alcohol use: Yes     Comment: Occational    Drug use: No     Family History   Problem Relation Age of Onset    Cancer Mother 44        colon    Colon cancer Mother     No Known Problems Father     No Known Problems Brother     No Known Problems Brother     Cancer Maternal Aunt         Lymphoma ??     Colon cancer  "Paternal Aunt     No Known Problems Paternal Grandfather     Heart attack Paternal Grandmother     Heart disease Maternal Grandmother     Diabetes Maternal Grandfather     Stroke Maternal Grandfather     Breast cancer Neg Hx      OB History    Para Term  AB Living   2 2 1 1   2   SAB IAB Ectopic Multiple Live Births           2      # Outcome Date GA Lbr Syed/2nd Weight Sex Delivery Anes PTL Lv   2 Term  40w0d  3.43 kg (7 lb 9 oz) M    VANESA   1   34w0d  3.005 kg (6 lb 10 oz) M CS-LTranv   VANESA       Current Outpatient Medications:     AMZEEQ 4 % Foam, , Disp: , Rfl:     DAILY MULTI-VITAMIN ORAL, Take by mouth., Disp: , Rfl:     methen-sod phos-meth blue-hyos (UROGESIC-BLUE/URYL) 81.6-40.8-0.12 mg Tab, TAKE ONE TABLET BY MOUTH EVERY 6 HOURS AS NEEDED (MAXIMUM OF 5 DAYS) FOR BLADDER PAIN, Disp: 120 tablet, Rfl: 1    rOPINIRole (REQUIP) 1 MG tablet, Take 1 tablet (1 mg total) by mouth 2 (two) times a day., Disp: 60 tablet, Rfl: 11    TAZORAC 0.05 % Crea cream, Apply 1 application topically nightly., Disp: , Rfl:     triamcinolone acetonide 0.025% (KENALOG) 0.025 % Oint, APPLY TO AFFECTED AREA TWICE DAILY. no longer than 2 WEEKS AT A TIME, Disp: , Rfl:     PREVIDENT 5000 BOOSTER PLUS 1.1 % Pste, use EVERY DAY EVERY NIGHT AT BEDTIME IN place OF regular toothpaste, expectorate, dont eat drink OR rinse FOR 30min, Disp: , Rfl:     progesterone (PROMETRIUM) 100 MG capsule, Take 1 capsule by mouth 30-60 minutes before bed every night, Disp: 90 capsule, Rfl: 3    Vitals:    22 0903   BP: 100/60   Weight: 51 kg (112 lb 5.2 oz)   Height: 5' 2" (1.575 m)   PainSc: 0-No pain     Body mass index is 20.54 kg/m².    Assessment:    Acne, unspecified acne type  -     DHEA-Sulfate; Future; Expected date: 2022  -     Testosterone; Future; Expected date: 2022  -     Testosterone, Free; Future; Expected date: 2022  -     Luteinizing Hormone; Future; Expected date: " 03/31/2022    Hot flashes  -     Follicle Stimulating Hormone; Future; Expected date: 03/31/2022    Perimenopause  -     progesterone (PROMETRIUM) 100 MG capsule; Take 1 capsule by mouth 30-60 minutes before bed every night  Dispense: 90 capsule; Refill: 3  -     Testosterone; Future; Expected date: 03/31/2022  -     Testosterone, Free; Future; Expected date: 03/31/2022    Encounter for vitamin deficiency screening  -     Vitamin D; Future; Expected date: 03/31/2022  -     Vitamin B12; Future; Expected date: 03/31/2022        Plan:   Risks and benefits of hormone replacement therapy were discussed.  Hormone replacement therapy options, including bioidentical versus non-bioidentical hormones, as well as alternatives discussed.  Labs above ordered.  I reviewed the patient's last CBC, CMP, and lipid panel.  Start:   Progesterone 100 mg orally QPM  Consider:   Testosterone cream 2%.  Apply one pump to the labia minora (which are the lips on the vulva without hair around the vaginal opening) every evening before bed.  Do not wear underwear to avoid removal.  Avoid any direct skin to skin contact in that area for at least 4 hours after application to avoid transfer to another person.  Also, please wash hands thoroughly after application to avoid transfer to pets or children. FDA warning for MI, stroke, and DVT reviewed.  Patient is aware this is off-label use.  Recheck free and total testosterone levels 2 months after starting cream.    Follow up in 4 months  Will recheck labs once on typical optimal dose or if having side effects.  Instructed patient to call if she experiences any side effects or has any questions.

## 2022-04-05 LAB — TESTOST FREE SERPL-MCNC: <0.4 PG/ML

## 2022-06-02 ENCOUNTER — LAB VISIT (OUTPATIENT)
Dept: LAB | Facility: HOSPITAL | Age: 47
End: 2022-06-02
Attending: OBSTETRICS & GYNECOLOGY
Payer: COMMERCIAL

## 2022-06-02 DIAGNOSIS — N95.1 PERIMENOPAUSE: ICD-10-CM

## 2022-06-02 PROCEDURE — 84402 ASSAY OF FREE TESTOSTERONE: CPT | Performed by: OBSTETRICS & GYNECOLOGY

## 2022-06-02 PROCEDURE — 36415 COLL VENOUS BLD VENIPUNCTURE: CPT | Performed by: OBSTETRICS & GYNECOLOGY

## 2022-06-02 PROCEDURE — 84403 ASSAY OF TOTAL TESTOSTERONE: CPT | Performed by: OBSTETRICS & GYNECOLOGY

## 2022-06-03 LAB — TESTOST SERPL-MCNC: 24 NG/DL (ref 5–73)

## 2022-06-06 ENCOUNTER — PATIENT MESSAGE (OUTPATIENT)
Dept: OBSTETRICS AND GYNECOLOGY | Facility: CLINIC | Age: 47
End: 2022-06-06
Payer: COMMERCIAL

## 2022-06-06 LAB — TESTOST FREE SERPL-MCNC: <0.4 PG/ML

## 2022-06-07 DIAGNOSIS — N95.1 PERIMENOPAUSE: Primary | ICD-10-CM

## 2022-06-07 RX ORDER — TESTOSTERONE CYPIONATE 200 MG/ML
50 INJECTION, SOLUTION INTRAMUSCULAR
Status: DISCONTINUED | OUTPATIENT
Start: 2022-06-07 | End: 2022-08-01

## 2022-06-08 ENCOUNTER — CLINICAL SUPPORT (OUTPATIENT)
Dept: OBSTETRICS AND GYNECOLOGY | Facility: CLINIC | Age: 47
End: 2022-06-08
Payer: COMMERCIAL

## 2022-06-08 DIAGNOSIS — N95.1 PERIMENOPAUSE: Primary | ICD-10-CM

## 2022-06-08 PROCEDURE — 99999 PR PBB SHADOW E&M-EST. PATIENT-LVL II: ICD-10-PCS | Mod: PBBFAC,,,

## 2022-06-08 PROCEDURE — 99999 PR PBB SHADOW E&M-EST. PATIENT-LVL II: CPT | Mod: PBBFAC,,,

## 2022-06-08 PROCEDURE — 96372 PR INJECTION,THERAP/PROPH/DIAG2ST, IM OR SUBCUT: ICD-10-PCS | Mod: S$GLB,,, | Performed by: OBSTETRICS & GYNECOLOGY

## 2022-06-08 PROCEDURE — 96372 THER/PROPH/DIAG INJ SC/IM: CPT | Mod: S$GLB,,, | Performed by: OBSTETRICS & GYNECOLOGY

## 2022-06-08 RX ADMIN — TESTOSTERONE CYPIONATE 50 MG: 200 INJECTION, SOLUTION INTRAMUSCULAR at 02:06

## 2022-06-08 NOTE — PROGRESS NOTES
...Ordering Provider  Dr. QUINTIN Tinsley       6/8/22 Pt administered #1 50 mg of testosterone to the right upper outer glut. Pt tolerated well. Pt instructed next injection is due on 7/1/22. Pt verbalizes understanding.       Pain Before:  None    Pain After: None    Patient Complaints: None

## 2022-06-29 DIAGNOSIS — N30.10 IC (INTERSTITIAL CYSTITIS): ICD-10-CM

## 2022-06-29 RX ORDER — URINARY ANTISEPTIC ANTISPASMODIC 81.6; 40.8; 10.8; .12 MG/1; MG/1; MG/1; MG/1
TABLET ORAL
Qty: 120 TABLET | Refills: 1 | OUTPATIENT
Start: 2022-06-29

## 2022-07-01 ENCOUNTER — CLINICAL SUPPORT (OUTPATIENT)
Dept: OBSTETRICS AND GYNECOLOGY | Facility: CLINIC | Age: 47
End: 2022-07-01
Payer: COMMERCIAL

## 2022-07-01 DIAGNOSIS — N95.1 PERIMENOPAUSE: Primary | ICD-10-CM

## 2022-07-01 PROCEDURE — 99999 PR PBB SHADOW E&M-EST. PATIENT-LVL II: CPT | Mod: PBBFAC,,,

## 2022-07-01 PROCEDURE — 99999 PR PBB SHADOW E&M-EST. PATIENT-LVL II: ICD-10-PCS | Mod: PBBFAC,,,

## 2022-07-01 PROCEDURE — 96372 THER/PROPH/DIAG INJ SC/IM: CPT | Mod: S$GLB,,, | Performed by: OBSTETRICS & GYNECOLOGY

## 2022-07-01 PROCEDURE — 96372 PR INJECTION,THERAP/PROPH/DIAG2ST, IM OR SUBCUT: ICD-10-PCS | Mod: S$GLB,,, | Performed by: OBSTETRICS & GYNECOLOGY

## 2022-07-01 RX ADMIN — TESTOSTERONE CYPIONATE 50 MG: 200 INJECTION, SOLUTION INTRAMUSCULAR at 10:07

## 2022-07-01 NOTE — PROGRESS NOTES
...Ordering Provider  Dr. QUINTIN Tinsley       7/1/22 Pt administered 50 mg of testosterone to the left upper outer glut. Pt tolerated well. Pt instructed next injection is due on 7/25/22 with testosterone levels to be drawn prior to medication administration. Pt verbalizes understanding.       Pain Before:  None    Pain After: None    Patient Complaints: None

## 2022-07-25 ENCOUNTER — LAB VISIT (OUTPATIENT)
Dept: LAB | Facility: HOSPITAL | Age: 47
End: 2022-07-25
Attending: FAMILY MEDICINE
Payer: COMMERCIAL

## 2022-07-25 ENCOUNTER — CLINICAL SUPPORT (OUTPATIENT)
Dept: OBSTETRICS AND GYNECOLOGY | Facility: CLINIC | Age: 47
End: 2022-07-25
Payer: COMMERCIAL

## 2022-07-25 DIAGNOSIS — N95.1 PERIMENOPAUSE: ICD-10-CM

## 2022-07-25 DIAGNOSIS — N95.1 PERIMENOPAUSE: Primary | ICD-10-CM

## 2022-07-25 LAB — TESTOST SERPL-MCNC: 31 NG/DL (ref 5–73)

## 2022-07-25 PROCEDURE — 84402 ASSAY OF FREE TESTOSTERONE: CPT | Performed by: OBSTETRICS & GYNECOLOGY

## 2022-07-25 PROCEDURE — 99999 PR PBB SHADOW E&M-EST. PATIENT-LVL II: CPT | Mod: PBBFAC,,,

## 2022-07-25 PROCEDURE — 84403 ASSAY OF TOTAL TESTOSTERONE: CPT | Performed by: OBSTETRICS & GYNECOLOGY

## 2022-07-25 PROCEDURE — 96372 PR INJECTION,THERAP/PROPH/DIAG2ST, IM OR SUBCUT: ICD-10-PCS | Mod: S$GLB,,, | Performed by: OBSTETRICS & GYNECOLOGY

## 2022-07-25 PROCEDURE — 96372 THER/PROPH/DIAG INJ SC/IM: CPT | Mod: S$GLB,,, | Performed by: OBSTETRICS & GYNECOLOGY

## 2022-07-25 PROCEDURE — 99999 PR PBB SHADOW E&M-EST. PATIENT-LVL II: ICD-10-PCS | Mod: PBBFAC,,,

## 2022-07-25 RX ADMIN — TESTOSTERONE CYPIONATE 50 MG: 200 INJECTION, SOLUTION INTRAMUSCULAR at 01:07

## 2022-07-25 NOTE — PROGRESS NOTES
...Ordering Provider  Dr. QUINTIN Tinsley       7/25/22 Pt administered 50 mg of testosterone to the right upper outer glut. Pt tolerated well. Pt instructed next injection is due on 8/22/22. Pt verbalizes understanding.       Pain Before:  None    Pain After: None    Patient Complaints: None

## 2022-07-26 ENCOUNTER — PATIENT MESSAGE (OUTPATIENT)
Dept: OBSTETRICS AND GYNECOLOGY | Facility: CLINIC | Age: 47
End: 2022-07-26
Payer: COMMERCIAL

## 2022-07-28 LAB — TESTOST FREE SERPL-MCNC: 0.5 PG/ML

## 2022-07-29 ENCOUNTER — PATIENT MESSAGE (OUTPATIENT)
Dept: OBSTETRICS AND GYNECOLOGY | Facility: CLINIC | Age: 47
End: 2022-07-29
Payer: COMMERCIAL

## 2022-08-01 DIAGNOSIS — N95.1 PERIMENOPAUSE: Primary | ICD-10-CM

## 2022-08-01 RX ORDER — TESTOSTERONE CYPIONATE 200 MG/ML
64 INJECTION, SOLUTION INTRAMUSCULAR
Status: SHIPPED | OUTPATIENT
Start: 2022-08-01 | End: 2023-01-16

## 2022-08-16 DIAGNOSIS — L70.9 ACNE, UNSPECIFIED ACNE TYPE: Primary | ICD-10-CM

## 2022-08-16 RX ORDER — SPIRONOLACTONE 50 MG/1
50 TABLET, FILM COATED ORAL DAILY
Qty: 30 TABLET | Refills: 11 | Status: SHIPPED | OUTPATIENT
Start: 2022-08-16 | End: 2023-03-20

## 2022-08-22 ENCOUNTER — CLINICAL SUPPORT (OUTPATIENT)
Dept: OBSTETRICS AND GYNECOLOGY | Facility: CLINIC | Age: 47
End: 2022-08-22
Payer: COMMERCIAL

## 2022-08-22 DIAGNOSIS — N95.1 PERIMENOPAUSE: Primary | ICD-10-CM

## 2022-08-22 PROCEDURE — 99999 PR PBB SHADOW E&M-EST. PATIENT-LVL I: ICD-10-PCS | Mod: PBBFAC,,,

## 2022-08-22 PROCEDURE — 96372 PR INJECTION,THERAP/PROPH/DIAG2ST, IM OR SUBCUT: ICD-10-PCS | Mod: S$GLB,,, | Performed by: OBSTETRICS & GYNECOLOGY

## 2022-08-22 PROCEDURE — 96372 THER/PROPH/DIAG INJ SC/IM: CPT | Mod: S$GLB,,, | Performed by: OBSTETRICS & GYNECOLOGY

## 2022-08-22 PROCEDURE — 99999 PR PBB SHADOW E&M-EST. PATIENT-LVL I: CPT | Mod: PBBFAC,,,

## 2022-08-22 RX ADMIN — TESTOSTERONE CYPIONATE 64 MG: 200 INJECTION, SOLUTION INTRAMUSCULAR at 03:08

## 2022-08-22 NOTE — PROGRESS NOTES
...Ordering Provider  Dr. QUINTIN Tinsley       8/22/22 Pt administered #1 64 mg of testosterone to the left upper outer glut. Pt tolerated well. Pt instructed next injection is due on 9/19/22. Pt verbalizes understanding.       Pain Before:  None    Pain After: None    Patient Complaints: None

## 2022-09-19 ENCOUNTER — PATIENT MESSAGE (OUTPATIENT)
Dept: FAMILY MEDICINE | Facility: CLINIC | Age: 47
End: 2022-09-19
Payer: COMMERCIAL

## 2022-09-19 ENCOUNTER — PATIENT MESSAGE (OUTPATIENT)
Dept: OBSTETRICS AND GYNECOLOGY | Facility: CLINIC | Age: 47
End: 2022-09-19
Payer: COMMERCIAL

## 2022-09-19 DIAGNOSIS — Z00.00 ANNUAL PHYSICAL EXAM: Primary | ICD-10-CM

## 2022-09-20 ENCOUNTER — PATIENT MESSAGE (OUTPATIENT)
Dept: FAMILY MEDICINE | Facility: CLINIC | Age: 47
End: 2022-09-20

## 2022-09-20 ENCOUNTER — OFFICE VISIT (OUTPATIENT)
Dept: FAMILY MEDICINE | Facility: CLINIC | Age: 47
End: 2022-09-20
Payer: COMMERCIAL

## 2022-09-20 VITALS
BODY MASS INDEX: 20.29 KG/M2 | OXYGEN SATURATION: 97 % | HEART RATE: 76 BPM | SYSTOLIC BLOOD PRESSURE: 100 MMHG | DIASTOLIC BLOOD PRESSURE: 60 MMHG | HEIGHT: 62 IN | WEIGHT: 110.25 LBS | TEMPERATURE: 98 F

## 2022-09-20 DIAGNOSIS — Z12.31 ENCOUNTER FOR SCREENING MAMMOGRAM FOR BREAST CANCER: ICD-10-CM

## 2022-09-20 DIAGNOSIS — Z00.00 ANNUAL PHYSICAL EXAM: Primary | ICD-10-CM

## 2022-09-20 DIAGNOSIS — M25.521 ELBOW PAIN, RIGHT: ICD-10-CM

## 2022-09-20 PROCEDURE — 3078F DIAST BP <80 MM HG: CPT | Mod: CPTII,S$GLB,, | Performed by: FAMILY MEDICINE

## 2022-09-20 PROCEDURE — 99999 PR PBB SHADOW E&M-EST. PATIENT-LVL IV: ICD-10-PCS | Mod: PBBFAC,,, | Performed by: FAMILY MEDICINE

## 2022-09-20 PROCEDURE — 3008F PR BODY MASS INDEX (BMI) DOCUMENTED: ICD-10-PCS | Mod: CPTII,S$GLB,, | Performed by: FAMILY MEDICINE

## 2022-09-20 PROCEDURE — 1159F PR MEDICATION LIST DOCUMENTED IN MEDICAL RECORD: ICD-10-PCS | Mod: CPTII,S$GLB,, | Performed by: FAMILY MEDICINE

## 2022-09-20 PROCEDURE — 3074F SYST BP LT 130 MM HG: CPT | Mod: CPTII,S$GLB,, | Performed by: FAMILY MEDICINE

## 2022-09-20 PROCEDURE — 99396 PR PREVENTIVE VISIT,EST,40-64: ICD-10-PCS | Mod: S$GLB,,, | Performed by: FAMILY MEDICINE

## 2022-09-20 PROCEDURE — 99396 PREV VISIT EST AGE 40-64: CPT | Mod: S$GLB,,, | Performed by: FAMILY MEDICINE

## 2022-09-20 PROCEDURE — 1159F MED LIST DOCD IN RCRD: CPT | Mod: CPTII,S$GLB,, | Performed by: FAMILY MEDICINE

## 2022-09-20 PROCEDURE — 3078F PR MOST RECENT DIASTOLIC BLOOD PRESSURE < 80 MM HG: ICD-10-PCS | Mod: CPTII,S$GLB,, | Performed by: FAMILY MEDICINE

## 2022-09-20 PROCEDURE — 99999 PR PBB SHADOW E&M-EST. PATIENT-LVL IV: CPT | Mod: PBBFAC,,, | Performed by: FAMILY MEDICINE

## 2022-09-20 PROCEDURE — 3008F BODY MASS INDEX DOCD: CPT | Mod: CPTII,S$GLB,, | Performed by: FAMILY MEDICINE

## 2022-09-20 PROCEDURE — 3074F PR MOST RECENT SYSTOLIC BLOOD PRESSURE < 130 MM HG: ICD-10-PCS | Mod: CPTII,S$GLB,, | Performed by: FAMILY MEDICINE

## 2022-09-20 RX ORDER — DICLOFENAC SODIUM 10 MG/G
GEL TOPICAL
Qty: 100 G | Refills: 5 | Status: SHIPPED | OUTPATIENT
Start: 2022-09-20 | End: 2023-03-20

## 2022-09-20 NOTE — PROGRESS NOTES
Answers submitted by the patient for this visit:  Review of Systems Questionnaire (Submitted on 9/16/2022)  activity change: No  unexpected weight change: No  neck pain: No  hearing loss: No  rhinorrhea: No  trouble swallowing: No  eye discharge: No  visual disturbance: Yes  chest tightness: No  wheezing: No  chest pain: No  palpitations: No  blood in stool: No  constipation: No  vomiting: No  diarrhea: No  polydipsia: No  polyuria: No  difficulty urinating: No  hematuria: No  menstrual problem: No  dysuria: No  joint swelling: No  arthralgias: No  headaches: No  weakness: No  confusion: No  dysphoric mood: No

## 2022-09-20 NOTE — PROGRESS NOTES
Subjective:       Patient ID: Rosa Maria Kay is a 47 y.o. female.    Chief Complaint: Annual Exam    47 yea rold here for an annual exam.     She has been receiving testosterone injections due to weight gain, fatigue, and lack of energy she has started injectjions, bu thas not noticed any improvement.     She has some right elbow pain. It can radiate up her arm or down to her forearm. She states it started after she moved. She has had advil and it helps some. She is also massaging it.     Past Medical History:   Diagnosis Date    Abnormal Pap smear of cervix 2017    CK done (Dr Cote @ [facility]/[place]) - normla paps after    Encounter for Essure implantation 2017    HPV in female 2017    Interstitial cystitis      Liam    Puberty, precocious       Past Surgical History:   Procedure Laterality Date     SECTION      COLD KNIFE CONIZATION OF CERVIX      w/ ESSURE     COLONOSCOPY  2020    NORMAL. Repat 3 yrs --  DR Jessica Vogt     COLONOSCOPY N/A 2020    Procedure: COLONOSCOPY;  Surgeon: Jessica Vogt MD;  Location: [facility];  Service: Endoscopy;  Laterality: N/A;  CV-19 ORDERED    VAGINAL BIRTH AFTER  SECTION       Family History   Problem Relation Age of Onset    Cancer Mother 44        colon    Colon cancer Mother     No Known Problems Father     No Known Problems Brother     No Known Problems Brother     Cancer Maternal Aunt         Lymphoma ??     Colon cancer Paternal Aunt     No Known Problems Paternal Grandfather     Heart attack Paternal Grandmother     Heart disease Maternal Grandmother     Diabetes Maternal Grandfather     Stroke Maternal Grandfather     Breast cancer Neg Hx      Social History     Socioeconomic History    Marital status:    Tobacco Use    Smoking status: Never    Smokeless tobacco: Never   Substance and Sexual Activity    Alcohol use: Yes     Comment: Occational    Drug use: No    Sexual activity: Yes     Partners: Male      Birth control/protection: Surgical     Comment: :     NASREEN  2017     Social Determinants of Health     Financial Resource Strain: Low Risk     Difficulty of Paying Living Expenses: Not hard at all   Food Insecurity: No Food Insecurity    Worried About Running Out of Food in the Last Year: Never true    Ran Out of Food in the Last Year: Never true   Transportation Needs: No Transportation Needs    Lack of Transportation (Medical): No    Lack of Transportation (Non-Medical): No   Physical Activity: Sufficiently Active    Days of Exercise per Week: 7 days    Minutes of Exercise per Session: 50 min   Stress: No Stress Concern Present    Feeling of Stress : Only a little   Social Connections: Unknown    Frequency of Communication with Friends and Family: Once a week    Frequency of Social Gatherings with Friends and Family: Patient refused    Active Member of Clubs or Organizations: No    Attends Club or Organization Meetings: Patient refused    Marital Status:    Housing Stability: Low Risk     Unable to Pay for Housing in the Last Year: No    Number of Places Lived in the Last Year: 2    Unstable Housing in the Last Year: No       Review of Systems   Constitutional:  Negative for activity change and unexpected weight change.   HENT:  Negative for hearing loss, rhinorrhea and trouble swallowing.    Eyes:  Positive for visual disturbance. Negative for discharge.   Respiratory:  Negative for chest tightness, shortness of breath and wheezing.    Cardiovascular:  Negative for chest pain and palpitations.   Gastrointestinal:  Negative for abdominal pain, blood in stool, constipation, diarrhea and vomiting.   Endocrine: Negative for polydipsia and polyuria.   Genitourinary:  Negative for difficulty urinating, dysuria, hematuria and menstrual problem.   Musculoskeletal:  Negative for arthralgias, joint swelling and neck pain.   Neurological:  Negative for dizziness, syncope, weakness, light-headedness and  "headaches.   Psychiatric/Behavioral:  Negative for confusion and dysphoric mood.        Objective:      Vitals:    09/20/22 0944   BP: 100/60   Pulse: 76   Temp: 98.1 °F (36.7 °C)   TempSrc: Oral   SpO2: 97%   Weight: 50 kg (110 lb 3.7 oz)   Height: 5' 2" (1.575 m)   Body mass index is 20.16 kg/m².      Physical Exam  Constitutional:       General: She is not in acute distress.     Appearance: Normal appearance. She is well-developed. She is not ill-appearing, toxic-appearing or diaphoretic.   HENT:      Head: Normocephalic and atraumatic.      Right Ear: External ear normal.      Left Ear: External ear normal.      Mouth/Throat:      Pharynx: No oropharyngeal exudate.   Eyes:      General: No scleral icterus.     Conjunctiva/sclera: Conjunctivae normal.      Pupils: Pupils are equal, round, and reactive to light.   Neck:      Thyroid: No thyromegaly.      Vascular: No carotid bruit or JVD.   Cardiovascular:      Rate and Rhythm: Normal rate and regular rhythm.      Heart sounds: Normal heart sounds. No murmur heard.    No friction rub. No gallop.   Pulmonary:      Effort: Pulmonary effort is normal. No respiratory distress.      Breath sounds: Normal breath sounds. No stridor. No wheezing, rhonchi or rales.   Abdominal:      General: Bowel sounds are normal. There is no distension.      Palpations: Abdomen is soft. There is no mass.      Tenderness: There is no abdominal tenderness. There is no guarding or rebound.      Hernia: No hernia is present.   Genitourinary:     Labia:         Right: No rash or lesion.         Left: No rash or lesion.       Vagina: No signs of injury. No bleeding.      Cervix: No cervical motion tenderness, discharge or friability.      Rectum: Normal. No external hemorrhoid or internal hemorrhoid. Normal anal tone.   Musculoskeletal:         General: No swelling.      Cervical back: Normal range of motion and neck supple.      Comments: Normal gait   Lymphadenopathy:      Cervical: No " cervical adenopathy.   Skin:     Findings: No rash.   Neurological:      Mental Status: She is alert and oriented to person, place, and time.   Psychiatric:         Mood and Affect: Mood normal.         Behavior: Behavior normal.       Assessment:       Problem List Items Addressed This Visit    None  Visit Diagnoses       Annual physical exam    -  Primary    Elbow pain, right        Relevant Medications    diclofenac sodium (VOLTAREN) 1 % Gel    Other Relevant Orders    Ambulatory referral/consult to Orthopedics    Encounter for screening mammogram for breast cancer        Relevant Orders    Mammo Digital Screening Bilat            Plan:       Rosa Maria was seen today for annual exam.    Diagnoses and all orders for this visit:    Annual physical exam  Labs were drawn this morning. Mammogram in December.     Elbow pain, right  -     Ambulatory referral/consult to Orthopedics; Future  -     diclofenac sodium (VOLTAREN) 1 % Gel; Upper extremities: Apply the gel (2 g) to the affected area 4 times daily. Do not apply more than 8 g daily to any one affected joint of the upper extremities.    Encounter for screening mammogram for breast cancer  -     Mammo Digital Screening Bilat; Future

## 2022-09-23 DIAGNOSIS — M25.521 RIGHT ELBOW PAIN: Primary | ICD-10-CM

## 2022-09-28 ENCOUNTER — OFFICE VISIT (OUTPATIENT)
Dept: ORTHOPEDICS | Facility: CLINIC | Age: 47
End: 2022-09-28
Attending: ORTHOPAEDIC SURGERY
Payer: COMMERCIAL

## 2022-09-28 VITALS
RESPIRATION RATE: 18 BRPM | HEART RATE: 78 BPM | SYSTOLIC BLOOD PRESSURE: 100 MMHG | WEIGHT: 113.31 LBS | DIASTOLIC BLOOD PRESSURE: 64 MMHG | HEIGHT: 62 IN | BODY MASS INDEX: 20.85 KG/M2 | OXYGEN SATURATION: 97 %

## 2022-09-28 DIAGNOSIS — M25.521 ELBOW PAIN, RIGHT: ICD-10-CM

## 2022-09-28 PROCEDURE — 3078F DIAST BP <80 MM HG: CPT | Mod: CPTII,S$GLB,, | Performed by: ORTHOPAEDIC SURGERY

## 2022-09-28 PROCEDURE — 3074F SYST BP LT 130 MM HG: CPT | Mod: CPTII,S$GLB,, | Performed by: ORTHOPAEDIC SURGERY

## 2022-09-28 PROCEDURE — 1159F MED LIST DOCD IN RCRD: CPT | Mod: CPTII,S$GLB,, | Performed by: ORTHOPAEDIC SURGERY

## 2022-09-28 PROCEDURE — 99204 PR OFFICE/OUTPT VISIT, NEW, LEVL IV, 45-59 MIN: ICD-10-PCS | Mod: S$GLB,,, | Performed by: ORTHOPAEDIC SURGERY

## 2022-09-28 PROCEDURE — 3008F PR BODY MASS INDEX (BMI) DOCUMENTED: ICD-10-PCS | Mod: CPTII,S$GLB,, | Performed by: ORTHOPAEDIC SURGERY

## 2022-09-28 PROCEDURE — 1160F PR REVIEW ALL MEDS BY PRESCRIBER/CLIN PHARMACIST DOCUMENTED: ICD-10-PCS | Mod: CPTII,S$GLB,, | Performed by: ORTHOPAEDIC SURGERY

## 2022-09-28 PROCEDURE — 99204 OFFICE O/P NEW MOD 45 MIN: CPT | Mod: S$GLB,,, | Performed by: ORTHOPAEDIC SURGERY

## 2022-09-28 PROCEDURE — 1159F PR MEDICATION LIST DOCUMENTED IN MEDICAL RECORD: ICD-10-PCS | Mod: CPTII,S$GLB,, | Performed by: ORTHOPAEDIC SURGERY

## 2022-09-28 PROCEDURE — 3008F BODY MASS INDEX DOCD: CPT | Mod: CPTII,S$GLB,, | Performed by: ORTHOPAEDIC SURGERY

## 2022-09-28 PROCEDURE — 3074F PR MOST RECENT SYSTOLIC BLOOD PRESSURE < 130 MM HG: ICD-10-PCS | Mod: CPTII,S$GLB,, | Performed by: ORTHOPAEDIC SURGERY

## 2022-09-28 PROCEDURE — 99999 PR PBB SHADOW E&M-EST. PATIENT-LVL IV: ICD-10-PCS | Mod: PBBFAC,,, | Performed by: ORTHOPAEDIC SURGERY

## 2022-09-28 PROCEDURE — 1160F RVW MEDS BY RX/DR IN RCRD: CPT | Mod: CPTII,S$GLB,, | Performed by: ORTHOPAEDIC SURGERY

## 2022-09-28 PROCEDURE — 99999 PR PBB SHADOW E&M-EST. PATIENT-LVL IV: CPT | Mod: PBBFAC,,, | Performed by: ORTHOPAEDIC SURGERY

## 2022-09-28 PROCEDURE — 3078F PR MOST RECENT DIASTOLIC BLOOD PRESSURE < 80 MM HG: ICD-10-PCS | Mod: CPTII,S$GLB,, | Performed by: ORTHOPAEDIC SURGERY

## 2022-09-28 RX ORDER — MELOXICAM 15 MG/1
15 TABLET ORAL DAILY
Qty: 30 TABLET | Refills: 0 | Status: SHIPPED | OUTPATIENT
Start: 2022-09-28 | End: 2023-03-20

## 2022-09-28 NOTE — PROGRESS NOTES
Assessment: 47 y.o. female with right lateral epicondylitis     I explained my diagnostic impression and the reasoning behind it in detail, using layman's terms.     Plan:   - Wrist brace   - tennis elbow HEP   - Mobic 15 mg PO QD x 2 weeks then PRN. The patient was advised that NSAID-type medications have important potential side effects: gastrointestinal irritation, GI bleeding, cardiac effects and renal injuries. Take the medication with food and to stop and call the office for any GI upset, vomiting, abdominal pain or black/bloody stools. The patient expresses understanding of these issues and questions were answered.   - Will observe numbness. Does not correlate with PIN n dist   - Return to clinic in 12 weeks. Return sooner if symptoms worsen or fail to improve.    All questions were answered in detail. The patient is in full agreement with the treatment plan and will proceed accordingly.    Chief Complaint   Patient presents with    Right Elbow - Numbness, Pain       Initial visit (9/28/22): Rosa Maria Kay is a 47 y.o. female who presents today complaining of Numbness and Pain of the Right Elbow     Duration of symptoms:  about 3 months   Trauma or new activity: yes - pain started when she was moving   Pain is constant  Localizes pain to ECRB, radiates down forearm and it feels bruised. Does have occ numbness into the ring finger   Aggravating factors: gripping, lifting   Relieving factors: rest   Prior treatment:   OTC NSAIDS - PO and topical. Sometimes massage helps -f eels this causes spasms of her finger   Pain does interfere with activities of daily living .      This patient was seen in consultation at the request of Dr. Noris Fink    This is the extent of the patient's complaints at this time.     Hand dominance: Left     Occupation:Assistant principle       Review of patient's allergies indicates:  No Known Allergies      Current Outpatient Medications:     DAILY MULTI-VITAMIN ORAL, Take  "by mouth., Disp: , Rfl:     diclofenac sodium (VOLTAREN) 1 % Gel, Upper extremities: Apply the gel (2 g) to the affected area 4 times daily. Do not apply more than 8 g daily to any one affected joint of the upper extremities., Disp: 100 g, Rfl: 5    methen-sod phos-meth blue-hyos (UROGESIC-BLUE/URYL) 81.6-40.8-0.12 mg Tab, TAKE ONE TABLET BY MOUTH EVERY 6 HOURS AS NEEDED (MAXIMUM OF 5 DAYS) FOR BLADDER PAIN, Disp: 60 tablet, Rfl: 0    progesterone (PROMETRIUM) 100 MG capsule, Take 1 capsule by mouth 30-60 minutes before bed every night, Disp: 90 capsule, Rfl: 3    rOPINIRole (REQUIP) 1 MG tablet, TAKE ONE TABLET BY MOUTH TWICE DAILY, Disp: 60 tablet, Rfl: 11    spironolactone (ALDACTONE) 50 MG tablet, Take 1 tablet (50 mg total) by mouth once daily., Disp: 30 tablet, Rfl: 11    TAZORAC 0.05 % Crea cream, Apply 1 application topically nightly., Disp: , Rfl:     Current Facility-Administered Medications:     testosterone cypionate injection 64 mg, 64 mg, Intramuscular, Q28 Days, Polly Tinsley MD, 64 mg at 08/22/22 1507    Physical Exam:   Vitals:    09/28/22 1426   BP: 100/64   Pulse: 78   Resp: 18   SpO2: 97%   Weight: 51.4 kg (113 lb 5.1 oz)   Height: 5' 2" (1.575 m)   PainSc:   4   PainLoc: Elbow       General:   There is no height or weight on file to calculate BMI.  Patient is alert, awake and oriented to time, place and person. Mood and affect are appropriate.  Patient does not appear to be in any distress, denies any constitutional symptoms and appears stated age.   HEENT:  Pupils are equal and round, sclera are not injected. External examination of ears and nose reveals no abnormalities. Cranial nerves II-X are grossly intact  Skin:  no rashes, abrasions or open wounds on the affected extremity   Resp:  No respiratory distress or audible wheezing   CV: 2+  pulses, all extremities warm and well perfused   Right Upper extremity   + tinels over PIN but sensation in ulnar n dist on dorsal ring finger  + TTP " ECRB  + pain with resisted wrist extension  No pain with resisted 2/3rd digit extension   LTSI m/u/r  2+ RP  + EPL, IO, FDS, FDP      Imaging: 3 views off the right elbow negative for fractures, degenerative changes     I personally reviewed and interpreted the patient's imaging obtained today in clinic       A note notifying Dr. Noris Fink of my findings was sent via the electronic medical record     This note was created by combination of typed  and M-Modal dictation. Transcription and phonetic errors may be present.  If there are any questions, please contact me.    Past Medical History:   Diagnosis Date    Abnormal Pap smear of cervix 2017    CKC done (Dr Cote @ [facility]/[place]) - normla paps after    Encounter for Essure implantation     HPV in female 2017    Interstitial cystitis     Dr. Wheeler    Puberty, precocious        Active Problem List with Overview Notes    Diagnosis Date Noted    Lack of coordination 2021    Muscle tenderness 2021    Interstitial cystitis      Dr. Wheeler         Past Surgical History:   Procedure Laterality Date     SECTION      COLD KNIFE CONIZATION OF CERVIX      w/ ESSURE     COLONOSCOPY  2020    NORMAL. Repat 3 yrs --  DR Jessica Vogt     COLONOSCOPY N/A 2020    Procedure: COLONOSCOPY;  Surgeon: Jessica Vogt MD;  Location: [facility];  Service: Endoscopy;  Laterality: N/A;  CV-19 ORDERED    VAGINAL BIRTH AFTER  SECTION         Family History   Problem Relation Age of Onset    Cancer Mother 44        colon    Colon cancer Mother     No Known Problems Father     No Known Problems Brother     No Known Problems Brother     Cancer Maternal Aunt         Lymphoma ??     Colon cancer Paternal Aunt     No Known Problems Paternal Grandfather     Heart attack Paternal Grandmother     Heart disease Maternal Grandmother     Diabetes Maternal Grandfather     Stroke Maternal Grandfather     Breast cancer Neg Hx

## 2022-10-17 ENCOUNTER — PATIENT MESSAGE (OUTPATIENT)
Dept: ORTHOPEDICS | Facility: CLINIC | Age: 47
End: 2022-10-17
Payer: COMMERCIAL

## 2022-11-05 ENCOUNTER — PATIENT MESSAGE (OUTPATIENT)
Dept: FAMILY MEDICINE | Facility: CLINIC | Age: 47
End: 2022-11-05
Payer: COMMERCIAL

## 2022-11-05 DIAGNOSIS — N30.10 IC (INTERSTITIAL CYSTITIS): ICD-10-CM

## 2022-11-07 RX ORDER — URINARY ANTISEPTIC ANTISPASMODIC 81.6; 40.8; 10.8; .12 MG/1; MG/1; MG/1; MG/1
TABLET ORAL
Qty: 60 TABLET | Refills: 0 | OUTPATIENT
Start: 2022-11-07

## 2022-11-07 NOTE — TELEPHONE ENCOUNTER
Last Office Visit Info:   The patient's last visit with Noris Fink MD was on 9/20/2022.    The patient's last visit in current department was on 9/20/2022.        Last CBC Results:   Lab Results   Component Value Date    WBC 3.28 (L) 09/20/2022    HGB 11.7 (L) 09/20/2022    HCT 36.5 (L) 09/20/2022     09/20/2022       Last CMP Results  Lab Results   Component Value Date     09/20/2022    K 4.6 09/20/2022     09/20/2022    CO2 27 09/20/2022    BUN 12 09/20/2022    CREATININE 0.8 09/20/2022    CALCIUM 9.9 09/20/2022    ALBUMIN 4.4 09/20/2022    AST 18 09/20/2022    ALT 10 09/20/2022       Last Lipids  Lab Results   Component Value Date    CHOL 225 (H) 09/20/2022    TRIG 44 09/20/2022     (H) 09/20/2022    LDLCALC 113.2 09/20/2022       Last A1C  Lab Results   Component Value Date    HGBA1C 5.5 06/20/2017       Last TSH  Lab Results   Component Value Date    TSH 1.609 06/20/2017             Current Med Refills  Medication List with Changes/Refills   Current Medications    DAILY MULTI-VITAMIN ORAL    Take by mouth.       Start Date: --        End Date: --    DICLOFENAC SODIUM (VOLTAREN) 1 % GEL    Upper extremities: Apply the gel (2 g) to the affected area 4 times daily. Do not apply more than 8 g daily to any one affected joint of the upper extremities.       Start Date: 9/20/2022 End Date: --    MELOXICAM (MOBIC) 15 MG TABLET    Take 1 tablet (15 mg total) by mouth once daily.       Start Date: 9/28/2022 End Date: --    METHEN-SOD PHOS-METH BLUE-HYOS (UROGESIC-BLUE/URYL) 81.6-40.8-0.12 MG TAB    TAKE ONE TABLET BY MOUTH EVERY 6 HOURS AS NEEDED (MAXIMUM OF 5 DAYS) FOR BLADDER PAIN       Start Date: 6/29/2022 End Date: --    PROGESTERONE (PROMETRIUM) 100 MG CAPSULE    Take 1 capsule by mouth 30-60 minutes before bed every night       Start Date: 3/31/2022 End Date: --    ROPINIROLE (REQUIP) 1 MG TABLET    TAKE ONE TABLET BY MOUTH TWICE DAILY       Start Date: 8/31/2022 End Date: --     SPIRONOLACTONE (ALDACTONE) 50 MG TABLET    Take 1 tablet (50 mg total) by mouth once daily.       Start Date: 8/16/2022 End Date: 8/16/2023    TAZORAC 0.05 % CREA CREAM    Apply 1 application topically nightly.       Start Date: 3/1/2022  End Date: --       Order(s) placed per written order guidelines:     Please advise.

## 2022-11-14 ENCOUNTER — PATIENT MESSAGE (OUTPATIENT)
Dept: FAMILY MEDICINE | Facility: CLINIC | Age: 47
End: 2022-11-14
Payer: COMMERCIAL

## 2022-11-15 DIAGNOSIS — N30.10 IC (INTERSTITIAL CYSTITIS): ICD-10-CM

## 2022-11-15 NOTE — TELEPHONE ENCOUNTER
Patients last visit 06/15/2021    Will send message in regards to needing appointment before filling

## 2022-11-16 ENCOUNTER — PATIENT MESSAGE (OUTPATIENT)
Dept: UROGYNECOLOGY | Facility: CLINIC | Age: 47
End: 2022-11-16
Payer: COMMERCIAL

## 2022-11-16 RX ORDER — URINARY ANTISEPTIC ANTISPASMODIC 81.6; 40.8; 10.8; .12 MG/1; MG/1; MG/1; MG/1
TABLET ORAL
Qty: 60 TABLET | Refills: 0 | Status: SHIPPED | OUTPATIENT
Start: 2022-11-16 | End: 2023-05-08

## 2022-12-21 ENCOUNTER — OFFICE VISIT (OUTPATIENT)
Dept: ORTHOPEDICS | Facility: CLINIC | Age: 47
End: 2022-12-21
Payer: COMMERCIAL

## 2022-12-21 DIAGNOSIS — M77.11 RIGHT LATERAL EPICONDYLITIS: ICD-10-CM

## 2022-12-21 DIAGNOSIS — M25.521 RIGHT ELBOW PAIN: Primary | ICD-10-CM

## 2022-12-21 DIAGNOSIS — M25.552 HIP PAIN, LEFT: ICD-10-CM

## 2022-12-21 PROCEDURE — 1160F PR REVIEW ALL MEDS BY PRESCRIBER/CLIN PHARMACIST DOCUMENTED: ICD-10-PCS | Mod: CPTII,S$GLB,, | Performed by: ORTHOPAEDIC SURGERY

## 2022-12-21 PROCEDURE — 1160F RVW MEDS BY RX/DR IN RCRD: CPT | Mod: CPTII,S$GLB,, | Performed by: ORTHOPAEDIC SURGERY

## 2022-12-21 PROCEDURE — 1159F PR MEDICATION LIST DOCUMENTED IN MEDICAL RECORD: ICD-10-PCS | Mod: CPTII,S$GLB,, | Performed by: ORTHOPAEDIC SURGERY

## 2022-12-21 PROCEDURE — 1159F MED LIST DOCD IN RCRD: CPT | Mod: CPTII,S$GLB,, | Performed by: ORTHOPAEDIC SURGERY

## 2022-12-21 PROCEDURE — 99999 PR PBB SHADOW E&M-EST. PATIENT-LVL III: ICD-10-PCS | Mod: PBBFAC,,, | Performed by: ORTHOPAEDIC SURGERY

## 2022-12-21 PROCEDURE — 99213 OFFICE O/P EST LOW 20 MIN: CPT | Mod: S$GLB,,, | Performed by: ORTHOPAEDIC SURGERY

## 2022-12-21 PROCEDURE — 99999 PR PBB SHADOW E&M-EST. PATIENT-LVL III: CPT | Mod: PBBFAC,,, | Performed by: ORTHOPAEDIC SURGERY

## 2022-12-21 PROCEDURE — 99213 PR OFFICE/OUTPT VISIT, EST, LEVL III, 20-29 MIN: ICD-10-PCS | Mod: S$GLB,,, | Performed by: ORTHOPAEDIC SURGERY

## 2022-12-21 NOTE — PROGRESS NOTES
Assessment: 47 y.o. female with right lateral epicondylitis     I explained my diagnostic impression and the reasoning behind it in detail, using layman's terms.     Plan:   -Continue wrist brace, NSAIDs, stretching - is improving   - hip pain suspicious for possible labral pathology. Will start with activity modification - avoid running 4-6 weeks, NSAIDs, and stretching. She will let me know if this does not settle down and we can consider PT referral. If no improvement with PT will refer to Dr Schwab    All questions were answered in detail. The patient is in full agreement with the treatment plan and will proceed accordingly.    CC: R lateral epicondylitis      Initial visit (9/28/22): Rosa Maria Kay is a 47 y.o. female who presents today complaining of No chief complaint on file.     Duration of symptoms:  about 3 months   Trauma or new activity: yes - pain started when she was moving   Pain is constant  Localizes pain to ECRB, radiates down forearm and it feels bruised. Does have occ numbness into the ring finger   Aggravating factors: gripping, lifting   Relieving factors: rest   Prior treatment:   OTC NSAIDS - PO and topical. Sometimes massage helps -f eels this causes spasms of her finger   Pain does interfere with activities of daily living .    12/21/22  Pain is much better   Doing brace, stretches, NSAIDs   Able to workout     Now having hip pain and popping with running for about 2-3 weeks along with pain over HS insertion  No injury   Not better w 2 weeks of rest     This is the extent of the patient's complaints at this time.     Hand dominance: Left     Occupation:Assistant principle       Review of patient's allergies indicates:  No Known Allergies      Current Outpatient Medications:     DAILY MULTI-VITAMIN ORAL, Take by mouth., Disp: , Rfl:     diclofenac sodium (VOLTAREN) 1 % Gel, Upper extremities: Apply the gel (2 g) to the affected area 4 times daily. Do not apply more than 8 g daily to  any one affected joint of the upper extremities., Disp: 100 g, Rfl: 5    meloxicam (MOBIC) 15 MG tablet, Take 1 tablet (15 mg total) by mouth once daily., Disp: 30 tablet, Rfl: 0    methen-sod phos-meth blue-hyos (UROGESIC-BLUE/URYL) 81.6-40.8-0.12 mg Tab, TAKE ONE TABLET BY MOUTH EVERY 6 HOURS AS NEEDED (MAXIMUM OF 5 DAYS) FOR BLADDER PAIN, Disp: 60 tablet, Rfl: 0    progesterone (PROMETRIUM) 100 MG capsule, Take 1 capsule by mouth 30-60 minutes before bed every night, Disp: 90 capsule, Rfl: 3    rOPINIRole (REQUIP) 1 MG tablet, TAKE ONE TABLET BY MOUTH TWICE DAILY, Disp: 60 tablet, Rfl: 11    spironolactone (ALDACTONE) 50 MG tablet, Take 1 tablet (50 mg total) by mouth once daily., Disp: 30 tablet, Rfl: 11    TAZORAC 0.05 % Crea cream, Apply 1 application topically nightly., Disp: , Rfl:     Current Facility-Administered Medications:     testosterone cypionate injection 64 mg, 64 mg, Intramuscular, Q28 Days, Polly Tinsley MD, 64 mg at 08/22/22 1507    Physical Exam:   Vitals:    12/21/22 1224   PainSc: 0-No pain         General:   There is no height or weight on file to calculate BMI.  Patient is alert, awake and oriented to time, place and person. Mood and affect are appropriate.  Patient does not appear to be in any distress, denies any constitutional symptoms and appears stated age.   HEENT:  Pupils are equal and round, sclera are not injected. External examination of ears and nose reveals no abnormalities. Cranial nerves II-X are grossly intact  Skin:  no rashes, abrasions or open wounds on the affected extremity   Resp:  No respiratory distress or audible wheezing   CV: 2+  pulses, all extremities warm and well perfused   Right Upper extremity   + tinels over PIN but sensation in ulnar n dist on dorsal ring finger  + TTP ECRB  + pain with resisted wrist extension  No pain with resisted 2/3rd digit extension   LTSI m/u/r  2+ RP  + EPL, IO, FDS, FDP     L hip   TTP over ischium  Pain over HS insertion with  SLR   + pain and popping with EDITH and testing of anterior labrum     Imaging: 3 views off the right elbow negative for fractures, degenerative changes     I personally reviewed and interpreted the patient's imaging obtained today in clinic       This note was created by combination of typed  and M-Modal dictation. Transcription and phonetic errors may be present.  If there are any questions, please contact me.    Past Medical History:   Diagnosis Date    Abnormal Pap smear of cervix 2017    CKC done (Dr Cote @ [facility]/[place]) - normla paps after    Encounter for Essure implantation     HPV in female 2017    Interstitial cystitis     Dr. Wheeler    Puberty, precocious        Active Problem List with Overview Notes    Diagnosis Date Noted    Lack of coordination 2021    Muscle tenderness 2021    Interstitial cystitis      Dr. Wheeler         Past Surgical History:   Procedure Laterality Date     SECTION      COLD KNIFE CONIZATION OF CERVIX      w/ ESSURE     COLONOSCOPY  2020    NORMAL. Repat 3 yrs --  DR Jessica Vogt     COLONOSCOPY N/A 2020    Procedure: COLONOSCOPY;  Surgeon: Jessica Vogt MD;  Location: [facility];  Service: Endoscopy;  Laterality: N/A;  CV-19 ORDERED    VAGINAL BIRTH AFTER  SECTION         Family History   Problem Relation Age of Onset    Cancer Mother 44        colon    Colon cancer Mother     No Known Problems Father     No Known Problems Brother     No Known Problems Brother     Cancer Maternal Aunt         Lymphoma ??     Colon cancer Paternal Aunt     No Known Problems Paternal Grandfather     Heart attack Paternal Grandmother     Heart disease Maternal Grandmother     Diabetes Maternal Grandfather     Stroke Maternal Grandfather     Breast cancer Neg Hx

## 2023-01-04 ENCOUNTER — HOSPITAL ENCOUNTER (OUTPATIENT)
Dept: RADIOLOGY | Facility: HOSPITAL | Age: 48
Discharge: HOME | End: 2023-01-04
Attending: FAMILY MEDICINE
Payer: COMMERCIAL

## 2023-01-04 DIAGNOSIS — Z12.31 ENCOUNTER FOR SCREENING MAMMOGRAM FOR BREAST CANCER: ICD-10-CM

## 2023-01-04 PROCEDURE — 77067 SCR MAMMO BI INCL CAD: CPT | Mod: TC

## 2023-01-04 PROCEDURE — 77067 MAMMO DIGITAL SCREENING BILAT WITH TOMO: ICD-10-PCS | Mod: 26,,, | Performed by: RADIOLOGY

## 2023-01-04 PROCEDURE — 77063 BREAST TOMOSYNTHESIS BI: CPT | Mod: 26,,, | Performed by: RADIOLOGY

## 2023-01-04 PROCEDURE — 77067 SCR MAMMO BI INCL CAD: CPT | Mod: 26,,, | Performed by: RADIOLOGY

## 2023-01-04 PROCEDURE — 77063 MAMMO DIGITAL SCREENING BILAT WITH TOMO: ICD-10-PCS | Mod: 26,,, | Performed by: RADIOLOGY

## 2023-01-17 ENCOUNTER — OFFICE VISIT (OUTPATIENT)
Dept: UROGYNECOLOGY | Facility: CLINIC | Age: 48
End: 2023-01-17
Payer: COMMERCIAL

## 2023-01-17 ENCOUNTER — PATIENT MESSAGE (OUTPATIENT)
Dept: UROGYNECOLOGY | Facility: CLINIC | Age: 48
End: 2023-01-17

## 2023-01-17 VITALS
DIASTOLIC BLOOD PRESSURE: 70 MMHG | WEIGHT: 114.88 LBS | BODY MASS INDEX: 21.14 KG/M2 | SYSTOLIC BLOOD PRESSURE: 108 MMHG | HEIGHT: 62 IN

## 2023-01-17 DIAGNOSIS — N30.10 IC (INTERSTITIAL CYSTITIS): ICD-10-CM

## 2023-01-17 DIAGNOSIS — R10.2 PELVIC PRESSURE IN FEMALE: Primary | ICD-10-CM

## 2023-01-17 DIAGNOSIS — R39.15 URINARY URGENCY: ICD-10-CM

## 2023-01-17 DIAGNOSIS — N30.10 IC (INTERSTITIAL CYSTITIS): Primary | ICD-10-CM

## 2023-01-17 PROCEDURE — 3008F BODY MASS INDEX DOCD: CPT | Mod: CPTII,S$GLB,, | Performed by: OBSTETRICS & GYNECOLOGY

## 2023-01-17 PROCEDURE — 99213 PR OFFICE/OUTPT VISIT, EST, LEVL III, 20-29 MIN: ICD-10-PCS | Mod: S$GLB,,, | Performed by: OBSTETRICS & GYNECOLOGY

## 2023-01-17 PROCEDURE — 3008F PR BODY MASS INDEX (BMI) DOCUMENTED: ICD-10-PCS | Mod: CPTII,S$GLB,, | Performed by: OBSTETRICS & GYNECOLOGY

## 2023-01-17 PROCEDURE — 99999 PR PBB SHADOW E&M-EST. PATIENT-LVL IV: ICD-10-PCS | Mod: PBBFAC,,, | Performed by: OBSTETRICS & GYNECOLOGY

## 2023-01-17 PROCEDURE — 3074F SYST BP LT 130 MM HG: CPT | Mod: CPTII,S$GLB,, | Performed by: OBSTETRICS & GYNECOLOGY

## 2023-01-17 PROCEDURE — 99213 OFFICE O/P EST LOW 20 MIN: CPT | Mod: S$GLB,,, | Performed by: OBSTETRICS & GYNECOLOGY

## 2023-01-17 PROCEDURE — 99999 PR PBB SHADOW E&M-EST. PATIENT-LVL IV: CPT | Mod: PBBFAC,,, | Performed by: OBSTETRICS & GYNECOLOGY

## 2023-01-17 PROCEDURE — 1159F PR MEDICATION LIST DOCUMENTED IN MEDICAL RECORD: ICD-10-PCS | Mod: CPTII,S$GLB,, | Performed by: OBSTETRICS & GYNECOLOGY

## 2023-01-17 PROCEDURE — 1159F MED LIST DOCD IN RCRD: CPT | Mod: CPTII,S$GLB,, | Performed by: OBSTETRICS & GYNECOLOGY

## 2023-01-17 PROCEDURE — 3074F PR MOST RECENT SYSTOLIC BLOOD PRESSURE < 130 MM HG: ICD-10-PCS | Mod: CPTII,S$GLB,, | Performed by: OBSTETRICS & GYNECOLOGY

## 2023-01-17 PROCEDURE — 3078F DIAST BP <80 MM HG: CPT | Mod: CPTII,S$GLB,, | Performed by: OBSTETRICS & GYNECOLOGY

## 2023-01-17 PROCEDURE — 3078F PR MOST RECENT DIASTOLIC BLOOD PRESSURE < 80 MM HG: ICD-10-PCS | Mod: CPTII,S$GLB,, | Performed by: OBSTETRICS & GYNECOLOGY

## 2023-01-17 RX ORDER — CONJUGATED ESTROGENS 0.62 MG/G
CREAM VAGINAL
Qty: 45 G | Refills: 12 | Status: SHIPPED | OUTPATIENT
Start: 2023-01-17 | End: 2023-06-09

## 2023-01-17 NOTE — PATIENT INSTRUCTIONS
IC  --Prelief to help alkinize the urine:   --Prelief Tablets : Each tablet contains 345 mg calcium glycerophosphate (65 mg of elemental calcium). The tablets also contain 0.25% magnesium  stearate as a processing aid. Two tablets are equivalent to 690 mg calcium glycerophosphate (130mg of elemental calcium).   --Prelief Powder : Each ¼ teaspoon usage of powder is comparable to two tablets. The powder dissolves rapidly in food or non-alcoholic beverages.  Tablets are recommended for taking with alcoholic beverages   --Usage:     --Tablets: 2-3 tablets, 2 times a day.    --Powder: ¼ teaspoon of powder 2 times a day. 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

## 2023-01-17 NOTE — PROGRESS NOTES
Subjective:       Patient ID: Rosa Maria Kay is a 47 y.o. female.    Chief Complaint:  Follow-up    History of Present Illness  Medication Refill  Pertinent negatives include no abdominal pain, chills, coughing, diaphoresis, fatigue, fever, nausea, rash or vomiting.   Follow-up  Pertinent negatives include no abdominal pain, chills, coughing, diaphoresis, fatigue, fever, nausea, rash or vomiting.  46 Y O F P2   has a past medical history of Abnormal Pap smear of cervix (2017), Encounter for Essure implantation (2017), HPV in female (2017), Interstitial cystitis, and Puberty, precocious (1984). initially Diagnosed in 2007 by  Dr. Pena who the patient continued with until she retired.  Has seen Dr. Flood since 2016 with management of medications  which has worked: Elmiron three times a day,  flovoxate once a day, uribel daily. She also takes opinorol for restless leg syndrome.     Taking Urogesic blue c at 7:30 pm   Uro MP- 5:30 pm   Hydroxyzine 10 mg     Tried : Elavil not helpful reactions severe GI discomfort   Vaginal suppositories 2012 unclear of exact compounding      Has not tried:   Gabapentin  Bladder instillation   hydrodistention     Symptoms are mostly when she lays down when her body is relax, no burning with urination, feels a sense of relief after she voids.   Interval  HP since the last visit: last seen one week ago.   Feeling much better.  IC: pain: much improved, urinary urgency and night time urination also improved  With Hydroxyzine: Night time urination now 1-2 night.   Has been doing the 1/4 valium 10 mg tab in vagina also.       1/17/2023  Patient with bothersome pelvic pressure  Discontinued the veiscare due to side effects   Pelvic floor PT has been helpful   Using uribel and hydroxyzine which are helpful       [facility]     Question 4/9/2020  9:39 AM CDT - Filed by Patient on 4/9/2020   General Urogynecology: Are you experiencing the following?    Dysuria (painful urination)  "No   Nocturia:  waking up at night to empty your bladder  Yes   If you answered yes to the previous question, how many times does this happen per night? 3-4   Enuresis (urine loss during sleep) No   Dribbling urine after you urinate No   Hematuria (urine appears red) No   Type of stream Weak   Urinary frequency: How often a day are you going to the bathroom per day?  More than 15   Urinary Tract Infections: How many Urinary Tract Infections have you had in the past year? I have not had a UTI in the past year   If you have had a UTI in the past year, what treatments have you had so far?  I have not had a UTI in the past year   Urinary Incontinence (General): Are you experiencing the following?    Past consultation for incontinence: Have you ever seen someone for the evaluation of incontinence? No   If you answered yes to the previous question, please select all the therapies you have tried.  None of the above   Please note the effectiveness of the therapies. Ineffective   Need to wear protection to keep clothes dry  No   If you answered yes to the previous question, please giselle the protection you use.  None   If you wear protection, how much wetness is typically on each pad? N/a- I do not wear protection   If you wear protection, how often do you have to change per day, if applicable?  0   Stress Symptoms: Are you experiencing the following?    Leakage of urine with cough, laugh and/or sneeze No   If you answered yes to the previous question, what is the frequency in days, weeks and/or months? Never   Leakage of urine with sex No   Leakage of urine with bending/ lifting No   Leakage of urine with briskly walking or jogging No   If you lose urine for any other reason not previously mentioned, please note it below, if applicable.     Urge Symptoms: Are you experiencing the following?    Urgency ("got to go" feeling) Yes   Urge: How frequently do you feel an urge to urinate (feeling like you "gotta go" to the bathroom " and can't wait) Never   Do you experience a leakage of urine when you have a feeling of urgency?  No   Leakage of urine when unaware No   Past use of anticholinergics (medications used to treat overactive bladder) No   If you answered yes to the previous question, please giselle the anticholinergics you have used:     Have you ever used Mirbetriq (aka Mirabegron)?  No   Prolapse Symptoms: Are you experiencing any of the following?     Falling out/ Bulging/ Heaviness in the vagina No   Vaginal/ Abdominal Pain/ Pressure No   Need to strain/ Push to void No   Need to wait on the toilet before you void No   Unusual position to urinate (using your hands to push back the vaginal bulge) No   Sensation of incomplete emptying No   Past use of pessary device No   If you answered yes to the previous question, please list the devices you have used below.     Bowel Symptoms: Are you experiencing any of the following?    Constipation No   Diarrhea  No   Hematochezia (bloody stool) No   Incomplete evacuation of stool No   Involuntary loss of formed stool No   Fecal smearing/urgency No   Involuntary loss of gas No   Vaginal Symptoms: Are you experiencing any of the following?     Abnormal vaginal bleeding  No   Vaginal dryness No   Sexually active  Yes   Dyspareunia (painful intercourse) No   Estrogen use       GYN & OB History  No LMP recorded. Patient is premenopausal.   Date of Last Pap: No result found    OB History    Para Term  AB Living   2 2 1 1   2   SAB IAB Ectopic Multiple Live Births           2      # Outcome Date GA Lbr Syed/2nd Weight Sex Delivery Anes PTL Lv   2 Term  40w0d  3.43 kg (7 lb 9 oz) M    VANESA   1   34w0d  3.005 kg (6 lb 10 oz) M CS-LTranv   VANESA     OB  Largest: c/s ;   Forceps: no  Episiotomy:      GYN  Menarche: 14  Menstrual cycle: /   Menopause: no  Hysterectomy: no  Ovaries: prersent    Past Medical History:   Diagnosis Date    Abnormal Pap smear of cervix      CKC done (Dr Cote @ [facility]) - normla paps after    Encounter for Essure implantation     HPV in female 2017    Interstitial cystitis     Dr. Wheeler    Puberty, precocious        Past Surgical History:   Procedure Laterality Date     SECTION      COLD KNIFE CONIZATION OF CERVIX      w/ ESSURE     COLONOSCOPY  2020    NORMAL. Repat 3 yrs --  DR Jessica Vogt     COLONOSCOPY N/A 2020    Procedure: COLONOSCOPY;  Surgeon: Jessica Vogt MD;  Location: [facility];  Service: Endoscopy;  Laterality: N/A;  CV-19 ORDERED    VAGINAL BIRTH AFTER  SECTION         Current Outpatient Medications:     DAILY MULTI-VITAMIN ORAL, Take by mouth., Disp: , Rfl:     meloxicam (MOBIC) 15 MG tablet, Take 1 tablet (15 mg total) by mouth once daily., Disp: 30 tablet, Rfl: 0    methen-sod phos-meth blue-hyos (UROGESIC-BLUE/URYL) 81.6-40.8-0.12 mg Tab, TAKE ONE TABLET BY MOUTH EVERY 6 HOURS AS NEEDED (MAXIMUM OF 5 DAYS) FOR BLADDER PAIN, Disp: 60 tablet, Rfl: 0    progesterone (PROMETRIUM) 100 MG capsule, Take 1 capsule by mouth 30-60 minutes before bed every night, Disp: 90 capsule, Rfl: 3    rOPINIRole (REQUIP) 1 MG tablet, TAKE ONE TABLET BY MOUTH TWICE DAILY, Disp: 60 tablet, Rfl: 11    TAZORAC 0.05 % Crea cream, Apply 1 application topically nightly., Disp: , Rfl:     conjugated estrogens (PREMARIN) vaginal cream, 1 g with applicator or dime-sized amt with finger in vagina nightly x 2 weeks, then twice a week thereafter, Disp: 45 g, Rfl: 12    diclofenac sodium (VOLTAREN) 1 % Gel, Upper extremities: Apply the gel (2 g) to the affected area 4 times daily. Do not apply more than 8 g daily to any one affected joint of the upper extremities., Disp: 100 g, Rfl: 5    spironolactone (ALDACTONE) 50 MG tablet, Take 1 tablet (50 mg total) by mouth once daily., Disp: 30 tablet, Rfl: 11    vibegron 75 mg Tab, Take 75 mg by mouth once daily., Disp: 30 tablet, Rfl: 11      Review  of Systems  Review of Systems   Constitutional: Negative.  Negative for activity change, appetite change, chills, diaphoresis, fatigue, fever and unexpected weight change.   HENT: Negative.     Eyes: Negative.    Respiratory: Negative.  Negative for cough.    Cardiovascular: Negative.    Gastrointestinal:  Negative for abdominal distention, abdominal pain, anal bleeding, blood in stool, constipation, diarrhea, nausea, rectal pain and vomiting.   Endocrine: Negative.    Genitourinary:  Positive for dysuria and urgency. Negative for decreased urine volume, difficulty urinating, dyspareunia, enuresis, flank pain, frequency, genital sores, hematuria, menstrual problem, pelvic pain, vaginal bleeding, vaginal discharge and vaginal pain.        Night time urination    Musculoskeletal:  Negative for back pain.   Skin:  Negative for color change, pallor, rash and wound.   Allergic/Immunologic: Negative for environmental allergies, food allergies and immunocompromised state.   Hematological:  Negative for adenopathy. Does not bruise/bleed easily.   Psychiatric/Behavioral:  Negative for agitation, behavioral problems, confusion and sleep disturbance.          Objective:     Physical Exam   Constitutional: She is oriented to person, place, and time. She appears well-developed and well-nourished.   HENT:   Head: Normocephalic and atraumatic.   Eyes: Conjunctivae and EOM are normal.   Cardiovascular: Normal rate, regular rhythm, S1 normal, S2 normal, normal heart sounds and intact distal pulses.   Pulmonary/Chest: Effort normal and breath sounds normal. No respiratory distress. She has no wheezes. She exhibits no tenderness.   Abdominal: Soft. Bowel sounds are normal. She exhibits no distension and no mass. There is no splenomegaly or hepatomegaly. There is no abdominal tenderness. There is no rigidity, no rebound and no guarding. No hernia.   Genitourinary: Pelvic exam was performed with patient supine. Rectum normal, vagina  normal, cervix normal, skenes normal and bartholins normal. Right labia normal and left labia normal. Urethra exhibits no urethral caruncle, no urethral diverticulum and no urethral mass. Right bartholin is not enlarged and not tender. Left bartholin is not enlarged and not tender. Rectal exam shows resting tone normal and active tone normal. Rectal exam shows no external hemorrhoid, no fissure, no tenderness, anal tone normal and no dovetailing. Guaiac negative stool. There is lavator tenderness in the vagina. No foreign body, tenderness, bleeding, unspecified prolapse of vaginal walls, fistula or mesh exposure in the vagina. Right adnexum displays no mass and no tenderness. Left adnexum displays no mass and no tenderness. Cervix exhibits no motion tenderness and no discharge. Uterus is tender. Uterus is not experiencing uterine prolapse.   PVR: 10 ML  Empty cough stress test: Negative.  Kegel: 1/5    POP-Q  Aa: -1 Ba: -1 C: -5   GH: 3 PB: 2 TVL: 8   Ap: -2 Bp: -2 D: -6                      Genitourinary Comments: No bladder base tenderness  Levators not tender      Musculoskeletal:         General: Normal range of motion.      Cervical back: Normal range of motion and neck supple.   Neurological: She is alert and oriented to person, place, and time. She has normal strength and normal reflexes. Cranial nerves II through XII intact. No cranial nerve deficit.   Skin: Skin is warm and dry.   Psychiatric: She has a normal mood and affect. Her speech is normal and behavior is normal. Judgment and thought content normal.        Assessment:        1. Pelvic pressure in female    2. IC (interstitial cystitis)    3. Urinary urgency                 Plan:         IC  --Prelief to help alkinize the urine:   --Prelief Tablets : Each tablet contains 345 mg calcium glycerophosphate (65 mg of elemental calcium). The tablets also contain 0.25% magnesium  stearate as a processing aid. Two tablets are equivalent to 690 mg calcium  glycerophosphate (130mg of elemental calcium).   --Prelief Powder : Each ¼ teaspoon usage of powder is comparable to two tablets. The powder dissolves rapidly in food or non-alcoholic beverages.  Tablets are recommended for taking with alcoholic beverages   --Usage:     --Tablets: 2-3 tablets, 2 times a day.    --Powder: ¼ teaspoon of powder 2 times a day. 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     Yeny Silva DO  Female Pelvic Medicine and Reconstructive Surgery  [facility] [place], [place]

## 2023-01-18 RX ORDER — TROSPIUM CHLORIDE 20 MG/1
20 TABLET, FILM COATED ORAL 2 TIMES DAILY
Qty: 60 TABLET | Refills: 11 | Status: SHIPPED | OUTPATIENT
Start: 2023-01-18 | End: 2023-06-09

## 2023-01-20 ENCOUNTER — CLINICAL SUPPORT (OUTPATIENT)
Dept: REHABILITATION | Facility: OTHER | Age: 48
End: 2023-01-20
Attending: OBSTETRICS & GYNECOLOGY
Payer: COMMERCIAL

## 2023-01-20 DIAGNOSIS — N30.10 IC (INTERSTITIAL CYSTITIS): ICD-10-CM

## 2023-01-20 DIAGNOSIS — M79.18 MYALGIA OF PELVIC FLOOR: ICD-10-CM

## 2023-01-20 DIAGNOSIS — R39.15 URINARY URGENCY: ICD-10-CM

## 2023-01-20 PROBLEM — M79.10 MUSCLE TENDERNESS: Status: RESOLVED | Noted: 2021-05-25 | Resolved: 2023-01-20

## 2023-01-20 PROBLEM — R27.9 LACK OF COORDINATION: Status: RESOLVED | Noted: 2021-05-25 | Resolved: 2023-01-20

## 2023-01-20 PROCEDURE — 97530 THERAPEUTIC ACTIVITIES: CPT

## 2023-01-20 PROCEDURE — 97161 PT EVAL LOW COMPLEX 20 MIN: CPT

## 2023-01-20 PROCEDURE — 97112 NEUROMUSCULAR REEDUCATION: CPT

## 2023-01-20 NOTE — PLAN OF CARE
"[facility]  Pelvic Health Physical Therapy Initial Evaluation    Date: 2023   Name: Rosa Maria [facility]  Clinic Number: [id number]  Therapy Diagnosis: No diagnosis found.  Physician: Yeny Silva,*    Physician Orders: PT Eval and Treat  Medical Diagnosis from Referral: interstitial cystitis  Evaluation Date: 2023  Authorization Period Expiration: 1 visit  Plan of Care Expiration: 23  Visit # / Visits authorized:     Time In: 1100  Time Out: 1200  Total Appointment Time (timed & untimed codes): 60 minutes    Precautions: universal    Subjective     Date of onset: diagnosed with IC in     History of current condition - [place] reports: After course of PFPT last year, was a "game changer". Able to reduce meds to as needed. Everything was fine for awhile. Then in December began having increased Saw Dr. Silva who thought she could use a PFPT refresher. After intercourse has constant urge to go to the bathroom for several days. Just started vaginal estrogen last night. Discomfort after sex did not matter with positions or lubricnats. Was decreased I fused valium supppsitory after sex bu tthis was messya dn she didn't want to use every time.     OB/GYN History: , vaginal delivery, and caesarean -  - eclamptic seizures/section and    Surgical History:   Birth Control: essure implant   History of chronic yeast, BV or UTIs? Yes - yeast infections in middle school, had periods where thought UTI but maybe were actually IC  Sexually active? No  Pain with vaginal exams, intercourse or tampon use? No - sex itself not painful, it was the after effects    Bladder/Bowel History:   Frequency of urination:   Daytime: 1-1.5, max 2 hours           Nighttime: 1-2 (mostly 1x)  Difficulty initiating urine stream: No  Urine stream: strong  Complete emptying: Yes  Push to empty bladder: Yes  Sit to urinate in public restrooms: No - squats  Bladder leakage: " Yes  Frequency of incidents/Type of incontinence: UUI was occurring in December  Amount leaked (urine): small squirt   Urinary Urgency: No - can delay 10-15 minutes from onset of urge  Frequency of bowel movements: once a day  Difficulty initiating BM: Yes - sometimes  Quality/Shape of BM: Wetzel Stool Chart 4 can be 6  Does Patient Feel Empty after BM? No  Fiber Supplements or Laxative Use? Yes - Happy fiber supplement (soluble fiber)  Colon leakage: No  Frequency of incidents: none   Fecal Urgency: No  Form of protection: pad  Number of pads required in 24 hours: not for leakage  History of coccyx injury: Yes - 2016, fall with extreme painful afterwards    Prolapse Screening:  Feeling of vaginal bulge: No    Pain:  Location: none     Medical History: Rosa Maria  has a past medical history of Abnormal Pap smear of cervix (), Encounter for Essure implantation (), HPV in female (), Interstitial cystitis, and Puberty, precocious ().     Surgical History: Rosa Maria Kay  has a past surgical history that includes Colonoscopy (2020); Colonoscopy (N/A, 2020); Cold knife conization of cervix ();  section (); and Vaginal birth after  section ().    Medications: Rosa Maria has a current medication list which includes the following prescription(s): premarin, multivitamin, diclofenac sodium, meloxicam, methen-sod phos-meth blue-hyos, progesterone, ropinirole, spironolactone, tazorac, trospium, and vibegron.    Allergies: Review of patient's allergies indicates:  No Known Allergies       Prior Therapy/Previous treatment included: PFPT in   Social History:  lives with their spouse  Current exercise: walking, trying to return to running, barre, weightlifting  Occupation: Pt works as a  and job-related duties include active.  Prior Level of Function: normalized urinary symptoms  Current Level of Function: increased urinary frequency    Types of fluid intake:  water - 64 oz, ramya tea, coffee - 2-3 cups  Diet: TBA     Abuse/Neglect: No    Pts goals: to get a refresher and make sure she is doing her exercises correctly and to minimize irritation  To be able to return to intercourse without discomfort after    OBJECTIVE     See EMR under MEDIA for written consent provided 1/20/2023  Chaperone: Declined    ORTHO SCREEN  Posture in sitting: WNL  Posture in standing: posterior pelvic tilt   Pelvic alignment: no sign of deviations noted in supine   SLS: good stability  Lumbar ROM: WNL ROM and painfree  Pubic symphysis: not tested    ABDOMINAL WALL ASSESSMENT  Palpation: WNL  Abdominal strength: dysfx activation pattern  Scarring: midline incision, restrictions and reproduction bladder urge distal aspect  Pelvic Floor Muscle and Transverse Abdominus Synergy: absent  Diastasis: absent      BREATHING MECHANICS ASSESSMENT   Thorax Assessment During Quiet Respiration: Decreased excursion of abdominal wall  and Decreased excursion bilaterally of lateral ribs   Thorax Assessment During Deep Respiration: Decreased excursion of abdominal wall  and Decreased excursion bilaterally of lateral ribs     VAGINAL PELVIC FLOOR EXAM    EXTERNAL ASSESSMENT  Introitus: gaping  Skin condition: WNL  Scarring: none   Sensation: WNL   Pain: none  Voluntary contraction: visible lift  Voluntary relaxation: visible drop  Involuntary contraction: bulge  Bearing down: visible drop  Perineal descent: absent  Comments: na      INTERNAL ASSESSMENT  Pain: tender areas noted as follows: throughout with deep palpation   Sensation: able to localized pressure appropriately   Vaginal vault: WNL   Muscle Bulk: hypertonus   Muscle Power: 3-/5  Muscle Endurance: 10 sec  # Reps To Fatigue: 1    Fast Contractions in 10 seconds: 5     Quality of contraction: incomplete relaxation and poor coordination   Specificity: WNL   Coordination: WNL   Prolapse check: none  Comments: na    Limitation/Restriction for FOTO Urinary  Survey    Therapist reviewed FOTO scores for Rosa Maria Kay on 2023.   FOTO documents entered into [employer] - see Media section.    Limitation Score: check FOTO%       TREATMENT     Treatment Time In: 1135  Treatment Time Out: 1200  Total Treatment time (time-based codes) separate from Evaluation: 25 minutes    Neuromuscular Re-education to develop Coordination, Control, and Proprioception for 10 minutes includin breathing   Pelvic tilts - focus on isolation of pelvic motion from ribs    Manual Therapy  to develop flexibility for 5 minutes, scar tissue mobilization abdominal scar with external techniques and bi-manual    Therapeutic Activity Patient participated in dynamic functional therapeutic activities to improve functional performance for 10 minutes. Including: Education as described below.   Education on A&P of pelvic floor function  Patient Education provided:   general anatomy/physiology of urinary/ bowel  system and benefits of treatment were discussed with the pt. Additionally, bladder irritants, anatomy/physiology of pelvic floor, and diaphragmatic breathing were reviewed.     Home Exercises provided:  Written Home Exercises provided: Yes  Exercises were reviewed and Rosa Maria was able to demonstrate them prior to the end of the session.    Rosa Maria demonstrated good  understanding of the education provided.     See EMR under Patient Instructions for exercises provided 2023.    Assessment     Rosa Maria is a 47 y.o. female referred to outpatient Physical Therapy with a medical diagnosis of interstitial cystitis. Pt presents with altered posture, poor trunk stability, pelvic floor tenderness, increased tension of the pelvic muscles, poor quality of pelvic muscle contraction, increased frequency of urination, dysfunctional voiding, and unable to co-contract or co-relax abdominal wall and pelvic floor muscles. Pelvic exam reveals greater tension and TTP throughout mm with deeper palpation. Sensitivity  of tissues around urethra noted as well as distal scar tissue restrictions around old  scar. Postural abnormalities noted with PPT and decreased lordosis, restricted breathing patterns, altered abdominal mm activation patterns and poor coordination of pelvic floor mm contraction noted. All will be addressed in therapy as well as core and hip strengthening.       Pt prognosis is Excellent  Pt will benefit from skilled outpatient Physical Therapy to address the deficits stated above and in the chart below, provide pt/family education, and to maximize pt's level of independence.     Plan of care discussed with patient: Yes  Pt's spiritual, cultural and educational needs considered and patient is agreeable to the plan of care and goals as stated below:     Anticipated Barriers for therapy: None    Medical Necessity is demonstrated by the following:    History  Co-morbidities and personal factors that may impact the plan of care Co-morbidities   interstitial cystitis- hx of diagnosis of    Personal Factors  no deficits     low   Examination  Body structures and functions, activity limitations and participation restrictions that may impact the plan of care Body Regions/Systems/Functions:  altered posture, poor trunk stability, pelvic floor tenderness, increased tension of the pelvic muscles, poor quality of pelvic muscle contraction, increased frequency of urination, dysfunctional voiding, and unable to co-contract or co-relax abdominal wall and pelvic floor muscles.    Activity Limitations:  delaying urge to urinate, pain with full bladder affecting ADL participation and/or sleep, and intercourse/vaginal exam/tampon use without pain    Participation Restrictions:  all ADLs/iADLs uninterrupted by urinary incontinence/urgency/frequency and relationship with spouse/partner    Activity limitations:   Learning and applying knowledge  No deficits    General Tasks and Commands  No deficits    Communication  No  deficits    Mobility  No deficits    Self care  No deficits    Domestic Life  No deficits    Interactions/Relationships  No deficits    Life Areas  No deficits    Community and Social Life  No deficits       high   Clinical Presentation evolving clinical presentation with changing clinical characteristics moderate   Decision Making/ Complexity Score: low       Goals:  Short Term Goals: 4 weeks   Pt indep in HEP  Pt able to wait at least 2 hours between trips to the bathroom for improved participation in ADLs  Pt demo pelvic floor mm strength at least 3+/5 for improved support of pelvic organs  Pt indep in pelvic floor relaxation strategies  Pt indep in use of pelvic wand  Pt indep in urge suppression techniques    Long Term Goals: 8 weeks   Pt indep in progressive HEP  Pt reports 0 episodes of leakage in at least 2 weeks for improved ADL participation  Pt able to wait at least 2.5-4 hours between trips to the bathroom   Nocturia no more than 1x/night for improved quality of sleep  Pt reports able to have painfree intercourse with no increased discomfort afterwards for improved participation in ADLs and improved intimacy with partner   Pt demo pelvic floor mm strength at least 4/5 for improved support of pelvic organs    Plan     Plan of care Certification: 1/20/2023 to 4/18/23.    Outpatient Physical Therapy 1 times weekly for 12 weeks to include the following interventions: therapeutic exercises, therapeutic activity, neuromuscular re-education, manual therapy, patient/family education and self care/home management    Next visit: manual, review/progress ursula ROMERO?    Missy Lynn, PT

## 2023-01-20 NOTE — PATIENT INSTRUCTIONS
Home Program 1/20/23:    1)     360 Breath - Inhale long, slow and deep. You should feel as if your lower ribs are expanding out to the sides. Your belly, back and sides should also gently expand and you may notice a relaxation in the pelvic floor.     Continue to breath like this for 10-15 breaths. Repeat 3 times/day. Spread throughout the day.    2) Pelvic tilts Pelvic tilts - lay on back with knees bent. Draw low ribs down into table. Isolating motion at pelvic roll hips up towards you, then away from you. Do 2 sets of 10.

## 2023-02-02 ENCOUNTER — PATIENT MESSAGE (OUTPATIENT)
Dept: FAMILY MEDICINE | Facility: CLINIC | Age: 48
End: 2023-02-02
Payer: COMMERCIAL

## 2023-02-02 ENCOUNTER — LAB VISIT (OUTPATIENT)
Dept: LAB | Facility: HOSPITAL | Age: 48
End: 2023-02-02
Attending: FAMILY MEDICINE
Payer: COMMERCIAL

## 2023-02-02 DIAGNOSIS — R63.5 WEIGHT GAIN: ICD-10-CM

## 2023-02-02 DIAGNOSIS — R63.5 WEIGHT GAIN: Primary | ICD-10-CM

## 2023-02-02 LAB — TSH SERPL DL<=0.005 MIU/L-ACNC: 1.05 UIU/ML (ref 0.4–4)

## 2023-02-02 PROCEDURE — 36415 COLL VENOUS BLD VENIPUNCTURE: CPT | Performed by: FAMILY MEDICINE

## 2023-02-02 PROCEDURE — 84443 ASSAY THYROID STIM HORMONE: CPT | Performed by: FAMILY MEDICINE

## 2023-02-03 NOTE — PROGRESS NOTES
"  Pelvic Health Physical Therapy   Treatment Note     Name: Rosa Maria Long [facility]  Clinic Number: [id number]    Therapy Diagnosis:   Encounter Diagnosis   Name Primary?    Myalgia of pelvic floor Yes     Physician: Yeny Silva,*    Visit Date: 2023    Physician Orders: PT Eval and Treat  Medical Diagnosis from Referral: interstitial cystitis  Evaluation Date: 2023  Authorization Period Expiration: 1 visit  Plan of Care Expiration: 23  Visit # / Visits authorized:      Time In: 3:00pm  Time Out: 3:55pm  Total Appointment Time (timed & untimed codes): 55 minutes     Precautions: universal  Subjective     Pronouns:    Pt reports: Urgency has improved, but not sure if it's due to new medication. Can wait up to 3 hours now. Still pushing because she doesn't feel empty. Still feeling a pressure after BM like she isn't completely empty.    She was compliant with home exercise program.  Response to previous treatment: No adverse effects   Functional change: Decreased urgency    Pain: 0/10  Location:     Pts goals: to get a refresher and make sure she is doing her exercises correctly and to minimize irritation  To be able to return to intercourse without discomfort after    Objective   Pt verbally consents to intravaginal treatment today.  Signed consent form already on file.    Rosa Maria received therapeutic exercises to develop  for 10 minutes including:   Pelvic tilts  +Supine hip flexor stretch + DM 1 min  +Open book x10,3"  +Demo of kneeling hip flexor stretch for work      Rosa Maria received the following manual therapy techniques:  for 35 minutes including:   scar tissue mobilization abdominal scar with external techniques and bi-manual  Bimanual STM to periurethral and endopelvic fascia tissues   STM B iliacus  STM abdominal region over bladder    Rosa Maria participated in neuromuscular re-education activities to develop for 5 minutes includin breathing   Pelvic tilts - focus on isolation of pelvic " motion from ribs    Rosa Maria participated in dynamic functional therapeutic activities to improve functional performance for  10 minutes, including:  Breathing mechanics with BM  Urge suppression-roll for control  Discussion of not squatting at public restrooms    Home Exercises Provided and Patient Education Provided     Education provided:   - bladder retraining and diaphragmatic breathing  Discussed progression of plan of care with patient; educated pt in activity modification; reviewed HEP with pt. Pt demonstrated and verbalized understanding of all instruction and was provided with a handout of HEP (see Patient Instructions).    Written Home Exercises Provided: yes.  Exercises were reviewed and Rosa Maria was able to demonstrate them prior to the end of the session.  Rosa Maria demonstrated good  understanding of the education provided.     See EMR under Patient Instructions for exercises provided     Assessment   Rosa Maria returns to PT for second PF PT appointment with improved urgency symptoms that could be from newly prescribed medication. Discussed the importance of proper breathing to decrease IAP with BM/urinating. B hip flexors restricted R>L. Demo'd good pelvic mobility following stretches.       Rosa Maria is progressing well towards her goals.   Pt prognosis is Excellent.     Pt will continue to benefit from skilled outpatient physical therapy to address the deficits listed in the problem list box on initial evaluation, provide pt/family education and to maximize pt's level of independence in the home and community environment.     Pt's spiritual, cultural and educational needs considered and pt agreeable to plan of care and goals.     Anticipated barriers to physical therapy: None    Goals: Short Term Goals: 4 weeks   Pt indep in HEP  Pt able to wait at least 2 hours between trips to the bathroom for improved participation in ADLs  Pt demo pelvic floor mm strength at least 3+/5 for improved support of pelvic organs  Pt indep  in pelvic floor relaxation strategies  Pt indep in use of pelvic wand  Pt indep in urge suppression techniques     Long Term Goals: 8 weeks   Pt indep in progressive HEP  Pt reports 0 episodes of leakage in at least 2 weeks for improved ADL participation  Pt able to wait at least 2.5-4 hours between trips to the bathroom   Nocturia no more than 1x/night for improved quality of sleep  Pt reports able to have painfree intercourse with no increased discomfort afterwards for improved participation in ADLs and improved intimacy with partner   Pt demo pelvic floor mm strength at least 4/5 for improved support of pelvic organs  Plan   Plan of care Certification: 1/20/2023 to 4/18/23.     Outpatient Physical Therapy 1 times weekly for 12 weeks to include the following interventions: therapeutic exercises, therapeutic activity, neuromuscular re-education, manual therapy, patient/family education and self care/home management     Next visit: manual, review/progress PFME, wand, double voiding? Review BM breathing/positioning        Ana Shah, PTA

## 2023-02-06 ENCOUNTER — CLINICAL SUPPORT (OUTPATIENT)
Dept: REHABILITATION | Facility: OTHER | Age: 48
End: 2023-02-06
Attending: OBSTETRICS & GYNECOLOGY
Payer: COMMERCIAL

## 2023-02-06 DIAGNOSIS — M79.18 MYALGIA OF PELVIC FLOOR: Primary | ICD-10-CM

## 2023-02-06 PROCEDURE — 97140 MANUAL THERAPY 1/> REGIONS: CPT | Mod: CQ

## 2023-02-06 PROCEDURE — 97110 THERAPEUTIC EXERCISES: CPT | Mod: CQ

## 2023-02-06 PROCEDURE — 97530 THERAPEUTIC ACTIVITIES: CPT | Mod: CQ

## 2023-02-06 NOTE — PATIENT INSTRUCTIONS
"    Hold 1 min, repeat 1 time per day    "Roll for Control"  Strategies to Delay Voiding    What to do when you feel like you "gotta go gotta go!"     Stop what you are doing.    Take a few good deep breaths (this settles down your nervous system and relaxes your bladder).    WHILE YOU CONTINUE DEEP BREATHING, activate your pelvic floor by performing several quick contractions and releases.  (Pelvic floor contractions send a message to the bladder to relax and hold urine.)  Sitting: Roll thigh in and out slowly or squeeze knees together  Standing: Roll thigh in/out slowly and gently    As you do the above, you can also count backwards from 100 (to help get your mind off the urge!).       After the urge to void has quietly, you may be able to process with your activity OR if it has been approximately 3 hours since you've voided, you may choose to go to the bathroom and void.        Remember......"Control your bladder before it controls YOU!"  "

## 2023-02-13 ENCOUNTER — CLINICAL SUPPORT (OUTPATIENT)
Dept: REHABILITATION | Facility: OTHER | Age: 48
End: 2023-02-13
Attending: OBSTETRICS & GYNECOLOGY
Payer: COMMERCIAL

## 2023-02-13 DIAGNOSIS — M79.18 MYALGIA OF PELVIC FLOOR: Primary | ICD-10-CM

## 2023-02-13 PROCEDURE — 97140 MANUAL THERAPY 1/> REGIONS: CPT | Mod: CQ

## 2023-02-13 PROCEDURE — 97530 THERAPEUTIC ACTIVITIES: CPT | Mod: CQ

## 2023-02-13 NOTE — PROGRESS NOTES
"  Pelvic Health Physical Therapy   Treatment Note     Name: Rosa Maria [facility]  Clinic Number: [id number]    Therapy Diagnosis:   Encounter Diagnosis   Name Primary?    Myalgia of pelvic floor Yes       Physician: Yeny Silva,*    Visit Date: 2/13/2023    Physician Orders: PT Eval and Treat  Medical Diagnosis from Referral: interstitial cystitis  Evaluation Date: 1/20/2023  Authorization Period Expiration: 1 visit  Plan of Care Expiration: 4/18/23  Visit # / Visits authorized: 3/12     Time In: 2:05pm  Time Out: 3:05pm  Total Appointment Time (timed & untimed codes): 55 minutes     Precautions: universal  Subjective   Pt reports: Friday through Saturday night experienced a flare-up of symptoms-couldn't wait more than hour before trips to the bathroom, felt like everything she drank was going through her. Tried roll for control, but nothing was helping urgency. Continues to not feeling empty after BM.      She was compliant with home exercise program.  Response to previous treatment: No adverse effects   Functional change: Decreased urgency    Pain: 0/10  Location:     Pts goals: to get a refresher and make sure she is doing her exercises correctly and to minimize irritation  To be able to return to intercourse without discomfort after    Objective   Pt verbally consents to intravaginal treatment today.  Signed consent form already on file.    Rosa Maria received therapeutic exercises to develop  for 10 minutes including:     Pelvic tilts  Supine hip flexor stretch + DM 1 min  Open book x10,3"  +Cat cow x15  +Piriformis stretch x30"        Rosa Maria received the following manual therapy techniques:  for 45 minutes including:   scar tissue mobilization abdominal scar with external techniques and bi-manual  Bimanual STM to periurethral and endopelvic fascia tissues   STM B iliacus  STM abdominal region over bladder   CR R hip flexor 3x5"    Rosa Maria participated in neuromuscular re-education activities to develop for 5 " minutes includin breathing   Pelvic tilts - focus on isolation of pelvic motion from ribs    Rosa Maria participated in dynamic functional therapeutic activities to improve functional performance for  5 minutes, including:  Breathing mechanics with BM  Urge suppression-roll for control  Discussion of not squatting at public restrooms  Double voiding techniques    Home Exercises Provided and Patient Education Provided     Education provided:   - bladder retraining and diaphragmatic breathing  Discussed progression of plan of care with patient; educated pt in activity modification; reviewed HEP with pt. Pt demonstrated and verbalized understanding of all instruction and was provided with a handout of HEP (see Patient Instructions).    Written Home Exercises Provided: yes.  Exercises were reviewed and Rosa Maria was able to demonstrate them prior to the end of the session.  Rosa Maria demonstrated good  understanding of the education provided.     See EMR under Patient Instructions for exercises provided     Assessment   Reviewed bowel movement mechanics and breathing techniques as well as dietary factors that might be contributing to not feeling fully empty after BM. Educated on double voiding to prevent pushing while peeing, will monitor NV. Patient states she is taking a food intolerance test this week, follow up to determine if dietary changes impact bowel function. Patient states that she felt a similar pain that she experienced during internal manual work today, explained the importance of adequate lube and the use pf pelvic wand to release trigger points and adhesions of fascia. Provide samples of lube NV as patient reports not being able to tolerate most lubes. Cat cow revealed restriction in thoracic spine, will continue to progress mobility exercises to address.       Rosa Maria is progressing well towards her goals.   Pt prognosis is Excellent.     Pt will continue to benefit from skilled outpatient physical therapy to  address the deficits listed in the problem list box on initial evaluation, provide pt/family education and to maximize pt's level of independence in the home and community environment.     Pt's spiritual, cultural and educational needs considered and pt agreeable to plan of care and goals.     Anticipated barriers to physical therapy: None    Goals: Short Term Goals: 4 weeks   Pt indep in HEP  Pt able to wait at least 2 hours between trips to the bathroom for improved participation in ADLs  Pt demo pelvic floor mm strength at least 3+/5 for improved support of pelvic organs  Pt indep in pelvic floor relaxation strategies  Pt indep in use of pelvic wand  Pt indep in urge suppression techniques     Long Term Goals: 8 weeks   Pt indep in progressive HEP  Pt reports 0 episodes of leakage in at least 2 weeks for improved ADL participation  Pt able to wait at least 2.5-4 hours between trips to the bathroom   Nocturia no more than 1x/night for improved quality of sleep  Pt reports able to have painfree intercourse with no increased discomfort afterwards for improved participation in ADLs and improved intimacy with partner   Pt demo pelvic floor mm strength at least 4/5 for improved support of pelvic organs  Plan   Plan of care Certification: 1/20/2023 to 4/18/23.     Outpatient Physical Therapy 1 times weekly for 12 weeks to include the following interventions: therapeutic exercises, therapeutic activity, neuromuscular re-education, manual therapy, patient/family education and self care/home management     Next visit: manual, check in about double voiding, BM?          Ana Shah, PTA

## 2023-02-15 ENCOUNTER — PATIENT MESSAGE (OUTPATIENT)
Dept: REHABILITATION | Facility: OTHER | Age: 48
End: 2023-02-15
Payer: COMMERCIAL

## 2023-02-20 ENCOUNTER — HOSPITAL ENCOUNTER (OUTPATIENT)
Dept: RADIOLOGY | Facility: HOSPITAL | Age: 48
Discharge: HOME | End: 2023-02-20
Attending: OBSTETRICS & GYNECOLOGY
Payer: COMMERCIAL

## 2023-02-20 ENCOUNTER — PATIENT MESSAGE (OUTPATIENT)
Dept: ORTHOPEDICS | Facility: CLINIC | Age: 48
End: 2023-02-20
Payer: COMMERCIAL

## 2023-02-20 DIAGNOSIS — R10.2 PELVIC PRESSURE IN FEMALE: ICD-10-CM

## 2023-02-20 PROCEDURE — 76830 US PELVIS COMP WITH TRANSVAG NON-OB (XPD): ICD-10-PCS | Mod: 26,,, | Performed by: RADIOLOGY

## 2023-02-20 PROCEDURE — 76830 TRANSVAGINAL US NON-OB: CPT | Mod: 26,,, | Performed by: RADIOLOGY

## 2023-02-20 PROCEDURE — 76856 US EXAM PELVIC COMPLETE: CPT | Mod: 26,,, | Performed by: RADIOLOGY

## 2023-02-20 PROCEDURE — 76856 US PELVIS COMP WITH TRANSVAG NON-OB (XPD): ICD-10-PCS | Mod: 26,,, | Performed by: RADIOLOGY

## 2023-02-20 PROCEDURE — 76856 US EXAM PELVIC COMPLETE: CPT | Mod: TC

## 2023-02-22 DIAGNOSIS — S76.302A LEFT HAMSTRING INJURY, INITIAL ENCOUNTER: Primary | ICD-10-CM

## 2023-02-23 ENCOUNTER — OFFICE VISIT (OUTPATIENT)
Dept: ORTHOPEDICS | Facility: CLINIC | Age: 48
End: 2023-02-23
Attending: ORTHOPAEDIC SURGERY
Payer: COMMERCIAL

## 2023-02-23 DIAGNOSIS — M54.16 LEFT LUMBAR RADICULOPATHY: Primary | ICD-10-CM

## 2023-02-23 PROCEDURE — 99999 PR PBB SHADOW E&M-EST. PATIENT-LVL III: ICD-10-PCS | Mod: PBBFAC,,, | Performed by: ORTHOPAEDIC SURGERY

## 2023-02-23 PROCEDURE — 99213 PR OFFICE/OUTPT VISIT, EST, LEVL III, 20-29 MIN: ICD-10-PCS | Mod: S$GLB,,, | Performed by: ORTHOPAEDIC SURGERY

## 2023-02-23 PROCEDURE — 1159F PR MEDICATION LIST DOCUMENTED IN MEDICAL RECORD: ICD-10-PCS | Mod: CPTII,S$GLB,, | Performed by: ORTHOPAEDIC SURGERY

## 2023-02-23 PROCEDURE — 99213 OFFICE O/P EST LOW 20 MIN: CPT | Mod: S$GLB,,, | Performed by: ORTHOPAEDIC SURGERY

## 2023-02-23 PROCEDURE — 1159F MED LIST DOCD IN RCRD: CPT | Mod: CPTII,S$GLB,, | Performed by: ORTHOPAEDIC SURGERY

## 2023-02-23 PROCEDURE — 99999 PR PBB SHADOW E&M-EST. PATIENT-LVL III: CPT | Mod: PBBFAC,,, | Performed by: ORTHOPAEDIC SURGERY

## 2023-02-23 PROCEDURE — 1160F PR REVIEW ALL MEDS BY PRESCRIBER/CLIN PHARMACIST DOCUMENTED: ICD-10-PCS | Mod: CPTII,S$GLB,, | Performed by: ORTHOPAEDIC SURGERY

## 2023-02-23 PROCEDURE — 1160F RVW MEDS BY RX/DR IN RCRD: CPT | Mod: CPTII,S$GLB,, | Performed by: ORTHOPAEDIC SURGERY

## 2023-02-23 RX ORDER — GABAPENTIN 300 MG/1
300 CAPSULE ORAL 3 TIMES DAILY
Qty: 90 CAPSULE | Refills: 0 | Status: SHIPPED | OUTPATIENT
Start: 2023-02-23 | End: 2023-03-20

## 2023-02-23 NOTE — PROGRESS NOTES
Assessment: 48 y.o. female with left leg pain - suspect lumbar radic    I explained my diagnostic impression and the reasoning behind it in detail, using layman's terms.     Plan:   -PT referral placed   - Pain management   - gabapentin   - RTC 8 weeks    All questions were answered in detail. The patient is in full agreement with the treatment plan and will proceed accordingly.    CC: R lateral epicondylitis    Initial visit (9/28/22): Rosa Maria Kay is a 48 y.o. female who presents today complaining of Pain of the Left Femur     Duration of symptoms:  about 3 months   Trauma or new activity: yes - pain started when she was moving   Pain is constant  Localizes pain to ECRB, radiates down forearm and it feels bruised. Does have occ numbness into the ring finger   Aggravating factors: gripping, lifting   Relieving factors: rest   Prior treatment:   OTC NSAIDS - PO and topical. Sometimes massage helps - feels this causes spasms of her finger   Pain does interfere with activities of daily living .    12/21/22  Pain is much better   Doing brace, stretches, NSAIDs   Able to workout     Now having hip pain and popping with running for about 2-3 weeks along with pain over HS insertion  No injury   Not better w 2 weeks of rest     2/23/23  Hamstring pain is not better   Worse with sitting, laying on her back and putting her legs up on an exercise ball   Now radiating past her knee to the foot   No numbness or tingling  Does have new paresthesias   Feels like there is a bugle or something at her hamstring insertion but does not feel anything with palpation. Especially has that sensation when she sits   Cannot sit for long periods to time - is not excruciatingly painful but she needs to move out of that position  Popping in her hip resolved    This is the extent of the patient's complaints at this time.     Hand dominance: Left     Occupation:Assistant principle       Review of patient's allergies indicates:  No Known  Allergies      Current Outpatient Medications:     conjugated estrogens (PREMARIN) vaginal cream, 1 g with applicator or dime-sized amt with finger in vagina nightly x 2 weeks, then twice a week thereafter, Disp: 45 g, Rfl: 12    DAILY MULTI-VITAMIN ORAL, Take by mouth., Disp: , Rfl:     meloxicam (MOBIC) 15 MG tablet, Take 1 tablet (15 mg total) by mouth once daily., Disp: 30 tablet, Rfl: 0    methen-sod phos-meth blue-hyos (UROGESIC-BLUE/URYL) 81.6-40.8-0.12 mg Tab, TAKE ONE TABLET BY MOUTH EVERY 6 HOURS AS NEEDED (MAXIMUM OF 5 DAYS) FOR BLADDER PAIN, Disp: 60 tablet, Rfl: 0    progesterone (PROMETRIUM) 100 MG capsule, Take 1 capsule by mouth 30-60 minutes before bed every night, Disp: 90 capsule, Rfl: 3    rOPINIRole (REQUIP) 1 MG tablet, TAKE ONE TABLET BY MOUTH TWICE DAILY, Disp: 60 tablet, Rfl: 11    TAZORAC 0.05 % Crea cream, Apply 1 application topically nightly., Disp: , Rfl:     diclofenac sodium (VOLTAREN) 1 % Gel, Upper extremities: Apply the gel (2 g) to the affected area 4 times daily. Do not apply more than 8 g daily to any one affected joint of the upper extremities., Disp: 100 g, Rfl: 5    spironolactone (ALDACTONE) 50 MG tablet, Take 1 tablet (50 mg total) by mouth once daily., Disp: 30 tablet, Rfl: 11    trospium (SANCTURA) 20 mg Tab tablet, Take 1 tablet (20 mg total) by mouth 2 (two) times daily., Disp: 60 tablet, Rfl: 11    vibegron 75 mg Tab, Take 75 mg by mouth once daily., Disp: 30 tablet, Rfl: 11    Physical Exam:   Vitals:    02/23/23 1135   PainSc:   5   PainLoc: Leg         General:Patient is alert, awake and oriented to time, place and person. Mood and affect are appropriate.  Patient does not appear to be in any distress, denies any constitutional symptoms and appears stated age.   HEENT:  Pupils are equal and round, sclera are not injected. External examination of ears and nose reveals no abnormalities. Cranial nerves II-X are grossly intact  Skin:  no rashes, abrasions or open wounds  on the affected extremity   Resp:  No respiratory distress or audible wheezing   CV: 2+  pulses, all extremities warm and well perfused   L hip   TTP over ischium  Pain over HS insertion with SLR, has increased numbness in lower leg/foot after this maneuver performed  No popping      This note was created by combination of typed  and M-Modal dictation. Transcription and phonetic errors may be present.  If there are any questions, please contact me.    Past Medical History:   Diagnosis Date    Abnormal Pap smear of cervix     CKC done (Dr Cote @ [facility]/[place]) - normla paps after    Encounter for Essure implantation     HPV in female 2017    Interstitial cystitis     Dr. Wheeler    Puberty, precocious        Active Problem List with Overview Notes    Diagnosis Date Noted    Myalgia of pelvic floor 2023    Interstitial cystitis      Dr. Wheeler         Past Surgical History:   Procedure Laterality Date     SECTION      COLD KNIFE CONIZATION OF CERVIX      w/ ESSURE     COLONOSCOPY  2020    NORMAL. Repat 3 yrs --  DR Jessica Vogt     COLONOSCOPY N/A 2020    Procedure: COLONOSCOPY;  Surgeon: Jessica Vogt MD;  Location: [facility];  Service: Endoscopy;  Laterality: N/A;  CV-19 ORDERED    VAGINAL BIRTH AFTER  SECTION         Family History   Problem Relation Age of Onset    Cancer Mother 44        colon    Colon cancer Mother     No Known Problems Father     No Known Problems Brother     No Known Problems Brother     Cancer Maternal Aunt         Lymphoma ??     Colon cancer Paternal Aunt     No Known Problems Paternal Grandfather     Heart attack Paternal Grandmother     Heart disease Maternal Grandmother     Diabetes Maternal Grandfather     Stroke Maternal Grandfather     Breast cancer Neg Hx

## 2023-02-23 NOTE — PROGRESS NOTES
Assessment: 48 y.o. female with ***    I explained my diagnostic impression and the reasoning behind it in detail, using layman's terms.  Models and/or pictures were used to help in the explanation.  We discussed non operative and operative treatment modalities -- {HE/SHE:97564} would like to start with {lgltreatment:48001} treatment in the form of ***.     Plan:   - ***  - Return to clinic in {NUMBERS 1-20:77728} {DAYS, WEEKS ,MONTHS:21894}. Return sooner if symptoms worsen or fail to improve.    All questions were answered in detail. The patient is in full agreement with the treatment plan and will proceed accordingly.    Chief Complaint   Patient presents with    Left Femur - Pain       Initial visit (***): Rosa Maria Kay is a 48 y.o. female who presents today complaining of Pain of the Left Femur     Duration of symptoms:  ***{DAYS, WEEKS ,MONTHS:21894}  Trauma or new activity: {YES/NO:97008}  Pain is {Constant/Intermittent:58891}   ***/10 at best and ***/10 at its worst  Aggravating factors: ***  Relieving factors: ***  Radicular symptoms: {YES/NO:67316} {Radiculopathy symptoms:21370}   Associated symptoms:  {pain associated symptoms:07363}.    Prior treatment:  {prior treatment :56000} {WITH-WITHOUT:16639} improvement in {lgl pain swelling range of motion:76109}.     Pain {DOES_DOES NOT:08165} interfere with {activities :00807}.      This patient was seen in consultation at the request of Dr. Oneyda Giles    This is the extent of the patient's complaints at this time.     Hand dominance: {Desc; Left/Right:85921}     Occupation:***    ROS ***     Review of patient's allergies indicates:  No Known Allergies      Current Outpatient Medications:     conjugated estrogens (PREMARIN) vaginal cream, 1 g with applicator or dime-sized amt with finger in vagina nightly x 2 weeks, then twice a week thereafter, Disp: 45 g, Rfl: 12    DAILY MULTI-VITAMIN ORAL, Take by mouth., Disp: , Rfl:     meloxicam (MOBIC)  15 MG tablet, Take 1 tablet (15 mg total) by mouth once daily., Disp: 30 tablet, Rfl: 0    methen-sod phos-meth blue-hyos (UROGESIC-BLUE/URYL) 81.6-40.8-0.12 mg Tab, TAKE ONE TABLET BY MOUTH EVERY 6 HOURS AS NEEDED (MAXIMUM OF 5 DAYS) FOR BLADDER PAIN, Disp: 60 tablet, Rfl: 0    progesterone (PROMETRIUM) 100 MG capsule, Take 1 capsule by mouth 30-60 minutes before bed every night, Disp: 90 capsule, Rfl: 3    rOPINIRole (REQUIP) 1 MG tablet, TAKE ONE TABLET BY MOUTH TWICE DAILY, Disp: 60 tablet, Rfl: 11    TAZORAC 0.05 % Crea cream, Apply 1 application topically nightly., Disp: , Rfl:     diclofenac sodium (VOLTAREN) 1 % Gel, Upper extremities: Apply the gel (2 g) to the affected area 4 times daily. Do not apply more than 8 g daily to any one affected joint of the upper extremities., Disp: 100 g, Rfl: 5    spironolactone (ALDACTONE) 50 MG tablet, Take 1 tablet (50 mg total) by mouth once daily., Disp: 30 tablet, Rfl: 11    trospium (SANCTURA) 20 mg Tab tablet, Take 1 tablet (20 mg total) by mouth 2 (two) times daily., Disp: 60 tablet, Rfl: 11    vibegron 75 mg Tab, Take 75 mg by mouth once daily., Disp: 30 tablet, Rfl: 11    Physical Exam:   Vitals:    02/23/23 1135   PainSc:   5   PainLoc: Leg       General:   There is no height or weight on file to calculate BMI.  *** Patient is alert, awake and oriented to time, place and person. Mood and affect are appropriate.  Patient does not appear to be in any distress, denies any constitutional symptoms and appears stated age.   HEENT: *** Pupils are equal and round, sclera are not injected. External examination of ears and nose reveals no abnormalities. Cranial nerves II-X are grossly intact  Neck:*** examination demonstrates painless *** active range of motion. Spurling's sign is {NEGATIVE/POSITIVE:02477}  Skin: *** no rashes, abrasions or open wounds on the affected extremity   Resp: *** No respiratory distress or audible wheezing   CV: 2+ *** pulses, all extremities  warm and well perfused     {Desc; Left/Right:78554} {examlocation:64405}     Imaging: ***    I personally reviewed and interpreted the patient's imaging obtained {lglimagingtimin}       A note notifying Dr. Oneyda Giles of my findings was sent via the electronic medical record     This note was created by combination of typed  and M-Modal dictation. Transcription and phonetic errors may be present.  If there are any questions, please contact me.    Past Medical History:   Diagnosis Date    Abnormal Pap smear of cervix 2017    CKC done (Dr Cote @ [facility]/[place]) - normla paps after    Encounter for Essure implantation     HPV in female 2017    Interstitial cystitis     Dr. Wheeler    Puberty, precocious        Active Problem List with Overview Notes    Diagnosis Date Noted    Myalgia of pelvic floor 2023    Interstitial cystitis      Dr. Wheeler         Past Surgical History:   Procedure Laterality Date     SECTION      COLD KNIFE CONIZATION OF CERVIX      w/ ESSURE     COLONOSCOPY  2020    NORMAL. Repat 3 yrs --  DR Jessica Vogt     COLONOSCOPY N/A 2020    Procedure: COLONOSCOPY;  Surgeon: Jessica Vogt MD;  Location: [facility];  Service: Endoscopy;  Laterality: N/A;  CV-19 ORDERED    VAGINAL BIRTH AFTER  SECTION         Family History   Problem Relation Age of Onset    Cancer Mother 44        colon    Colon cancer Mother     No Known Problems Father     No Known Problems Brother     No Known Problems Brother     Cancer Maternal Aunt         Lymphoma ??     Colon cancer Paternal Aunt     No Known Problems Paternal Grandfather     Heart attack Paternal Grandmother     Heart disease Maternal Grandmother     Diabetes Maternal Grandfather     Stroke Maternal Grandfather     Breast cancer Neg Hx

## 2023-02-24 ENCOUNTER — PATIENT MESSAGE (OUTPATIENT)
Dept: ORTHOPEDICS | Facility: CLINIC | Age: 48
End: 2023-02-24
Payer: COMMERCIAL

## 2023-02-27 ENCOUNTER — CLINICAL SUPPORT (OUTPATIENT)
Dept: REHABILITATION | Facility: OTHER | Age: 48
End: 2023-02-27
Attending: OBSTETRICS & GYNECOLOGY
Payer: COMMERCIAL

## 2023-02-27 DIAGNOSIS — M79.18 MYALGIA OF PELVIC FLOOR: Primary | ICD-10-CM

## 2023-02-27 PROCEDURE — 97110 THERAPEUTIC EXERCISES: CPT | Mod: CQ

## 2023-02-27 PROCEDURE — 97140 MANUAL THERAPY 1/> REGIONS: CPT | Mod: CQ

## 2023-02-27 NOTE — PROGRESS NOTES
"  Pelvic Health Physical Therapy   Treatment Note     Name: Rosa Maria Long [facility]  Clinic Number: [id number]    Therapy Diagnosis:   Encounter Diagnosis   Name Primary?    Myalgia of pelvic floor Yes         Physician: Yeny Silva,*    Visit Date: 2023    Physician Orders: PT Eval and Treat  Medical Diagnosis from Referral: interstitial cystitis  Evaluation Date: 2023  Authorization Period Expiration: 1 visit  Plan of Care Expiration: 23  Visit # / Visits authorized:      Time In: 3:00pm  Time Out: 4:05pm  Total Appointment Time (timed & untimed codes): 55 minutes     Precautions: universal  Subjective   Pt reports: Vaginal US last Monday that increased urgency and pain for two days, had to take medication for two days. Feeling better today, less urgency. Not pushing when peeing, doing diaphragmatic breathing.    She was compliant with home exercise program.  Response to previous treatment: No adverse effects   Functional change: Decreased urgency    Pain: 0/10  Location:     Pts goals: to get a refresher and make sure she is doing her exercises correctly and to minimize irritation  To be able to return to intercourse without discomfort after    Objective   Pt verbally consents to intravaginal treatment today.  Signed consent form already on file.    Rosa Maria received therapeutic exercises to develop  for 10 minutes including:     Pelvic tilts  Supine hip flexor stretch + DM 1 min  Open book x10,3"  Cat cow x15  Piriformis stretch 1 min  +Bridge w/Green band x15,3"  +TrA + BKFO w/RTB x10          Rosa Maria received the following manual therapy techniques:  for 45 minutes including:   scar tissue mobilization abdominal scar with external techniques and bi-manual  Bimanual STM to periurethral and endopelvic fascia tissues   STM B iliacus  STM abdominal region over bladder   CR R hip flexor 3x5"    Rosa Maria participated in neuromuscular re-education activities to develop for 5 minutes includin " breathing   Pelvic tilts - focus on isolation of pelvic motion from ribs    Rosa Maria participated in dynamic functional therapeutic activities to improve functional performance for 0 minutes, including:  Breathing mechanics with BM  Urge suppression-roll for control  Discussion of not squatting at public restrooms  Double voiding techniques    Home Exercises Provided and Patient Education Provided     Education provided:   - bladder retraining and diaphragmatic breathing  Discussed progression of plan of care with patient; educated pt in activity modification; reviewed HEP with pt. Pt demonstrated and verbalized understanding of all instruction and was provided with a handout of HEP (see Patient Instructions).    Written Home Exercises Provided: yes.  Exercises were reviewed and Rosa Maria was able to demonstrate them prior to the end of the session.  Rosa Maria demonstrated good  understanding of the education provided.     See EMR under Patient Instructions for exercises provided     Assessment   Patient returns with improved symptoms, has been feeling more empty after BM since changing breathing and is no longer pushing when peeing. Less tension noted in PFM and hip flexors this visit. Initiated core and lumbopelvic stabilization exercises and ended with breathing and stretching. Improved pelvic mobility.      Rosa Maria is progressing well towards her goals.   Pt prognosis is Excellent.     Pt will continue to benefit from skilled outpatient physical therapy to address the deficits listed in the problem list box on initial evaluation, provide pt/family education and to maximize pt's level of independence in the home and community environment.     Pt's spiritual, cultural and educational needs considered and pt agreeable to plan of care and goals.     Anticipated barriers to physical therapy: None    Goals: Short Term Goals: 4 weeks   Pt indep in HEP  Pt able to wait at least 2 hours between trips to the bathroom for improved  participation in ADLs  Pt demo pelvic floor mm strength at least 3+/5 for improved support of pelvic organs  Pt indep in pelvic floor relaxation strategies  Pt indep in use of pelvic wand  Pt indep in urge suppression techniques     Long Term Goals: 8 weeks   Pt indep in progressive HEP  Pt reports 0 episodes of leakage in at least 2 weeks for improved ADL participation  Pt able to wait at least 2.5-4 hours between trips to the bathroom   Nocturia no more than 1x/night for improved quality of sleep  Pt reports able to have painfree intercourse with no increased discomfort afterwards for improved participation in ADLs and improved intimacy with partner   Pt demo pelvic floor mm strength at least 4/5 for improved support of pelvic organs  Plan   Plan of care Certification: 1/20/2023 to 4/18/23.     Outpatient Physical Therapy 1 times weekly for 12 weeks to include the following interventions: therapeutic exercises, therapeutic activity, neuromuscular re-education, manual therapy, patient/family education and self care/home management     Next visit: manual, check in about double voiding, BM?          Ana Shah, PTA

## 2023-03-17 ENCOUNTER — CLINICAL SUPPORT (OUTPATIENT)
Dept: REHABILITATION | Facility: OTHER | Age: 48
End: 2023-03-17
Attending: OBSTETRICS & GYNECOLOGY
Payer: COMMERCIAL

## 2023-03-17 ENCOUNTER — PATIENT MESSAGE (OUTPATIENT)
Dept: REHABILITATION | Facility: OTHER | Age: 48
End: 2023-03-17

## 2023-03-17 DIAGNOSIS — M79.18 MYALGIA OF PELVIC FLOOR: Primary | ICD-10-CM

## 2023-03-17 PROCEDURE — 97110 THERAPEUTIC EXERCISES: CPT

## 2023-03-17 PROCEDURE — 97140 MANUAL THERAPY 1/> REGIONS: CPT

## 2023-03-17 PROCEDURE — 97530 THERAPEUTIC ACTIVITIES: CPT

## 2023-03-17 NOTE — PATIENT INSTRUCTIONS
Home Program 3/17/23:    Continue previously given exercises and add    Quick Flicks    Contract pelvic floor muscles by closing around your openings and lifting up and in. Try not to use abdominal or buttocks muscles, and do not hold your breath. Relax completely after each contraction.     Repeat 5 times. Follow with 5 deep breaths. Perform 3 sets/day.

## 2023-03-17 NOTE — PROGRESS NOTES
"  Pelvic Health Physical Therapy   Treatment Note     Name: Rosa Maria Long [facility]  Clinic Number: [id number]    Therapy Diagnosis:   No diagnosis found.    Physician: Yeny Silva,*    Visit Date: 3/17/2023    Physician Orders: PT Eval and Treat  Medical Diagnosis from Referral: interstitial cystitis  Evaluation Date: 1/20/2023  Authorization Period Expiration: 1 visit  Plan of Care Expiration: 4/18/23  Visit # / Visits authorized: 5/12    FOTO: 2 completed 3/17/23     Time In: 805  Time Out: 900  Total Appointment Time (timed & untimed codes): 55 minutes     Precautions: universal  Subjective   Pt reports: Having improved urinary frequency, able to go a couple hours. But has found it is definitely affected by anxiety. Has been doing lumbopelvic stabilization exercises and going well, "feels really good". Has not been having intercourse but it is not because of physical limitations. Nocturia 1x/night, no leakage. Uses her own version of urge suppression techniques which do work.    She was compliant with home exercise program.  Response to previous treatment: No adverse effects   Functional change: Decreased urgency    Pain: 0/10  Location:     Pts goals: to get a refresher and make sure she is doing her exercises correctly and to minimize irritation  To be able to return to intercourse without discomfort after    Objective   Pt verbally consents to intravaginal treatment today.  Signed consent form already on file.    Rosa Maria received therapeutic exercises to develop  for 15 minutes including:   PFME - contractions focus on 3 layers and complete relaxation, breathing  TrA + BKFO w/YTB x10  Clamshells, YTB, with TrA activation x 10      Not performed today:  Pelvic tilts  Supine hip flexor stretch + DM 1 min  Open book x10,3"  Cat cow x15  Piriformis stretch 1 min  Bridge w/Green band x15,3"      Rosa Maria received the following manual therapy techniques: for 30 minutes including:   scar tissue mobilization abdominal " "scar with external techniques and bi-manual  Bimanual STM to periurethral and endopelvic fascia tissues   STM B iliacus  STM abdominal region over bladder   CR R hip flexor 3x5"    Rosa Maria participated in neuromuscular re-education activities to develop for 00 minutes including:   Not performed today  360 breathing   Pelvic tilts - focus on isolation of pelvic motion from ribs    Rosa Maria participated in dynamic functional therapeutic activities to improve functional performance for 10 minutes, including:  Pelvic wand training - educated on care of wand, use of lubricant, how to insert, different techniques to use. Therapist inserted and demonstrated appropriate pressure and how to guide handle, then had pt hold handle and control wand while giving feedback      Not performed today:  Breathing mechanics with BM  Urge suppression-roll for control  Discussion of not squatting at public restrooms  Double voiding techniques    Home Exercises Provided and Patient Education Provided     Education provided:   - bladder retraining and diaphragmatic breathing  Discussed progression of plan of care with patient; educated pt in activity modification; reviewed HEP with pt. Pt demonstrated and verbalized understanding of all instruction and was provided with a handout of HEP (see Patient Instructions).    Written Home Exercises Provided: yes.  Exercises were reviewed and Rosa Maria was able to demonstrate them prior to the end of the session.  Rosa Maria demonstrated good  understanding of the education provided.     See EMR under Patient Instructions for exercises provided     Assessment   Performed wand training and pt will begin using at home. Progressed PFME for low rep contractions as she is showing improved relaxation and ability to relax after henri. Required cues for henri at all 3 layers. With BKFO she was not performing with TrA hold during the exercise and corrected. She will continue all other exercises.      Rosa Maria is " progressing well towards her goals.   Pt prognosis is Excellent.     Pt will continue to benefit from skilled outpatient physical therapy to address the deficits listed in the problem list box on initial evaluation, provide pt/family education and to maximize pt's level of independence in the home and community environment.     Pt's spiritual, cultural and educational needs considered and pt agreeable to plan of care and goals.     Anticipated barriers to physical therapy: None    Goals: Short Term Goals: 4 weeks   Pt indep in HEP MET  Pt able to wait at least 2 hours between trips to the bathroom for improved participation in ADLs MET  Pt demo pelvic floor mm strength at least 3+/5 for improved support of pelvic organs MET  Pt indep in pelvic floor relaxation strategies MET  Pt indep in use of pelvic wand  Pt indep in urge suppression techniques MET      Long Term Goals: 8 weeks   Pt indep in progressive HEP PROGRESSING  Pt reports 0 episodes of leakage in at least 2 weeks for improved ADL participation MET  Pt able to wait at least 2.5-4 hours between trips to the bathroom PROGRESSING  Nocturia no more than 1x/night for improved quality of sleep MET  Pt reports able to have painfree intercourse with no increased discomfort afterwards for improved participation in ADLs and improved intimacy with partner   Pt demo pelvic floor mm strength at least 4/5 for improved support of pelvic organs PROGRESSING  Plan   Plan of care Certification: 1/20/2023 to 4/18/23.     Outpatient Physical Therapy 1 times weekly for 12 weeks to include the following interventions: therapeutic exercises, therapeutic activity, neuromuscular re-education, manual therapy, patient/family education and self care/home management     Next visit: manual, review wand, progress core and hips      Missy Lynn, PT

## 2023-03-20 ENCOUNTER — OFFICE VISIT (OUTPATIENT)
Dept: PAIN MEDICINE | Facility: CLINIC | Age: 48
End: 2023-03-20
Payer: COMMERCIAL

## 2023-03-20 VITALS
HEART RATE: 76 BPM | RESPIRATION RATE: 16 BRPM | SYSTOLIC BLOOD PRESSURE: 110 MMHG | OXYGEN SATURATION: 100 % | DIASTOLIC BLOOD PRESSURE: 67 MMHG

## 2023-03-20 DIAGNOSIS — M54.16 LUMBAR RADICULOPATHY: ICD-10-CM

## 2023-03-20 DIAGNOSIS — M54.16 LEFT LUMBAR RADICULOPATHY: ICD-10-CM

## 2023-03-20 DIAGNOSIS — M47.816 LUMBAR SPONDYLOSIS: ICD-10-CM

## 2023-03-20 DIAGNOSIS — M51.36 DDD (DEGENERATIVE DISC DISEASE), LUMBAR: Primary | ICD-10-CM

## 2023-03-20 PROBLEM — M51.369 DDD (DEGENERATIVE DISC DISEASE), LUMBAR: Status: ACTIVE | Noted: 2023-03-20

## 2023-03-20 PROCEDURE — 99204 PR OFFICE/OUTPT VISIT, NEW, LEVL IV, 45-59 MIN: ICD-10-PCS | Mod: S$GLB,,, | Performed by: PAIN MEDICINE

## 2023-03-20 PROCEDURE — 99999 PR PBB SHADOW E&M-EST. PATIENT-LVL V: CPT | Mod: PBBFAC,,, | Performed by: PAIN MEDICINE

## 2023-03-20 PROCEDURE — 1159F PR MEDICATION LIST DOCUMENTED IN MEDICAL RECORD: ICD-10-PCS | Mod: CPTII,S$GLB,, | Performed by: PAIN MEDICINE

## 2023-03-20 PROCEDURE — 1160F RVW MEDS BY RX/DR IN RCRD: CPT | Mod: CPTII,S$GLB,, | Performed by: PAIN MEDICINE

## 2023-03-20 PROCEDURE — 1159F MED LIST DOCD IN RCRD: CPT | Mod: CPTII,S$GLB,, | Performed by: PAIN MEDICINE

## 2023-03-20 PROCEDURE — 3078F PR MOST RECENT DIASTOLIC BLOOD PRESSURE < 80 MM HG: ICD-10-PCS | Mod: CPTII,S$GLB,, | Performed by: PAIN MEDICINE

## 2023-03-20 PROCEDURE — 3078F DIAST BP <80 MM HG: CPT | Mod: CPTII,S$GLB,, | Performed by: PAIN MEDICINE

## 2023-03-20 PROCEDURE — 3074F PR MOST RECENT SYSTOLIC BLOOD PRESSURE < 130 MM HG: ICD-10-PCS | Mod: CPTII,S$GLB,, | Performed by: PAIN MEDICINE

## 2023-03-20 PROCEDURE — 99204 OFFICE O/P NEW MOD 45 MIN: CPT | Mod: S$GLB,,, | Performed by: PAIN MEDICINE

## 2023-03-20 PROCEDURE — 3074F SYST BP LT 130 MM HG: CPT | Mod: CPTII,S$GLB,, | Performed by: PAIN MEDICINE

## 2023-03-20 PROCEDURE — 1160F PR REVIEW ALL MEDS BY PRESCRIBER/CLIN PHARMACIST DOCUMENTED: ICD-10-PCS | Mod: CPTII,S$GLB,, | Performed by: PAIN MEDICINE

## 2023-03-20 PROCEDURE — 99999 PR PBB SHADOW E&M-EST. PATIENT-LVL V: ICD-10-PCS | Mod: PBBFAC,,, | Performed by: PAIN MEDICINE

## 2023-03-20 NOTE — PROGRESS NOTES
Subjective:     Patient ID: Rosa Maria Kay is a 48 y.o. female    Chief Complaint: Leg Pain      Referred by: Oneyda Giles MD      HPI:    Initial Encounter (3/20/23):  Rosa Maria Kay is a 48 y.o. female who presents today with left-sided lower extremity pain.  This pain started in November of 2022.  Patient states that she went to kick something had sudden onset of pain.  Pain is located in the left posterior proximal thigh it will extend along the posterior thigh.  She does report some paresthesias in the left lateral leg at times.  She denies having any significant low back pain.  She denies any focal weakness or bowel bladder dysfunction.  Pain is constant and worsened with activity.  Patient performs regular stretching every day and has not noticed any improvement..   This pain is described in detail below.    Physical Therapy:  No.    Non-pharmacologic Treatment:  Rest helps         TENS?  No    Pain Medications:         Currently taking:  Gabapentin    Has tried in the past:  NSAIDs (GI upset), Tylenol    Has not tried:  Opioids, Muscle relaxants, TCAs, SNRIs, topical creams    Blood thinners:  None    Interventional Therapies:  None    Relevant Surgeries:  None    Affecting sleep?  Yes    Affecting daily activities? yes    Depressive symptoms? no          SI/HI? No    Work status: Employed    Pain Scores:    Best:       2/10  Worst:     5/10  Usually:   3/10  Today:    2/10    Pain Disability Index  Family/Home Responsibilities:: 0  Recreation:: 4  Social Activity:: 1  Occupation:: 2  Sexual Behavior:: 0  Self Care:: 0  Life-Support Activities:: 2  Pain Disability Index (PDI): 9    Review of Systems   Constitutional:  Negative for activity change, appetite change, chills, fatigue, fever and unexpected weight change.   HENT:  Negative for hearing loss.    Eyes:  Negative for visual disturbance.   Respiratory:  Negative for chest tightness and shortness of breath.    Cardiovascular:   Negative for chest pain.   Gastrointestinal:  Negative for abdominal pain, constipation, diarrhea, nausea and vomiting.   Genitourinary:  Negative for difficulty urinating.   Musculoskeletal:  Positive for gait problem and myalgias. Negative for back pain and neck pain.   Skin:  Negative for rash.   Neurological:  Positive for numbness. Negative for dizziness, weakness, light-headedness and headaches.   Psychiatric/Behavioral:  Positive for sleep disturbance. Negative for hallucinations and suicidal ideas. The patient is not nervous/anxious.      Past Medical History:   Diagnosis Date    Abnormal Pap smear of cervix 2017    CKC done (Dr Cote @ [facility]/[place]) - normla paps after    Encounter for Essure implantation     HPV in female 2017    Interstitial cystitis     Dr. Wheeler    Puberty, precocious        Past Surgical History:   Procedure Laterality Date     SECTION      COLD KNIFE CONIZATION OF CERVIX      w/ ESSURE     COLONOSCOPY  2020    NORMAL. Repat 3 yrs --  DR Jessica Vogt     COLONOSCOPY N/A 2020    Procedure: COLONOSCOPY;  Surgeon: Jessica Vogt MD;  Location: [facility];  Service: Endoscopy;  Laterality: N/A;  CV-19 ORDERED    VAGINAL BIRTH AFTER  SECTION         Social History     Socioeconomic History    Marital status:    Tobacco Use    Smoking status: Never    Smokeless tobacco: Never   Substance and Sexual Activity    Alcohol use: Yes     Comment: Occational    Drug use: No    Sexual activity: Yes     Partners: Male     Birth control/protection: Surgical     Comment: :     ESSURE  2017     Social Determinants of Health     Financial Resource Strain: Low Risk     Difficulty of Paying Living Expenses: Not hard at all   Food Insecurity: No Food Insecurity    Worried About Running Out of Food in the Last Year: Never true    Ran Out of Food in the Last Year: Never true   Transportation Needs: No Transportation Needs    Lack of Transportation  (Medical): No    Lack of Transportation (Non-Medical): No   Physical Activity: Sufficiently Active    Days of Exercise per Week: 7 days    Minutes of Exercise per Session: 50 min   Stress: No Stress Concern Present    Feeling of Stress : Only a little   Social Connections: Unknown    Frequency of Communication with Friends and Family: Once a week    Frequency of Social Gatherings with Friends and Family: Patient refused    Active Member of Clubs or Organizations: No    Attends Club or Organization Meetings: Patient refused    Marital Status:    Housing Stability: Low Risk     Unable to Pay for Housing in the Last Year: No    Number of Places Lived in the Last Year: 2    Unstable Housing in the Last Year: No       Review of patient's allergies indicates:  No Known Allergies    Current Outpatient Medications on File Prior to Visit   Medication Sig Dispense Refill    conjugated estrogens (PREMARIN) vaginal cream 1 g with applicator or dime-sized amt with finger in vagina nightly x 2 weeks, then twice a week thereafter 45 g 12    DAILY MULTI-VITAMIN ORAL Take by mouth.      methen-sod phos-meth blue-hyos (UROGESIC-BLUE/URYL) 81.6-40.8-0.12 mg Tab TAKE ONE TABLET BY MOUTH EVERY 6 HOURS AS NEEDED (MAXIMUM OF 5 DAYS) FOR BLADDER PAIN 60 tablet 0    progesterone (PROMETRIUM) 100 MG capsule Take 1 capsule by mouth 30-60 minutes before bed every night 90 capsule 3    rOPINIRole (REQUIP) 1 MG tablet TAKE ONE TABLET BY MOUTH TWICE DAILY 60 tablet 11    TAZORAC 0.05 % Crea cream Apply 1 application topically nightly.      trospium (SANCTURA) 20 mg Tab tablet Take 1 tablet (20 mg total) by mouth 2 (two) times daily. 60 tablet 11    [DISCONTINUED] diclofenac sodium (VOLTAREN) 1 % Gel Upper extremities: Apply the gel (2 g) to the affected area 4 times daily. Do not apply more than 8 g daily to any one affected joint of the upper extremities. 100 g 5    [DISCONTINUED] gabapentin (NEURONTIN) 300 MG capsule Take 1 capsule (300  mg total) by mouth 3 (three) times daily. 90 capsule 0    [DISCONTINUED] meloxicam (MOBIC) 15 MG tablet Take 1 tablet (15 mg total) by mouth once daily. 30 tablet 0    [DISCONTINUED] spironolactone (ALDACTONE) 50 MG tablet Take 1 tablet (50 mg total) by mouth once daily. 30 tablet 11    [DISCONTINUED] vibegron 75 mg Tab Take 75 mg by mouth once daily. 30 tablet 11     No current facility-administered medications on file prior to visit.       Objective:      /67   Pulse 76   Resp 16   SpO2 100%     Exam:  GEN:  Well developed, well nourished.  No acute distress.  Normal pain behavior.  HEENT:  No trauma.  Mucous membranes moist.  Nares patent bilaterally.  PSYCH: Normal affect. Thought content appropriate.  CHEST:  Breathing symmetric.  No audible wheezing.  ABD: Soft, non-distended.  SKIN:  Warm, pink, dry.  No rash on exposed areas.    EXT:  No cyanosis, clubbing, or edema.  No color change or changes in nail or hair growth.  NEURO/MUSCULOSKELETAL:  Fully alert, oriented, and appropriate. Speech normal kirill. No cranial nerve deficits.   Gait:  Normal.  No trendelenburg sign bilaterally.   Motor Strength:  5/5 motor strength throughout lower extremities.   Sensory:  No sensory deficit in the lower extremities.   Reflexes:  2+ and symmetric throughout.  Downgoing Babinski's bilaterally.  No clonus or spasticity.  L-Spine:  Full ROM without pain on flexion or extension.  Negative pain with axial/facet loading bilaterally.  Negative SLR bilaterally.    No TTP over lumbar paraspinals, bilateral SI joints, hips, piriformis muscles, or GTB.    No tenderness over right ischium/bursa or proximal hamstrings    Pain somewhat reproduced with hamstring stretches      Imaging:  No pertinent imaging    Assessment:       Encounter Diagnoses   Name Primary?    Left lumbar radiculopathy     DDD (degenerative disc disease), lumbar Yes    Lumbar spondylosis     Lumbar radiculopathy          Plan:       Rosa Maria was seen today  for leg pain.    Diagnoses and all orders for this visit:    DDD (degenerative disc disease), lumbar  -     X-Ray Lumbar Spine Ap Lateral w/Flex Ext; Future  -     Ambulatory referral/consult to Physical/Occupational Therapy; Future    Left lumbar radiculopathy  -     Ambulatory referral/consult to Pain Clinic    Lumbar spondylosis  -     X-Ray Lumbar Spine Ap Lateral w/Flex Ext; Future  -     Ambulatory referral/consult to Physical/Occupational Therapy; Future    Lumbar radiculopathy  -     X-Ray Lumbar Spine Ap Lateral w/Flex Ext; Future  -     Ambulatory referral/consult to Physical/Occupational Therapy; Future        Rosa Maria Kay is a 48 y.o. female with chronic left lower extremity pain.  Exact etiology is unclear.  May be related to hamstring strain.  Does have some intermittent paresthesias of the left lateral leg which may indicate lumbar radiculopathy though not having any back pain.  Physical examination not specific for any particular etiology.    Lumbar x-rays to get baseline imaging.  May consider getting lumbar MRI in the future if focal degenerative disc disease noted.  Refer to PT for ROM, strengthening, stretching and HEP.  Return to clinic 6 weeks or sooner if needed.  At that time we will discuss efficacy of physical therapy and review x-ray results.  May consider lumbar MRI if pain persists or worsens.            This note was created by combination of typed  and M-Modal dictation. Transcription and phonetic errors may be present.  If there are any questions, please contact me.

## 2023-03-22 ENCOUNTER — PATIENT MESSAGE (OUTPATIENT)
Dept: REHABILITATION | Facility: OTHER | Age: 48
End: 2023-03-22
Payer: COMMERCIAL

## 2023-03-28 ENCOUNTER — APPOINTMENT (OUTPATIENT)
Dept: RADIOLOGY | Facility: HOSPITAL | Age: 48
End: 2023-03-28
Attending: PAIN MEDICINE
Payer: COMMERCIAL

## 2023-03-28 DIAGNOSIS — M51.36 DDD (DEGENERATIVE DISC DISEASE), LUMBAR: ICD-10-CM

## 2023-03-28 DIAGNOSIS — M54.16 LUMBAR RADICULOPATHY: ICD-10-CM

## 2023-03-28 DIAGNOSIS — M47.816 LUMBAR SPONDYLOSIS: ICD-10-CM

## 2023-03-28 PROCEDURE — 72110 XR LUMBAR SPINE AP AND LAT WITH FLEX/EXT: ICD-10-PCS | Mod: 26,,, | Performed by: RADIOLOGY

## 2023-03-28 PROCEDURE — 72110 X-RAY EXAM L-2 SPINE 4/>VWS: CPT | Mod: TC,FY,PN

## 2023-03-28 PROCEDURE — 72110 X-RAY EXAM L-2 SPINE 4/>VWS: CPT | Mod: 26,,, | Performed by: RADIOLOGY

## 2023-04-06 ENCOUNTER — CLINICAL SUPPORT (OUTPATIENT)
Dept: REHABILITATION | Facility: OTHER | Age: 48
End: 2023-04-06
Attending: OBSTETRICS & GYNECOLOGY
Payer: COMMERCIAL

## 2023-04-06 DIAGNOSIS — M79.18 MYALGIA OF PELVIC FLOOR: Primary | ICD-10-CM

## 2023-04-06 PROCEDURE — 97140 MANUAL THERAPY 1/> REGIONS: CPT

## 2023-04-06 PROCEDURE — 97110 THERAPEUTIC EXERCISES: CPT

## 2023-04-06 NOTE — PROGRESS NOTES
"  Pelvic Health Physical Therapy   Treatment Note     Name: Rosa Maria [facility]  Clinic Number: [id number]    Therapy Diagnosis:   No diagnosis found.    Physician: Yeny Silva,*    Visit Date: 4/6/2023    Physician Orders: PT Eval and Treat  Medical Diagnosis from Referral: interstitial cystitis  Evaluation Date: 1/20/2023  Authorization Period Expiration: 1 visit  Plan of Care Expiration: 4/18/23  Visit # / Visits authorized: 6/12    FOTO: 2 completed 3/17/23     Time In: 300  Time Out: 355  Total Appointment Time (timed & untimed codes): 55 minutes     Precautions: universal  Subjective   Pt reports: Past couple weeks have not been good. Urgency at night has been bad and has had to take meds a couple times. Gets throbbing sensation in the urethra. Also had intercourse and had urethral pain for 3 days. Not sure what caused the flare except maybe cyclical.     She was compliant with home exercise program.  Response to previous treatment: No adverse effects   Functional change: Decreased urgency    Pain: 0/10  Location:     Pts goals: to get a refresher and make sure she is doing her exercises correctly and to minimize irritation  To be able to return to intercourse without discomfort after    Objective   Pt verbally consents to intravaginal treatment today.  Signed consent form already on file.    Rosa Maria received therapeutic exercises to develop  for 10 minutes including:   PPU and review how to perform at home        PFME - contractions focus on 3 layers and complete relaxation, breathing  TrA + BKFO w/YTB x10  Clamshells, YTB, with TrA activation x 10      Not performed today:  Pelvic tilts  Supine hip flexor stretch + DM 1 min  Open book x10,3"  Cat cow x15  Piriformis stretch 1 min  Bridge w/Green band x15,3"      Rosa Maria received the following manual therapy techniques: for 45 minutes including:   scar tissue mobilization abdominal scar with external techniques and bi-manual  Bimanual STM to periurethral " and endopelvic fascia tissues   STM B iliacus  STM abdominal region over bladder   CPA through upper l-spine    Rosa Maria participated in neuromuscular re-education activities to develop for 00 minutes including:   Not performed today  360 breathing   Pelvic tilts - focus on isolation of pelvic motion from ribs    Rosa Maria participated in dynamic functional therapeutic activities to improve functional performance for 00 minutes, including:  Pelvic wand training - educated on care of wand, use of lubricant, how to insert, different techniques to use. Therapist inserted and demonstrated appropriate pressure and how to guide handle, then had pt hold handle and control wand while giving feedback      Not performed today:  Breathing mechanics with BM  Urge suppression-roll for control  Discussion of not squatting at public restrooms  Double voiding techniques    Home Exercises Provided and Patient Education Provided     Education provided:   - bladder retraining and diaphragmatic breathing  Discussed progression of plan of care with patient; educated pt in activity modification; reviewed HEP with pt. Pt demonstrated and verbalized understanding of all instruction and was provided with a handout of HEP (see Patient Instructions).    Written Home Exercises Provided: yes.  Exercises were reviewed and Rosa Maria was able to demonstrate them prior to the end of the session.  Rosa Maria demonstrated good  understanding of the education provided.     See EMR under Patient Instructions for exercises provided     Assessment   Provided aloe vera samples. Rec try and use wand before next session. Added spinal ROM exercises and she will monitor effects on symptoms.     Rosa Maria is progressing well towards her goals.   Pt prognosis is Excellent.     Pt will continue to benefit from skilled outpatient physical therapy to address the deficits listed in the problem list box on initial evaluation, provide pt/family education and to maximize pt's level of  independence in the home and community environment.     Pt's spiritual, cultural and educational needs considered and pt agreeable to plan of care and goals.     Anticipated barriers to physical therapy: None    Goals: Short Term Goals: 4 weeks   Pt indep in HEP MET  Pt able to wait at least 2 hours between trips to the bathroom for improved participation in ADLs MET  Pt demo pelvic floor mm strength at least 3+/5 for improved support of pelvic organs MET  Pt indep in pelvic floor relaxation strategies MET  Pt indep in use of pelvic wand  Pt indep in urge suppression techniques MET      Long Term Goals: 8 weeks   Pt indep in progressive HEP PROGRESSING  Pt reports 0 episodes of leakage in at least 2 weeks for improved ADL participation MET  Pt able to wait at least 2.5-4 hours between trips to the bathroom PROGRESSING  Nocturia no more than 1x/night for improved quality of sleep MET  Pt reports able to have painfree intercourse with no increased discomfort afterwards for improved participation in ADLs and improved intimacy with partner   Pt demo pelvic floor mm strength at least 4/5 for improved support of pelvic organs PROGRESSING  Plan   Plan of care Certification: 1/20/2023 to 4/18/23.     Outpatient Physical Therapy 1 times weekly for 12 weeks to include the following interventions: therapeutic exercises, therapeutic activity, neuromuscular re-education, manual therapy, patient/family education and self care/home management     Next visit: manual, review ursula, progress core and hips      Missy Lynn, PT

## 2023-04-11 ENCOUNTER — OFFICE VISIT (OUTPATIENT)
Dept: UROGYNECOLOGY | Facility: CLINIC | Age: 48
End: 2023-04-11
Payer: COMMERCIAL

## 2023-04-11 VITALS
SYSTOLIC BLOOD PRESSURE: 96 MMHG | DIASTOLIC BLOOD PRESSURE: 74 MMHG | WEIGHT: 114.88 LBS | HEIGHT: 62 IN | BODY MASS INDEX: 21.14 KG/M2

## 2023-04-11 DIAGNOSIS — N30.10 INTERSTITIAL CYSTITIS: Primary | ICD-10-CM

## 2023-04-11 PROCEDURE — 1159F PR MEDICATION LIST DOCUMENTED IN MEDICAL RECORD: ICD-10-PCS | Mod: CPTII,S$GLB,, | Performed by: OBSTETRICS & GYNECOLOGY

## 2023-04-11 PROCEDURE — 3078F PR MOST RECENT DIASTOLIC BLOOD PRESSURE < 80 MM HG: ICD-10-PCS | Mod: CPTII,S$GLB,, | Performed by: OBSTETRICS & GYNECOLOGY

## 2023-04-11 PROCEDURE — 3008F PR BODY MASS INDEX (BMI) DOCUMENTED: ICD-10-PCS | Mod: CPTII,S$GLB,, | Performed by: OBSTETRICS & GYNECOLOGY

## 2023-04-11 PROCEDURE — 3074F SYST BP LT 130 MM HG: CPT | Mod: CPTII,S$GLB,, | Performed by: OBSTETRICS & GYNECOLOGY

## 2023-04-11 PROCEDURE — 3074F PR MOST RECENT SYSTOLIC BLOOD PRESSURE < 130 MM HG: ICD-10-PCS | Mod: CPTII,S$GLB,, | Performed by: OBSTETRICS & GYNECOLOGY

## 2023-04-11 PROCEDURE — 99999 PR PBB SHADOW E&M-EST. PATIENT-LVL III: CPT | Mod: PBBFAC,,, | Performed by: OBSTETRICS & GYNECOLOGY

## 2023-04-11 PROCEDURE — 1159F MED LIST DOCD IN RCRD: CPT | Mod: CPTII,S$GLB,, | Performed by: OBSTETRICS & GYNECOLOGY

## 2023-04-11 PROCEDURE — 99213 PR OFFICE/OUTPT VISIT, EST, LEVL III, 20-29 MIN: ICD-10-PCS | Mod: S$GLB,,, | Performed by: OBSTETRICS & GYNECOLOGY

## 2023-04-11 PROCEDURE — 3008F BODY MASS INDEX DOCD: CPT | Mod: CPTII,S$GLB,, | Performed by: OBSTETRICS & GYNECOLOGY

## 2023-04-11 PROCEDURE — 99213 OFFICE O/P EST LOW 20 MIN: CPT | Mod: S$GLB,,, | Performed by: OBSTETRICS & GYNECOLOGY

## 2023-04-11 PROCEDURE — 3078F DIAST BP <80 MM HG: CPT | Mod: CPTII,S$GLB,, | Performed by: OBSTETRICS & GYNECOLOGY

## 2023-04-11 PROCEDURE — 99999 PR PBB SHADOW E&M-EST. PATIENT-LVL III: ICD-10-PCS | Mod: PBBFAC,,, | Performed by: OBSTETRICS & GYNECOLOGY

## 2023-04-11 RX ORDER — AMITRIPTYLINE HYDROCHLORIDE 10 MG/1
10 TABLET, FILM COATED ORAL NIGHTLY PRN
Qty: 30 TABLET | Refills: 11 | Status: SHIPPED | OUTPATIENT
Start: 2023-04-11 | End: 2023-09-26 | Stop reason: SINTOL

## 2023-04-11 NOTE — PATIENT INSTRUCTIONS
IC  --Prelief to help alkinize the urine:   --Prelief Tablets : Each tablet contains 345 mg calcium glycerophosphate (65 mg of elemental calcium). The tablets also contain 0.25% magnesium  stearate as a processing aid. Two tablets are equivalent to 690 mg calcium glycerophosphate (130mg of elemental calcium).   --Prelief Powder : Each ¼ teaspoon usage of powder is comparable to two tablets. The powder dissolves rapidly in food or non-alcoholic beverages.  Tablets are recommended for taking with alcoholic beverages   --Usage:     --Tablets: 2-3 tablets, 2 times a day.    --Powder: ¼ teaspoon of powder 2 times a day. More can be used when needed  --The IC diet:  https://www.ichelp.org/wp-content/uploads/2015/07/food-list.pdf   -- Continue Hydroxyzine 10 mg at night for relief of bladder pain- will add colace twice a day if no improvement will stop hydroxyzine and start elavil 10 mg at night.   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

## 2023-04-11 NOTE — PROGRESS NOTES
Subjective:       Patient ID: Rosa Maria Kay is a 48 y.o. female.    Chief Complaint:  Follow-up    History of Present Illness  Medication Refill  Pertinent negatives include no abdominal pain, chills, coughing, diaphoresis, fatigue, fever, nausea, rash or vomiting.   Follow-up  Pertinent negatives include no abdominal pain, chills, coughing, diaphoresis, fatigue, fever, nausea, rash or vomiting.  46 Y O F P2   has a past medical history of Abnormal Pap smear of cervix (2017), Encounter for Essure implantation (2017), HPV in female (2017), Interstitial cystitis, and Puberty, precocious (1984). initially Diagnosed in 2007 by  Dr. Pena who the patient continued with until she retired.  Has seen Dr. Flood since 2016 with management of medications  which has worked: Elmiron three times a day,  flovoxate once a day, uribel daily. She also takes opinorol for restless leg syndrome.     Taking Urogesic blue c at 7:30 pm   Uro MP- 5:30 pm   Hydroxyzine 10 mg     Tried : Elavil not helpful reactions severe GI discomfort   Vaginal suppositories 2012 unclear of exact compounding      Has not tried:   Gabapentin  Bladder instillation   hydrodistention     Symptoms are mostly when she lays down when her body is relax, no burning with urination, feels a sense of relief after she voids.   Interval  HP since the last visit: last seen one week ago.   Feeling much better.  IC: pain: much improved, urinary urgency and night time urination also improved  With Hydroxyzine: Night time urination now 1-2 night.   Has been doing the 1/4 valium 10 mg tab in vagina also.       1/17/2023  Patient with bothersome pelvic pressure  Discontinued the veiscare due to side effects   Pelvic floor PT has been helpful   Using uribel and hydroxyzine which are helpful     4/11/2023  Not using the hydrooxyine due to stomache- discomfort severe cosntipation    [facility]     Question 4/9/2020  9:39 AM CDT - Filed by Patient on 4/9/2020  "  General Urogynecology: Are you experiencing the following?    Dysuria (painful urination) No   Nocturia:  waking up at night to empty your bladder  Yes   If you answered yes to the previous question, how many times does this happen per night? 3-4   Enuresis (urine loss during sleep) No   Dribbling urine after you urinate No   Hematuria (urine appears red) No   Type of stream Weak   Urinary frequency: How often a day are you going to the bathroom per day?  More than 15   Urinary Tract Infections: How many Urinary Tract Infections have you had in the past year? I have not had a UTI in the past year   If you have had a UTI in the past year, what treatments have you had so far?  I have not had a UTI in the past year   Urinary Incontinence (General): Are you experiencing the following?    Past consultation for incontinence: Have you ever seen someone for the evaluation of incontinence? No   If you answered yes to the previous question, please select all the therapies you have tried.  None of the above   Please note the effectiveness of the therapies. Ineffective   Need to wear protection to keep clothes dry  No   If you answered yes to the previous question, please giselle the protection you use.  None   If you wear protection, how much wetness is typically on each pad? N/a- I do not wear protection   If you wear protection, how often do you have to change per day, if applicable?  0   Stress Symptoms: Are you experiencing the following?    Leakage of urine with cough, laugh and/or sneeze No   If you answered yes to the previous question, what is the frequency in days, weeks and/or months? Never   Leakage of urine with sex No   Leakage of urine with bending/ lifting No   Leakage of urine with briskly walking or jogging No   If you lose urine for any other reason not previously mentioned, please note it below, if applicable.     Urge Symptoms: Are you experiencing the following?    Urgency ("got to go" feeling) Yes   Urge: " "How frequently do you feel an urge to urinate (feeling like you "gotta go" to the bathroom and can't wait) Never   Do you experience a leakage of urine when you have a feeling of urgency?  No   Leakage of urine when unaware No   Past use of anticholinergics (medications used to treat overactive bladder) No   If you answered yes to the previous question, please giselle the anticholinergics you have used:     Have you ever used Mirbetriq (aka Mirabegron)?  No   Prolapse Symptoms: Are you experiencing any of the following?     Falling out/ Bulging/ Heaviness in the vagina No   Vaginal/ Abdominal Pain/ Pressure No   Need to strain/ Push to void No   Need to wait on the toilet before you void No   Unusual position to urinate (using your hands to push back the vaginal bulge) No   Sensation of incomplete emptying No   Past use of pessary device No   If you answered yes to the previous question, please list the devices you have used below.     Bowel Symptoms: Are you experiencing any of the following?    Constipation No   Diarrhea  No   Hematochezia (bloody stool) No   Incomplete evacuation of stool No   Involuntary loss of formed stool No   Fecal smearing/urgency No   Involuntary loss of gas No   Vaginal Symptoms: Are you experiencing any of the following?     Abnormal vaginal bleeding  No   Vaginal dryness No   Sexually active  Yes   Dyspareunia (painful intercourse) No   Estrogen use       GYN & OB History  No LMP recorded. Patient is premenopausal.   Date of Last Pap: 2019    OB History    Para Term  AB Living   2 2 1 1   2   SAB IAB Ectopic Multiple Live Births           2      # Outcome Date GA Lbr Syed/2nd Weight Sex Delivery Anes PTL Lv   2 Term  40w0d  3.43 kg (7 lb 9 oz) M    VANESA   1   34w0d  3.005 kg (6 lb 10 oz) M CS-LTranv   VANESA     OB  Largest: c/s ;   Forceps: no  Episiotomy:      GYN  Menarche: 14  Menstrual cycle: 28/   Menopause: no  Hysterectomy: no  Ovaries: " prersent    Past Medical History:   Diagnosis Date    Abnormal Pap smear of cervix 2017    CKC done (Dr Cote @ [facility]) - normla paps after    Encounter for Essure implantation     HPV in female 2017    Interstitial cystitis     Dr. Wheeler    Puberty, precocious        Past Surgical History:   Procedure Laterality Date     SECTION      COLD KNIFE CONIZATION OF CERVIX      w/ ESSURE     COLONOSCOPY  2020    NORMAL. Repat 3 yrs --  DR Jessica Vogt     COLONOSCOPY N/A 2020    Procedure: COLONOSCOPY;  Surgeon: Jessica Vogt MD;  Location: [facility];  Service: Endoscopy;  Laterality: N/A;  CV-19 ORDERED    VAGINAL BIRTH AFTER  SECTION         Current Outpatient Medications:     conjugated estrogens (PREMARIN) vaginal cream, 1 g with applicator or dime-sized amt with finger in vagina nightly x 2 weeks, then twice a week thereafter, Disp: 45 g, Rfl: 12    DAILY MULTI-VITAMIN ORAL, Take by mouth., Disp: , Rfl:     methen-sod phos-meth blue-hyos (UROGESIC-BLUE/URYL) 81.6-40.8-0.12 mg Tab, TAKE ONE TABLET BY MOUTH EVERY 6 HOURS AS NEEDED (MAXIMUM OF 5 DAYS) FOR BLADDER PAIN, Disp: 60 tablet, Rfl: 0    progesterone (PROMETRIUM) 100 MG capsule, Take 1 capsule by mouth 30-60 minutes before bed every night, Disp: 90 capsule, Rfl: 3    rOPINIRole (REQUIP) 1 MG tablet, TAKE ONE TABLET BY MOUTH TWICE DAILY, Disp: 60 tablet, Rfl: 11    TAZORAC 0.05 % Crea cream, Apply 1 application topically nightly., Disp: , Rfl:     amitriptyline (ELAVIL) 10 MG tablet, Take 1 tablet (10 mg total) by mouth nightly as needed for Insomnia., Disp: 30 tablet, Rfl: 11    trospium (SANCTURA) 20 mg Tab tablet, Take 1 tablet (20 mg total) by mouth 2 (two) times daily., Disp: 60 tablet, Rfl: 11      Review of Systems  Review of Systems   Constitutional: Negative.  Negative for activity change, appetite change, chills, diaphoresis, fatigue, fever and unexpected weight change.   HENT: Negative.     Eyes:  Negative.    Respiratory: Negative.  Negative for cough.    Cardiovascular: Negative.    Gastrointestinal:  Negative for abdominal distention, abdominal pain, anal bleeding, blood in stool, constipation, diarrhea, nausea, rectal pain and vomiting.   Endocrine: Negative.    Genitourinary:  Positive for dysuria and urgency. Negative for decreased urine volume, difficulty urinating, dyspareunia, enuresis, flank pain, frequency, genital sores, hematuria, menstrual problem, pelvic pain, vaginal bleeding, vaginal discharge and vaginal pain.        Night time urination    Musculoskeletal:  Negative for back pain.   Skin:  Negative for color change, pallor, rash and wound.   Allergic/Immunologic: Negative for environmental allergies, food allergies and immunocompromised state.   Hematological:  Negative for adenopathy. Does not bruise/bleed easily.   Psychiatric/Behavioral:  Negative for agitation, behavioral problems, confusion and sleep disturbance.          Objective:     Physical Exam   Constitutional: She is oriented to person, place, and time. She appears well-developed and well-nourished.   HENT:   Head: Normocephalic and atraumatic.   Eyes: Conjunctivae and EOM are normal.   Cardiovascular: Normal rate, regular rhythm, S1 normal, S2 normal, normal heart sounds and intact distal pulses.   Pulmonary/Chest: Effort normal and breath sounds normal. No respiratory distress. She has no wheezes. She exhibits no tenderness.   Abdominal: Soft. Bowel sounds are normal. She exhibits no distension and no mass. There is no splenomegaly or hepatomegaly. There is no abdominal tenderness. There is no rigidity, no rebound and no guarding. No hernia.   Genitourinary: Pelvic exam was performed with patient supine. Rectum normal, vagina normal, cervix normal, skenes normal and bartholins normal. Right labia normal and left labia normal. Urethra exhibits no urethral caruncle, no urethral diverticulum and no urethral mass. Right bartholin  is not enlarged and not tender. Left bartholin is not enlarged and not tender. Rectal exam shows resting tone normal and active tone normal. Rectal exam shows no external hemorrhoid, no fissure, no tenderness, anal tone normal and no dovetailing. Guaiac negative stool. There is lavator tenderness in the vagina. No foreign body, tenderness, bleeding, unspecified prolapse of vaginal walls, fistula or mesh exposure in the vagina. Right adnexum displays no mass and no tenderness. Left adnexum displays no mass and no tenderness. Cervix exhibits no motion tenderness and no discharge. Uterus is tender. Uterus is not experiencing uterine prolapse.   PVR: 10 ML  Empty cough stress test: Negative.  Kegel: 1/5    POP-Q  Aa: -1 Ba: -1 C: -5   GH: 3 PB: 2 TVL: 8   Ap: -2 Bp: -2 D: -6                      Genitourinary Comments: No bladder base tenderness  Levators not tender      Musculoskeletal:         General: Normal range of motion.      Cervical back: Normal range of motion and neck supple.   Neurological: She is alert and oriented to person, place, and time. She has normal strength and normal reflexes. Cranial nerves II through XII intact. No cranial nerve deficit.   Skin: Skin is warm and dry.   Psychiatric: She has a normal mood and affect. Her speech is normal and behavior is normal. Judgment and thought content normal.        Assessment:        1. Interstitial cystitis                   Plan:           IC  --Prelief to help alkinize the urine:   --Prelief Tablets : Each tablet contains 345 mg calcium glycerophosphate (65 mg of elemental calcium). The tablets also contain 0.25% magnesium  stearate as a processing aid. Two tablets are equivalent to 690 mg calcium glycerophosphate (130mg of elemental calcium).   --Prelief Powder : Each ¼ teaspoon usage of powder is comparable to two tablets. The powder dissolves rapidly in food or non-alcoholic beverages.  Tablets are recommended for taking with alcoholic  beverages   --Usage:     --Tablets: 2-3 tablets, 2 times a day.    --Powder: ¼ teaspoon of powder 2 times a day. More can be used when needed  --The IC diet:  https://www.ichelp.org/wp-content/uploads/2015/07/food-list.pdf   -- Continue Hydroxyzine 10 mg at night for relief of bladder pain- will add colace twice a day if no improvement will stop hydroxyzine and start elavil 10 mg at night.   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     Yeny Silva DO  Female Pelvic Medicine and Reconstructive Surgery  [facility] [place], [place]

## 2023-05-06 DIAGNOSIS — N30.10 IC (INTERSTITIAL CYSTITIS): ICD-10-CM

## 2023-05-08 ENCOUNTER — PATIENT MESSAGE (OUTPATIENT)
Dept: REHABILITATION | Facility: OTHER | Age: 48
End: 2023-05-08
Payer: COMMERCIAL

## 2023-05-08 RX ORDER — URINARY ANTISEPTIC ANTISPASMODIC 81.6; 40.8; 10.8; .12 MG/1; MG/1; MG/1; MG/1
1 TABLET ORAL 4 TIMES DAILY PRN
Qty: 60 TABLET | Refills: 11 | Status: SHIPPED | OUTPATIENT
Start: 2023-05-08

## 2023-05-09 ENCOUNTER — CLINICAL SUPPORT (OUTPATIENT)
Dept: REHABILITATION | Facility: OTHER | Age: 48
End: 2023-05-09
Attending: OBSTETRICS & GYNECOLOGY
Payer: COMMERCIAL

## 2023-05-09 DIAGNOSIS — M79.18 MYALGIA OF PELVIC FLOOR: Primary | ICD-10-CM

## 2023-05-09 PROCEDURE — 97110 THERAPEUTIC EXERCISES: CPT

## 2023-05-09 PROCEDURE — 97140 MANUAL THERAPY 1/> REGIONS: CPT

## 2023-05-09 NOTE — PROGRESS NOTES
"  Pelvic Health Physical Therapy   Recertification Note     Name: Rosa Maria [facility]  Clinic Number: [id number]    Therapy Diagnosis:   Encounter Diagnosis   Name Primary?    Myalgia of pelvic floor Yes       Physician: Yeny Silva,*    Visit Date: 5/9/2023    Physician Orders: PT Eval and Treat  Medical Diagnosis from Referral: interstitial cystitis  Evaluation Date: 1/20/2023  Authorization Period Expiration: 12 visits  Plan of Care Expiration: 4/18/23 to 7/16/23  Visit # / Visits authorized: 7/12    FOTO: 3 completed 5/9/23     Time In: 109  Time Out: 204  Total Appointment Time (timed & untimed codes): 55 minutes     Precautions: universal  Subjective   Pt reports: No big flares. Constipation is much better. Thinks work done last time was very beneficial or was coincidence.Stopped taking Happy Fiber, but taking loose psyllium husk in apple juice every couple days. Gyne wants her to do HRTwhich thinks will help with sleep. Has not taken Elavil because it makes her constipated. Last night was terrible night sleep but because her hip hurts. Had DXA and has osteopenia in spine.     She was compliant with home exercise program.  Response to previous treatment: No adverse effects   Functional change: Decreased urgency    Pain: 0/10  Location:     Pts goals: to get a refresher and make sure she is doing her exercises correctly and to minimize irritation  To be able to return to intercourse without discomfort after    Objective   Pt verbally consents to intravaginal treatment today.  Signed consent form already on file.    Rosa Maria received therapeutic exercises to develop  for 30 minutes including:   PPU - discussion of increased frequency, expectations with centralization, standing alternative when cannot do prone  Bridge w/Green band x15,3"  Isi, RTB, with TrA activation  Glut min exercise  SL hip abd, 2 x 10    Not performed today:  Pelvic tilts  PFME - contractions focus on 3 layers and complete " "relaxation, breathing  TrA + BKFO w/YTB x10  Supine hip flexor stretch + DM 1 min  Open book x10,3"  Cat cow x15  Piriformis stretch 1 min        Rosa Maria received the following manual therapy techniques: for 25 minutes including:   MFR pelvic floor mm    Rosa Maria participated in neuromuscular re-education activities to develop for 00 minutes including:   Not performed today  360 breathing   Pelvic tilts - focus on isolation of pelvic motion from ribs    Rosa Maria participated in dynamic functional therapeutic activities to improve functional performance for 00 minutes, including:    Not performed today:  Breathing mechanics with BM  Urge suppression-roll for control  Discussion of not squatting at public restrooms  Double voiding techniques  Pelvic wand training - educated on care of wand, use of lubricant, how to insert, different techniques to use. Therapist inserted and demonstrated appropriate pressure and how to guide handle, then had pt hold handle and control wand while giving feedback    Home Exercises Provided and Patient Education Provided     Education provided:   - bladder retraining and diaphragmatic breathing  Discussed progression of plan of care with patient; educated pt in activity modification; reviewed HEP with pt. Pt demonstrated and verbalized understanding of all instruction and was provided with a handout of HEP (see Patient Instructions).    Written Home Exercises Provided: yes.  Exercises were reviewed and Rosa Maria was able to demonstrate them prior to the end of the session.  Rosa Maria demonstrated good  understanding of the education provided.     See EMR under Patient Instructions for exercises provided     Assessment   L buttock pain may be centralizing symptoms, educated on centralization principles with MDT techniques. Emphasized frequency of performing PPU and can try loaded extension when unable to lie down. Pelvic floor with minimal tension or tenderness and excellent mm function. With hip exercises " difficulty isolating glut med on L, SL hip abd most effective. She will add at home as well as SL bridge.     Rosa Maria is progressing well towards her goals.   Pt prognosis is Excellent.     Pt will continue to benefit from skilled outpatient physical therapy to address the deficits listed in the problem list box on initial evaluation, provide pt/family education and to maximize pt's level of independence in the home and community environment.     Pt's spiritual, cultural and educational needs considered and pt agreeable to plan of care and goals.     Anticipated barriers to physical therapy: None    Goals: Short Term Goals: 4 weeks   Pt indep in HEP MET  Pt able to wait at least 2 hours between trips to the bathroom for improved participation in ADLs MET  Pt demo pelvic floor mm strength at least 3+/5 for improved support of pelvic organs MET  Pt indep in pelvic floor relaxation strategies MET  Pt indep in use of pelvic wand  Pt indep in urge suppression techniques MET      Long Term Goals: 8 weeks   Pt indep in progressive HEP PROGRESSING  Pt reports 0 episodes of leakage in at least 2 weeks for improved ADL participation MET  Pt able to wait at least 2.5-4 hours between trips to the bathroom PROGRESSING  Nocturia no more than 1x/night for improved quality of sleep MET  Pt reports able to have painfree intercourse with no increased discomfort afterwards for improved participation in ADLs and improved intimacy with partner   Pt demo pelvic floor mm strength at least 4/5 for improved support of pelvic organs PROGRESSING  Plan   Plan of care Certification: 4/18/23 to 7/16/23     Outpatient Physical Therapy 1 times weekly for 12 weeks to include the following interventions: therapeutic exercises, therapeutic activity, neuromuscular re-education, manual therapy, patient/family education and self care/home management     Next visit: manual - FDN pubic symphysis and lower abds, CPA upper lumbar? progress core and  hips      Missy Lynn, PT

## 2023-05-09 NOTE — PATIENT INSTRUCTIONS
Home Program 5/9/23     Sidelying leg lifts - lay on side, bottom leg bent, top leg straight. Top of pelvis rolled forward and leg back in line with trunk. Lift and lower top leg. 3 sets of 10      Single leg bridge - laying on back with knees bent, one leg straight, lift hips up off of table into bridge keeping pelvis level. 2-3 sets of 10.

## 2023-05-09 NOTE — PLAN OF CARE
"  Pelvic Health Physical Therapy   Recertification Note     Name: Rosa Maria [facility]  Clinic Number: [id number]    Therapy Diagnosis:   Encounter Diagnosis   Name Primary?    Myalgia of pelvic floor Yes       Physician: Yeny Silva,*    Visit Date: 5/9/2023    Physician Orders: PT Eval and Treat  Medical Diagnosis from Referral: interstitial cystitis  Evaluation Date: 1/20/2023  Authorization Period Expiration: 12 visits  Plan of Care Expiration: 4/18/23 to 7/16/23  Visit # / Visits authorized: 7/12    FOTO: 3 completed 5/9/23     Time In: 109  Time Out: 204  Total Appointment Time (timed & untimed codes): 55 minutes     Precautions: universal  Subjective   Pt reports: No big flares. Constipation is much better. Thinks work done last time was very beneficial or was coincidence.Stopped taking Happy Fiber, but taking loose psyllium husk in apple juice every couple days. Gyne wants her to do HRTwhich thinks will help with sleep. Has not taken Elavil because it makes her constipated. Last night was terrible night sleep but because her hip hurts. Had DXA and has osteopenia in spine.     She was compliant with home exercise program.  Response to previous treatment: No adverse effects   Functional change: Decreased urgency    Pain: 0/10  Location:     Pts goals: to get a refresher and make sure she is doing her exercises correctly and to minimize irritation  To be able to return to intercourse without discomfort after    Objective   Pt verbally consents to intravaginal treatment today.  Signed consent form already on file.    Rosa Maria received therapeutic exercises to develop  for 30 minutes including:   PPU - discussion of increased frequency, expectations with centralization, standing alternative when cannot do prone  Bridge w/Green band x15,3"  Isi, RTB, with TrA activation  Glut min exercise  SL hip abd, 2 x 10    Not performed today:  Pelvic tilts  PFME - contractions focus on 3 layers and complete " "relaxation, breathing  TrA + BKFO w/YTB x10  Supine hip flexor stretch + DM 1 min  Open book x10,3"  Cat cow x15  Piriformis stretch 1 min        Rosa Maria received the following manual therapy techniques: for 25 minutes including:   MFR pelvic floor mm    Rosa Maria participated in neuromuscular re-education activities to develop for 00 minutes including:   Not performed today  360 breathing   Pelvic tilts - focus on isolation of pelvic motion from ribs    Rosa Maria participated in dynamic functional therapeutic activities to improve functional performance for 00 minutes, including:    Not performed today:  Breathing mechanics with BM  Urge suppression-roll for control  Discussion of not squatting at public restrooms  Double voiding techniques  Pelvic wand training - educated on care of wand, use of lubricant, how to insert, different techniques to use. Therapist inserted and demonstrated appropriate pressure and how to guide handle, then had pt hold handle and control wand while giving feedback    Home Exercises Provided and Patient Education Provided     Education provided:   - bladder retraining and diaphragmatic breathing  Discussed progression of plan of care with patient; educated pt in activity modification; reviewed HEP with pt. Pt demonstrated and verbalized understanding of all instruction and was provided with a handout of HEP (see Patient Instructions).    Written Home Exercises Provided: yes.  Exercises were reviewed and Rosa Maria was able to demonstrate them prior to the end of the session.  Rosa Maria demonstrated good  understanding of the education provided.     See EMR under Patient Instructions for exercises provided     Assessment   L buttock pain may be centralizing symptoms, educated on centralization principles with MDT techniques. Emphasized frequency of performing PPU and can try loaded extension when unable to lie down. Pelvic floor with minimal tension or tenderness and excellent mm function. With hip exercises " difficulty isolating glut med on L, SL hip abd most effective. She will add at home as well as SL bridge.     Pt is doing really well, returning today with no urinary symptoms, only c/o of buttock pain. Pelvic floor in excellent condition. One additional visits scheduled in one month to ensure good transition to indep mgmt but if still doing well at that time will be discharged.   Rosa Maria is progressing well towards her goals.   Pt prognosis is Excellent.     Pt will continue to benefit from skilled outpatient physical therapy to address the deficits listed in the problem list box on initial evaluation, provide pt/family education and to maximize pt's level of independence in the home and community environment.     Pt's spiritual, cultural and educational needs considered and pt agreeable to plan of care and goals.     Anticipated barriers to physical therapy: None    Goals: Short Term Goals: 4 weeks   Pt indep in HEP MET  Pt able to wait at least 2 hours between trips to the bathroom for improved participation in ADLs MET  Pt demo pelvic floor mm strength at least 3+/5 for improved support of pelvic organs MET  Pt indep in pelvic floor relaxation strategies MET  Pt indep in use of pelvic wand  Pt indep in urge suppression techniques MET      Long Term Goals: 8 weeks   Pt indep in progressive HEP PROGRESSING  Pt reports 0 episodes of leakage in at least 2 weeks for improved ADL participation MET  Pt able to wait at least 2.5-4 hours between trips to the bathroom PROGRESSING  Nocturia no more than 1x/night for improved quality of sleep MET  Pt reports able to have painfree intercourse with no increased discomfort afterwards for improved participation in ADLs and improved intimacy with partner   Pt demo pelvic floor mm strength at least 4/5 for improved support of pelvic organs PROGRESSING  Plan   Plan of care Certification: 4/18/23 to 7/16/23     Outpatient Physical Therapy 1 times weekly for 12 weeks to include the  following interventions: therapeutic exercises, therapeutic activity, neuromuscular re-education, manual therapy, patient/family education and self care/home management     Next visit: manual - FDN pubic symphysis and lower abds, CPA upper lumbar? progress core and hips      Missy Lynn, PT

## 2023-06-05 LAB — CRC RECOMMENDATION EXT: NORMAL

## 2023-06-08 ENCOUNTER — PATIENT OUTREACH (OUTPATIENT)
Dept: ADMINISTRATIVE | Facility: HOSPITAL | Age: 48
End: 2023-06-08
Payer: COMMERCIAL

## 2023-06-08 NOTE — PROGRESS NOTES
Health Maintenance Due   Topic Date Due    HIV Screening  Never done    COVID-19 Vaccine (6 - Moderna series) 10/20/2022     Chart review done. HM updated. Immunizations reviewed & updated. Care Everywhere updated.

## 2023-06-09 ENCOUNTER — OFFICE VISIT (OUTPATIENT)
Dept: PAIN MEDICINE | Facility: CLINIC | Age: 48
End: 2023-06-09
Payer: COMMERCIAL

## 2023-06-09 VITALS
OXYGEN SATURATION: 98 % | DIASTOLIC BLOOD PRESSURE: 62 MMHG | SYSTOLIC BLOOD PRESSURE: 97 MMHG | HEART RATE: 94 BPM | RESPIRATION RATE: 18 BRPM

## 2023-06-09 DIAGNOSIS — M51.36 DDD (DEGENERATIVE DISC DISEASE), LUMBAR: Primary | ICD-10-CM

## 2023-06-09 DIAGNOSIS — M47.816 LUMBAR SPONDYLOSIS: ICD-10-CM

## 2023-06-09 PROCEDURE — 3078F PR MOST RECENT DIASTOLIC BLOOD PRESSURE < 80 MM HG: ICD-10-PCS | Mod: CPTII,S$GLB,, | Performed by: PAIN MEDICINE

## 2023-06-09 PROCEDURE — 3074F SYST BP LT 130 MM HG: CPT | Mod: CPTII,S$GLB,, | Performed by: PAIN MEDICINE

## 2023-06-09 PROCEDURE — 1160F RVW MEDS BY RX/DR IN RCRD: CPT | Mod: CPTII,S$GLB,, | Performed by: PAIN MEDICINE

## 2023-06-09 PROCEDURE — 99213 OFFICE O/P EST LOW 20 MIN: CPT | Mod: S$GLB,,, | Performed by: PAIN MEDICINE

## 2023-06-09 PROCEDURE — 3078F DIAST BP <80 MM HG: CPT | Mod: CPTII,S$GLB,, | Performed by: PAIN MEDICINE

## 2023-06-09 PROCEDURE — 99999 PR PBB SHADOW E&M-EST. PATIENT-LVL III: ICD-10-PCS | Mod: PBBFAC,,, | Performed by: PAIN MEDICINE

## 2023-06-09 PROCEDURE — 1160F PR REVIEW ALL MEDS BY PRESCRIBER/CLIN PHARMACIST DOCUMENTED: ICD-10-PCS | Mod: CPTII,S$GLB,, | Performed by: PAIN MEDICINE

## 2023-06-09 PROCEDURE — 1159F MED LIST DOCD IN RCRD: CPT | Mod: CPTII,S$GLB,, | Performed by: PAIN MEDICINE

## 2023-06-09 PROCEDURE — 99213 PR OFFICE/OUTPT VISIT, EST, LEVL III, 20-29 MIN: ICD-10-PCS | Mod: S$GLB,,, | Performed by: PAIN MEDICINE

## 2023-06-09 PROCEDURE — 3074F PR MOST RECENT SYSTOLIC BLOOD PRESSURE < 130 MM HG: ICD-10-PCS | Mod: CPTII,S$GLB,, | Performed by: PAIN MEDICINE

## 2023-06-09 PROCEDURE — 1159F PR MEDICATION LIST DOCUMENTED IN MEDICAL RECORD: ICD-10-PCS | Mod: CPTII,S$GLB,, | Performed by: PAIN MEDICINE

## 2023-06-09 PROCEDURE — 99999 PR PBB SHADOW E&M-EST. PATIENT-LVL III: CPT | Mod: PBBFAC,,, | Performed by: PAIN MEDICINE

## 2023-06-09 NOTE — PROGRESS NOTES
Subjective:     Patient ID: Rosa Maria Kay is a 48 y.o. female    Chief Complaint: Leg Pain (Left sided stretching pain)      Referred by: No ref. provider found      HPI:    Interval History (6/9/23):  She returns today for follow up.  She reports that her pain has significantly improved since last encounter.  Has been attending physical therapy with good improvement.  Still has some pain at times, but can do essentially all of her normal activities unimpeded.  She does not feel that additional treatment is needed at this time.        Initial Encounter (3/20/23):  Rosa Maria Kay is a 48 y.o. female who presents today with left-sided lower extremity pain.  This pain started in November of 2022.  Patient states that she went to kick something had sudden onset of pain.  Pain is located in the left posterior proximal thigh it will extend along the posterior thigh.  She does report some paresthesias in the left lateral leg at times.  She denies having any significant low back pain.  She denies any focal weakness or bowel bladder dysfunction.  Pain is constant and worsened with activity.  Patient performs regular stretching every day and has not noticed any improvement..   This pain is described in detail below.    Physical Therapy:  Yes.    Non-pharmacologic Treatment:  Rest helps         TENS?  No    Pain Medications:         Currently taking:  Amitriptyline    Has tried in the past:  NSAIDs (GI upset), Tylenol, gabapentin    Has not tried:  Opioids, Muscle relaxants, SNRIs, topical creams    Blood thinners:  None    Interventional Therapies:  None    Relevant Surgeries:  None    Affecting sleep?  Yes    Affecting daily activities? yes    Depressive symptoms? no          SI/HI? No    Work status: Employed    Pain Scores:    Best:       0/10  Worst:     7/10  Usually:   1/10  Today:    0/10    Pain Disability Index  Family/Home Responsibilities:: 0  Recreation:: 1  Social Activity:: 0  Occupation:: 1  Sexual  Behavior:: 0  Self Care:: 0  Life-Support Activities:: 1  Pain Disability Index (PDI): 3    Review of Systems   Constitutional:  Negative for activity change, appetite change, chills, fatigue, fever and unexpected weight change.   HENT:  Negative for hearing loss.    Eyes:  Negative for visual disturbance.   Respiratory:  Negative for chest tightness and shortness of breath.    Cardiovascular:  Negative for chest pain.   Gastrointestinal:  Negative for abdominal pain, constipation, diarrhea, nausea and vomiting.   Genitourinary:  Negative for difficulty urinating.   Musculoskeletal:  Positive for myalgias. Negative for back pain, gait problem and neck pain.   Skin:  Negative for rash.   Neurological:  Negative for dizziness, weakness, light-headedness, numbness and headaches.   Psychiatric/Behavioral:  Negative for hallucinations, sleep disturbance and suicidal ideas. The patient is not nervous/anxious.      Past Medical History:   Diagnosis Date    Abnormal Pap smear of cervix 2017    CKC done (Dr Cote @ [facility]) - normla paps after    Encounter for Essure implantation 2017    HPV in female 2017    Interstitial cystitis     Dr. Wheeler    Puberty, precocious        Past Surgical History:   Procedure Laterality Date     SECTION      COLD KNIFE CONIZATION OF CERVIX      w/ ESSURE     COLONOSCOPY  2020    NORMAL. Repat 3 yrs --  DR Jessica Vogt     COLONOSCOPY N/A 2020    Procedure: COLONOSCOPY;  Surgeon: Jessica Vogt MD;  Location: [facility];  Service: Endoscopy;  Laterality: N/A;  CV-19 ORDERED    VAGINAL BIRTH AFTER  SECTION         Social History     Socioeconomic History    Marital status:    Tobacco Use    Smoking status: Never    Smokeless tobacco: Never   Substance and Sexual Activity    Alcohol use: Yes     Comment: Occational    Drug use: No    Sexual activity: Yes     Partners: Male     Birth control/protection: Surgical     Comment: :      NASREEN  2017     Social Determinants of Health     Financial Resource Strain: Low Risk     Difficulty of Paying Living Expenses: Not hard at all   Food Insecurity: No Food Insecurity    Worried About Running Out of Food in the Last Year: Never true    Ran Out of Food in the Last Year: Never true   Transportation Needs: No Transportation Needs    Lack of Transportation (Medical): No    Lack of Transportation (Non-Medical): No   Physical Activity: Sufficiently Active    Days of Exercise per Week: 7 days    Minutes of Exercise per Session: 50 min   Stress: Stress Concern Present    Feeling of Stress : To some extent   Social Connections: Unknown    Frequency of Communication with Friends and Family: Patient refused    Frequency of Social Gatherings with Friends and Family: Patient refused    Active Member of Clubs or Organizations: No    Attends Club or Organization Meetings: Never    Marital Status:    Housing Stability: Low Risk     Unable to Pay for Housing in the Last Year: No    Number of Places Lived in the Last Year: 1    Unstable Housing in the Last Year: No       Review of patient's allergies indicates:  No Known Allergies    Current Outpatient Medications on File Prior to Visit   Medication Sig Dispense Refill    amitriptyline (ELAVIL) 10 MG tablet Take 1 tablet (10 mg total) by mouth nightly as needed for Insomnia. 30 tablet 11    DAILY MULTI-VITAMIN ORAL Take by mouth.      methen-sod phos-meth blue-hyos (UROGESIC-BLUE/URYL) 81.6-40.8-0.12 mg Tab Take 1 tablet by mouth 4 (four) times daily as needed (bladder pain). 60 tablet 11    rOPINIRole (REQUIP) 1 MG tablet TAKE ONE TABLET BY MOUTH TWICE DAILY 60 tablet 11    TAZORAC 0.05 % Crea cream Apply 1 application topically nightly.       No current facility-administered medications on file prior to visit.       Objective:      BP 97/62 (BP Location: Right arm, Patient Position: Sitting, BP Method: Medium (Automatic))   Pulse 94   Resp 18   LMP  12/01/2021   SpO2 98%     Exam:  GEN:  Well developed, well nourished.  No acute distress.   HEENT:  No trauma.  Mucous membranes moist.  Nares patent bilaterally.  PSYCH: Normal affect. Thought content appropriate.  CHEST:  Breathing symmetric.  No audible wheezing.  ABD: Soft, non-distended.  SKIN:  Warm, pink, dry.  No rash on exposed areas.    EXT:  No cyanosis, clubbing, or edema.  No color change or changes in nail or hair growth.  NEURO/MUSCULOSKELETAL:  Fully alert, oriented, and appropriate. Speech normal kirill. No cranial nerve deficits.   Gait:  Normal.  No focal motor deficits.         Imaging:    Narrative & Impression    EXAMINATION:  XR LUMBAR SPINE AP AND LAT WITH FLEX/EXT     CLINICAL HISTORY:  Other intervertebral disc degeneration, lumbar region     TECHNIQUE:  AP and lateral views as well as lateral flexion and extension images are performed through the lumbar spine.     COMPARISON:  None     FINDINGS:  Five non rib-bearing lumbar type vertebral bodies are present.  There is 4 mm of anterolisthesis of L5 on S1, unchanged between neutral, extension and flexion views.  Vertebral body heights are maintained.  Intervertebral disc heights are maintained.  Facet joints are maintained.  Essure devices are seen in the pelvis.     Impression:     As above        Electronically signed by: Deborah Whitney  Date:                                            03/29/2023  Time:                                           10:39       Assessment:       Encounter Diagnoses   Name Primary?    DDD (degenerative disc disease), lumbar Yes    Lumbar spondylosis            Plan:       Rosa Maria was seen today for leg pain.    Diagnoses and all orders for this visit:    DDD (degenerative disc disease), lumbar    Lumbar spondylosis          Rosa Maria Kay is a 48 y.o. female with chronic left lower extremity pain.  Exact etiology is unclear.  May be related to hamstring strain.  Does have some intermittent paresthesias of  the left lateral leg which may indicate lumbar radiculopathy though not having any back pain.  Physical examination not specific for any particular etiology.  Good improvement physical therapy.    Pertinent imaging studies reviewed by me. Imaging results were discussed with patient.  Continue physical therapy/home exercise program.  No further treatment needed at this time.  Patient improving well.  Return to clinic as needed.  If symptoms persist or worsen.  May consider lumbar MRI.            This note was created by combination of typed  and M-Modal dictation. Transcription and phonetic errors may be present.  If there are any questions, please contact me.

## 2023-07-04 ENCOUNTER — PATIENT MESSAGE (OUTPATIENT)
Dept: UROGYNECOLOGY | Facility: CLINIC | Age: 48
End: 2023-07-04
Payer: COMMERCIAL

## 2023-07-04 DIAGNOSIS — R10.2 PELVIC PRESSURE IN FEMALE: Primary | ICD-10-CM

## 2023-07-05 ENCOUNTER — PATIENT MESSAGE (OUTPATIENT)
Dept: UROGYNECOLOGY | Facility: CLINIC | Age: 48
End: 2023-07-05
Payer: COMMERCIAL

## 2023-07-05 DIAGNOSIS — N30.10 INTERSTITIAL CYSTITIS: Primary | ICD-10-CM

## 2023-07-05 RX ORDER — HYDROXYZINE HYDROCHLORIDE 10 MG/1
10 TABLET, FILM COATED ORAL 3 TIMES DAILY PRN
Qty: 30 TABLET | Refills: 11 | Status: SHIPPED | OUTPATIENT
Start: 2023-07-05

## 2023-07-06 ENCOUNTER — LAB VISIT (OUTPATIENT)
Dept: LAB | Facility: HOSPITAL | Age: 48
End: 2023-07-06
Attending: PHYSICIAN ASSISTANT
Payer: COMMERCIAL

## 2023-07-06 ENCOUNTER — TELEPHONE (OUTPATIENT)
Dept: UROGYNECOLOGY | Facility: CLINIC | Age: 48
End: 2023-07-06
Payer: COMMERCIAL

## 2023-07-06 DIAGNOSIS — N30.10 INTERSTITIAL CYSTITIS: ICD-10-CM

## 2023-07-06 DIAGNOSIS — R10.2 PELVIC PRESSURE IN FEMALE: ICD-10-CM

## 2023-07-06 LAB
BILIRUB UR QL STRIP: NEGATIVE
CLARITY UR: CLEAR
COLOR UR: COLORLESS
GLUCOSE UR QL STRIP: NEGATIVE
HGB UR QL STRIP: NEGATIVE
KETONES UR QL STRIP: NEGATIVE
LEUKOCYTE ESTERASE UR QL STRIP: NEGATIVE
NITRITE UR QL STRIP: NEGATIVE
PH UR STRIP: 7 [PH] (ref 5–8)
PROT UR QL STRIP: NEGATIVE
SP GR UR STRIP: <1.005 (ref 1–1.03)
URN SPEC COLLECT METH UR: ABNORMAL
UROBILINOGEN UR STRIP-ACNC: NEGATIVE EU/DL

## 2023-07-06 PROCEDURE — 81003 URINALYSIS AUTO W/O SCOPE: CPT | Performed by: PHYSICIAN ASSISTANT

## 2023-07-06 PROCEDURE — 87086 URINE CULTURE/COLONY COUNT: CPT | Performed by: PHYSICIAN ASSISTANT

## 2023-07-06 NOTE — TELEPHONE ENCOUNTER
----- Message from Lupis Freed sent at 7/6/2023  4:20 PM CDT -----  Name of Who is Calling:JESE KRUEGER [[id number]]                What is the request in detail: Pt states pharmacy is waiting for the doctor RAJEEV# for LIDOCAINE 2 %, VALIUM 5 MG, BACLOFEN 4 % SUPPOSITORY.Please call back to further assist.                 Can the clinic reply by [facility]: No                What Number to Call Back if not in [facility]:[phone]

## 2023-07-06 NOTE — TELEPHONE ENCOUNTER
----- Message from Kavita Sesay sent at 7/6/2023 12:25 PM CDT -----  Name of Who is Calling: JESE KRUEGER [[id number]]              What is the request in detail: Patient requesting a call back to discuss LIDOCAINE 2 %, VALIUM 5 MG, BACLOFEN 4 % SUPPOSITORY being called in. Says it was printed and not an E script              Can the clinic reply by [facility]: No              What Number to Call Back if not in [facility]: [phone] Leave a message if no answer       [facility] Emergency Department    CHIEF COMPLAINT  Headache (Patient with intermittent headaches during the last week. Patient denies vision changes, dizziness or lightheadedness. ) and Altered Mental Status (Patient with intermittent difficulty find thoughts or the right words,  states this is new within the last 2-3 days.)      HISTORY OF PRESENT ILLNESS  Quinton Wynn is a 82 y.o. female who presents to the ED complaining of confusion. Patient from home with her  who is at bedside.  reports for the past week patient has been complaining of intermittent headaches for the past 3 days she has become more confused than normal per . He states that she is just \"not acting herself. \"  He is unsure if she has been running a fever denies any significant sign of illness no vomiting, diarrhea, shortness of breath she has not complained of pain to her chest or abdomen just the intermittent headaches. No known falls or head injury. Patient is not on blood thinning medications. No other complaints, modifying factors or associated symptoms. Nursing notes reviewed.    Past Medical History:   Diagnosis Date    Cancer [facility])     breast    Hyperlipidemia     Hypertension     Trigeminal neuralgia     Trigeminal neuralgia 7/19/2018     Past Surgical History:   Procedure Laterality Date    CHOLECYSTECTOMY, LAPAROSCOPIC N/A 2/19/2019    LAPAROSCOPIC CHOLECYSTECTOMY WITH INTRAOPERATIVE CHOLANGIOGRAM performed by Luzma Best MD at [facility]    ERCP N/A 2/4/2019    ERCP ENDOSCOPIC RETROGRADE CHOLANGIOPANCREATOGRAPHY performed by Yuan Gage MD at [place] [address]    ERCP  2/4/2019    ERCP SPHINCTER/PAPILLOTOMY performed by Yuan Gage MD at [place] [address]    ERCP  2/4/2019    ERCP DILATION BALLOON performed by Yuan Gage MD at . [address]     1974     Family History   Problem Relation Age of Onset    Cancer Mother         unsure     Social History     Socioeconomic History    Marital status:      Spouse name: Not on file    Number of children: Not on file    Years of education: Not on file    Highest education level: Not on file   Occupational History    Not on file   Tobacco Use    Smoking status: Never    Smokeless tobacco: Never   Vaping Use    Vaping Use: Never used   Substance and Sexual Activity    Alcohol use: No    Drug use: No    Sexual activity: Not on file   Other Topics Concern    Not on file   Social History Narrative    Not on file     Social Determinants of Health     Financial Resource Strain: Not on file   Food Insecurity: Not on file   Transportation Needs: Not on file   Physical Activity: Not on file   Stress: Not on file   Social Connections: Not on file   Intimate Partner Violence: Not on file   Housing Stability: Not on file     Current Facility-Administered Medications   Medication Dose Route Frequency Provider Last Rate Last Admin    0.9 % sodium chloride bolus  500 mL IntraVENous Once LV Drew  mL/hr at 09/04/22 1335 500 mL at 09/04/22 1335     Current Outpatient Medications   Medication Sig Dispense Refill    naproxen (NAPROSYN) 250 MG tablet Take 1 tablet by mouth 2 times daily 10 tablet 0    lidocaine (LIDODERM) 5 % Place 1 patch onto the skin daily 12 hours on, 12 hours off. 30 patch 0    acetaminophen (TYLENOL) 500 MG tablet Take 500 mg by mouth every 6 hours as needed for Pain      gabapentin (NEURONTIN) 300 MG capsule Take 200 mg by mouth 2 times daily. Yesenia Garcia atenolol (TENORMIN) 50 MG tablet Take 50 mg by mouth daily. Allergies   Allergen Reactions    Erythromycin      cough       REVIEW OF SYSTEMS  10 systems reviewed, pertinent positives per HPI otherwise noted to be negative    PHYSICAL EXAM  BP (!) 171/91   Pulse 70   Temp 97.9 °F (36.6 °C) (Oral)   Resp 16   Ht 5' 2\" (1.575 m)   Wt 113 lb (51.3 kg)   SpO2 98%   BMI 20.67 kg/m²   GENERAL APPEARANCE: Awake and alert. Cooperative. No acute distress. Vital signs are stable. Well appearing and non toxic. HEAD: Normocephalic. Atraumatic. EYES: PERRL. EOM's grossly intact. ENT: Mucous membranes are moist.   NECK: Supple. Normal ROM. HEART: RRR. Distal pulses are equal and intact. Cap refill less than 2 seconds. LUNGS: Respirations unlabored. CTAB. Good air exchange. Speaking comfortably in full sentences. No wheezing, rhonchi, rales, stridor. ABDOMEN: Soft. Non-distended. Non-tender. No guarding or rebound. No rigidity. Bowel sounds are present. Negative thurman's. Negative McBurney's point. Negative CVA tenderness. EXTREMITIES: No peripheral edema. Moves all extremities equally. All extremities neurovascularly intact. SKIN: Warm and dry. No acute rashes. NEUROLOGICAL: Alert and oriented to self. Disoriented to time and situation. No gross facial drooping. Strength 5/5, sensation intact. Speech is clear. No aphasia. No dysmetria or dysarthria. No ataxia. PSYCHIATRIC: Normal mood and affect. SCREENINGS     NIH Score  NIH Stroke Scale  Interval: Baseline  Level of Consciousness (1a): Alert  LOC Questions (1b): Answers one correctly  LOC Commands (1c): Performs one task correctly  Best Gaze (2): Normal  Visual (3): No visual loss  Facial Palsy (4): Normal symmetrical movement  Motor Arm, Left (5a): No drift  Motor Arm, Right (5b): No drift  Motor Leg, Left (6a): No drift  Motor Leg, Right (6b):  No drift  Limb Ataxia (7): Absent  Sensory (8): Normal  Best Language (9): No aphasia  Dysarthria (10): Normal  Extinction and Inattention (11): No abnormality  Total: 2      RADIOLOGY  CT HEAD WO CONTRAST    Result Date: 9/4/2022  EXAMINATION: CT OF THE HEAD WITHOUT CONTRAST  9/4/2022 1:57 pm TECHNIQUE: CT of the head was performed without the administration of intravenous contrast. Automated exposure control, iterative reconstruction, and/or weight based adjustment of the mA/kV was utilized to reduce the radiation Lactic acid negative. Troponin less than 0.01. Ethanol negative. Urine drug screen negative. Urinalysis negative for infection. CT of the head shows atrophy and small vessel ischemic disease. Chest x-ray shows magnified cardiac silhouette without radiographic evidence of acute pulmonary abnormality seen. No clear-cut explanation as to why patient is having this acute onset of confusion but she was admitted to the hospital in stable condition for further evaluation and management. While in ED patient received   Medications   0.9 % sodium chloride bolus (500 mLs IntraVENous New Bag 9/4/22 1335)           A discussion was had with the patient and/or their surrogate regarding diagnosis, diagnostic testing results, treatment/ plan of care. There was shared decision-making between myself as well as the patient and/or their surrogate and we are all in agreement with hospital admission. There was an opportunity for questions and all questions were answered to the best of my ability and to the satisfaction of the patient and/or patient family.        Results for orders placed or performed during the hospital encounter of 09/04/22   Culture, Wound Aerobic Only    Specimen: Abdomen; Navel   Result Value Ref Range    Gram Stain Result       3+ WBC's (Polymorphonuclear)  1+ Epithelial Cells present  1+ Gram positive cocci     Urinalysis with Reflex to Culture    Specimen: Urine, straight catheter   Result Value Ref Range    Color, UA Yellow Straw/Yellow    Clarity, UA Clear Clear    Glucose, Ur Negative Negative mg/dL    Bilirubin Urine Negative Negative    Ketones, Urine Negative Negative mg/dL    Specific Gravity, UA 1.015 1.005 - 1.030    Blood, Urine Negative Negative    pH, UA 7.0 5.0 - 8.0    Protein,  (A) Negative mg/dL    Urobilinogen, Urine 1.0 <2.0 E.U./dL    Nitrite, Urine Negative Negative    Leukocyte Esterase, Urine Negative Negative    Microscopic Examination YES     Urine Type NotGiven Urine Reflex to Culture Not Indicated    Lactic Acid   Result Value Ref Range    Lactic Acid 1.4 0.4 - 2.0 mmol/L   Ammonia   Result Value Ref Range    Ammonia 26 11 - 51 umol/L   Urine Drug Screen   Result Value Ref Range    Amphetamine Screen, Urine Neg Negative <1000ng/mL    Barbiturate Screen, Ur Neg Negative <200 ng/mL    Benzodiazepine Screen, Urine Neg Negative <200 ng/mL    Cannabinoid Scrn, Ur Neg Negative <50 ng/mL    Cocaine Metabolite Screen, Urine Neg Negative <300 ng/mL    Opiate Scrn, Ur Neg Negative <300 ng/mL    PCP Screen, Urine Neg Negative <25 ng/mL    Methadone Screen, Urine Neg Negative <300 ng/mL    Oxycodone Urine Neg Negative <100 ng/ml    FENTANYL SCREEN, URINE Neg Negative <50 ng/mL    pH, UA 7.0     Drug Screen Comment: see below    CBC with Auto Differential   Result Value Ref Range    WBC 9.3 4.0 - 11.0 K/uL    RBC 4.57 4.00 - 5.20 M/uL    Hemoglobin 14.4 12.0 - 16.0 g/dL    Hematocrit 42.8 36.0 - 48.0 %    MCV 93.7 80.0 - 100.0 fL    MCH 31.6 26.0 - 34.0 pg    MCHC 33.7 31.0 - 36.0 g/dL    RDW 12.4 12.4 - 15.4 %    Platelets 131 904 - 796 K/uL    MPV 6.7 5.0 - 10.5 fL    Neutrophils % 60.6 %    Lymphocytes % 28.5 %    Monocytes % 9.3 %    Eosinophils % 0.8 %    Basophils % 0.8 %    Neutrophils Absolute 5.6 1.7 - 7.7 K/uL    Lymphocytes Absolute 2.6 1.0 - 5.1 K/uL    Monocytes Absolute 0.9 0.0 - 1.3 K/uL    Eosinophils Absolute 0.1 0.0 - 0.6 K/uL    Basophils Absolute 0.1 0.0 - 0.2 K/uL   Protime-INR   Result Value Ref Range    Protime 13.2 11.7 - 14.5 sec    INR 1.02 0.87 - 1.14   Troponin   Result Value Ref Range    Troponin <0.01 <0.01 ng/mL   Sample possible blood bank testing   Result Value Ref Range    Specimen Status LAITH    Comprehensive Metabolic Panel   Result Value Ref Range    Sodium 134 (L) 136 - 145 mmol/L    Potassium 3.4 (L) 3.5 - 5.1 mmol/L    Chloride 95 (L) 99 - 110 mmol/L    CO2 29 21 - 32 mmol/L    Anion Gap 10 3 - 16    Glucose 149 (H) 70 - 99 mg/dL    BUN 15 7 - 20 mg/dL    Creatinine 0.8 0.6 - 1.2 mg/dL    GFR Non-African American >60 >60    GFR African American >60 >60    Calcium 9.7 8.3 - 10.6 mg/dL    Total Protein 8.6 (H) 6.4 - 8.2 g/dL    Albumin 4.3 3.4 - 5.0 g/dL    Albumin/Globulin Ratio 1.0 (L) 1.1 - 2.2    Total Bilirubin 1.1 (H) 0.0 - 1.0 mg/dL    Alkaline Phosphatase 129 40 - 129 U/L    ALT 11 10 - 40 U/L    AST 22 15 - 37 U/L   Ethanol   Result Value Ref Range    Ethanol Lvl None Detected mg/dL   Acetaminophen Level   Result Value Ref Range    Acetaminophen Level <5 (L) 10 - 30 ug/mL   Hepatic Function Panel   Result Value Ref Range    Bilirubin, Direct <0.2 0.0 - 0.3 mg/dL    Bilirubin, Indirect see below 0.0 - 1.0 mg/dL   Salicylate   Result Value Ref Range    Salicylate, Serum <7.5 (L) 15.0 - 30.0 mg/dL   Microscopic Urinalysis   Result Value Ref Range    Coarse Casts, UA 0-2 0 - 2 /LPF    WBC, UA 0-2 0 - 5 /HPF    RBC, UA 0-2 0 - 4 /HPF    Epithelial Cells, UA 0-1 0 - 5 /HPF    Bacteria, UA 3+ (A) None Seen /HPF    Amorphous, UA 3+ /HPF   Lactic Acid   Result Value Ref Range    Lactic Acid 1.3 0.4 - 2.0 mmol/L   Procalcitonin   Result Value Ref Range    Procalcitonin 0.16 (H) 0.00 - 0.15 ng/mL   Protime-INR   Result Value Ref Range    Protime 14.2 11.7 - 14.5 sec    INR 1.12 0.87 - 1.14   APTT   Result Value Ref Range    aPTT 27.6 23.0 - 34.3 sec   Brain natriuretic peptide   Result Value Ref Range    Pro- (H) 0 - 449 pg/mL   Troponin   Result Value Ref Range    Troponin <0.01 <0.01 ng/mL   POCT Glucose   Result Value Ref Range    POC Glucose 132 (H) 70 - 99 mg/dl    Performed on [facility]    EKG 12 Lead   Result Value Ref Range    Ventricular Rate 65 BPM    Atrial Rate 65 BPM    P-R Interval 192 ms    QRS Duration 130 ms    Q-T Interval 452 ms    QTc Calculation (Bazett) 470 ms    P Axis 70 degrees    R Axis 74 degrees    T Axis 12 degrees    Diagnosis       Normal sinus rhythmNon-specific intra-ventricular conduction blockT wave abnormality,

## 2023-07-07 ENCOUNTER — PATIENT MESSAGE (OUTPATIENT)
Dept: UROGYNECOLOGY | Facility: CLINIC | Age: 48
End: 2023-07-07
Payer: COMMERCIAL

## 2023-07-08 ENCOUNTER — OFFICE VISIT (OUTPATIENT)
Dept: URGENT CARE | Facility: CLINIC | Age: 48
End: 2023-07-08
Payer: COMMERCIAL

## 2023-07-08 VITALS
DIASTOLIC BLOOD PRESSURE: 63 MMHG | SYSTOLIC BLOOD PRESSURE: 94 MMHG | HEIGHT: 62 IN | BODY MASS INDEX: 20.98 KG/M2 | HEART RATE: 92 BPM | RESPIRATION RATE: 16 BRPM | TEMPERATURE: 98 F | WEIGHT: 114 LBS | OXYGEN SATURATION: 97 %

## 2023-07-08 DIAGNOSIS — R35.0 URINARY FREQUENCY: ICD-10-CM

## 2023-07-08 DIAGNOSIS — N30.10 INTERSTITIAL CYSTITIS: Primary | ICD-10-CM

## 2023-07-08 LAB
BACTERIA UR CULT: NO GROWTH
BILIRUB UR QL STRIP: NEGATIVE
GLUCOSE UR QL STRIP: NEGATIVE
KETONES UR QL STRIP: NEGATIVE
LEUKOCYTE ESTERASE UR QL STRIP: NEGATIVE
PH, POC UA: 5
POC BLOOD, URINE: NEGATIVE
POC NITRATES, URINE: NEGATIVE
PROT UR QL STRIP: NEGATIVE
SP GR UR STRIP: 1 (ref 1–1.03)
UROBILINOGEN UR STRIP-ACNC: NORMAL (ref 0.1–1.1)

## 2023-07-08 PROCEDURE — 81003 POCT URINALYSIS, DIPSTICK, AUTOMATED, W/O SCOPE: ICD-10-PCS | Mod: QW,S$GLB,,

## 2023-07-08 PROCEDURE — 99213 OFFICE O/P EST LOW 20 MIN: CPT | Mod: S$GLB,,,

## 2023-07-08 PROCEDURE — 99213 PR OFFICE/OUTPT VISIT, EST, LEVL III, 20-29 MIN: ICD-10-PCS | Mod: S$GLB,,,

## 2023-07-08 PROCEDURE — 81003 URINALYSIS AUTO W/O SCOPE: CPT | Mod: QW,S$GLB,,

## 2023-07-08 RX ORDER — ESTRADIOL 1 MG/1
1 TABLET ORAL EVERY MORNING
COMMUNITY
Start: 2023-06-29 | End: 2023-09-26 | Stop reason: DRUGHIGH

## 2023-07-08 RX ORDER — ESTRADIOL 0.1 MG/G
CREAM VAGINAL
COMMUNITY
Start: 2023-04-13

## 2023-07-08 RX ORDER — HYDROXYZINE HYDROCHLORIDE 10 MG/1
1 TABLET, FILM COATED ORAL NIGHTLY
COMMUNITY
End: 2023-09-26

## 2023-07-08 NOTE — PROGRESS NOTES
"Subjective:      Patient ID: Rosa Maria Kay is a 48 y.o. female.    Vitals:  height is 5' 2" (1.575 m) and weight is 51.7 kg (114 lb). Her oral temperature is 97.9 °F (36.6 °C). Her blood pressure is 94/63 and her pulse is 92. Her respiration is 16 and oxygen saturation is 97%.     Chief Complaint: Dysuria    Patient states that she thinks that she is  having an interstitial cystitis flare. She states that she has had a UA/urine culture from her uro/gyn. She was informed by the MA staff that her UA was abnormal and that antibitocis would be sent over for her. Patient states that she is going out of town Monday morning for 2 weeks and will not be able to  any abx at that time so she came in to be assessed for any indications for antibiotics. She states that she is having urgency, frequency. She denies dysuria, fever, chills, flank pain, hematuria, abdominal pain. Patient has tried hydroxyzine and uroblue with mild relief of symptoms. Patient states that abx usage does not typically treat her symptoms but wanted to ensure that she did not need abx due to being informed on her abnormal UA.    Urinary Frequency   This is a chronic (history of IC) problem. The problem occurs every urination. There has been no fever. Associated symptoms include frequency and urgency. Pertinent negatives include no behavior changes, chills, discharge, flank pain, hematuria, hesitancy, nausea, possible pregnancy, sweats, vomiting, weight loss, bubble bath use, constipation, rash or withholding. Treatments tried: hydroxyzine and uroblue. The treatment provided mild relief. Her past medical history is significant for recurrent UTIs. There is no history of catheterization, diabetes insipidus, diabetes mellitus, genitourinary reflux, hypertension, kidney stones, a single kidney, STD, urinary stasis or a urological procedure.     Constitution: Negative for chills and fever.   Cardiovascular:  Negative for chest pain and sob on " exertion.   Respiratory:  Negative for shortness of breath.    Gastrointestinal:  Negative for abdominal pain, nausea, vomiting, constipation and diarrhea.   Genitourinary:  Positive for frequency and urgency. Negative for dysuria, flank pain and hematuria.   Skin:  Negative for rash.      Objective:     Physical Exam   Constitutional:  Non-toxic appearance. She does not appear ill. No distress. normal  HENT:   Head: Normocephalic.   Cardiovascular: Normal rate, regular rhythm and normal heart sounds.   No murmur heard.Exam reveals no gallop and no friction rub.   Pulmonary/Chest: Effort normal and breath sounds normal. No stridor. No respiratory distress. She has no wheezes. She has no rhonchi. She has no rales. She exhibits no tenderness.         Comments: Patient is in no respiratory distress. Breath sounds even, unlabored, and clear to auscultation bilaterally. No accessory muscle usage. Patient able to speak in complete sentences with ease.    Abdominal: Normal appearance and bowel sounds are normal. She exhibits no distension and no mass. Soft. There is no abdominal tenderness. There is no rebound, no guarding, no left CVA tenderness and no right CVA tenderness. No hernia.   Neurological: She is alert.   Skin: Skin is not diaphoretic.   Nursing note and vitals reviewed.    Results for orders placed or performed in visit on 07/08/23   POCT Urinalysis, Dipstick, Automated, W/O Scope   Result Value Ref Range    POC Blood, Urine Negative Negative    POC Bilirubin, Urine Negative Negative    POC Urobilinogen, Urine norm 0.1 - 1.1    POC Ketones, Urine Negative Negative    POC Protein, Urine Negative Negative    POC Nitrates, Urine Negative Negative    POC Glucose, Urine Negative Negative    pH, UA 5.0     POC Specific Gravity, Urine 1.005 1.003 - 1.029    POC Leukocytes, Urine Negative Negative     Assessment:     1. Interstitial cystitis    2. Urinary frequency      Plan:   Previous Uro/GYN note reviewed.  Vital  signs reviewed.  Labs ordered. Labs reviewed.  Discussed interstitial cystitis, home care, tx options, and given follow up precautions.  Patient was briefed on my thought process and diagnosis.   Patient involved with the treatment plan and agreed to the plan. Will have patient continue recommendation from her uro/gyn regarding her interstitial cystitis, there are no signs of bacterial infection indicating abx at this time, patient encouraged to follow up with uro/gyn.  Patient informed on warning signs, patient understood warning signs and to go to urgent care or ER if warning signs appear.  Patient discussed with Dr. Joe Burnett via secure chat, Dr. Burnett agrees to the plan.     Patient Instructions   Please return here or go to the Emergency Department for any concerns or worsening of condition.    Please follow up with your primary care doctor or specialist as needed.    If you  smoke, please stop smoking.    Please consider the following from your URO/GYN:  IC  --Prelief to help alkinize the urine:              --Prelief Tablets : Each tablet contains 345 mg calcium glycerophosphate (65 mg of elemental calcium). The tablets also contain 0.25% magnesium            stearate as a processing aid. Two tablets are equivalent to 690 mg calcium glycerophosphate (130mg of elemental calcium).              --Prelief Powder : Each ¼ teaspoon usage of powder is comparable to two tablets. The powder dissolves rapidly in food or non-alcoholic beverages.             Tablets are recommended for taking with alcoholic beverages              --Usage:                           --Tablets: 2-3 tablets, 2 times a day.                          --Powder: ¼ teaspoon of powder 2 times a day. More can be used when needed  --The IC diet:  https://www.ichelp.org/wp-content/uploads/2015/07/food-list.pdf   -- Continue Hydroxyzine 10 mg at night for relief of bladder pain- will add colace twice a day if no improvement will stop hydroxyzine and  start elavil 10 mg at night.   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     Interstitial cystitis    Urinary frequency  -     POCT Urinalysis, Dipstick, Automated, W/O Scope      Luna Solorzano PA-C

## 2023-07-08 NOTE — PATIENT INSTRUCTIONS
Please return here or go to the Emergency Department for any concerns or worsening of condition.    Please follow up with your primary care doctor or specialist as needed.    If you  smoke, please stop smoking.    Please consider the following from your URO/GYN:  IC  --Prelief to help alkinize the urine:              --Prelief Tablets : Each tablet contains 345 mg calcium glycerophosphate (65 mg of elemental calcium). The tablets also contain 0.25% magnesium            stearate as a processing aid. Two tablets are equivalent to 690 mg calcium glycerophosphate (130mg of elemental calcium).              --Prelief Powder : Each ¼ teaspoon usage of powder is comparable to two tablets. The powder dissolves rapidly in food or non-alcoholic beverages.             Tablets are recommended for taking with alcoholic beverages              --Usage:                           --Tablets: 2-3 tablets, 2 times a day.                          --Powder: ¼ teaspoon of powder 2 times a day. More can be used when needed  --The IC diet:  https://www.ichelp.org/wp-content/uploads/2015/07/food-list.pdf   -- Continue Hydroxyzine 10 mg at night for relief of bladder pain- will add colace twice a day if no improvement will stop hydroxyzine and start elavil 10 mg at night.   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

## 2023-08-16 ENCOUNTER — PATIENT MESSAGE (OUTPATIENT)
Dept: UROGYNECOLOGY | Facility: CLINIC | Age: 48
End: 2023-08-16
Payer: COMMERCIAL

## 2023-08-17 ENCOUNTER — TELEPHONE (OUTPATIENT)
Dept: UROGYNECOLOGY | Facility: CLINIC | Age: 48
End: 2023-08-17
Payer: COMMERCIAL

## 2023-08-17 NOTE — TELEPHONE ENCOUNTER
----- Message from John Brown PA-C sent at 8/16/2023  5:06 PM CDT -----  Regarding: Follow up visit  Please call patient to schedule follow up visit in the next month.     Thanks,   John

## 2023-08-21 ENCOUNTER — TELEPHONE (OUTPATIENT)
Dept: UROGYNECOLOGY | Facility: CLINIC | Age: 48
End: 2023-08-21
Payer: COMMERCIAL

## 2023-08-21 NOTE — TELEPHONE ENCOUNTER
----- Message from Radhika Powers RN sent at 8/18/2023  4:15 PM CDT -----  Regarding: FW: Return Call    ----- Message -----  From: Ricardo Carr  Sent: 8/18/2023   3:38 PM CDT  To: [facility] Staff  Subject: Return Call                                        Who Called:  Patient      Who Left Message for Patient:  Noemi      Does the patient know what this is regarding?  Call Back        Best Call Back Number: [phone]         Additional Information: Patient is returning a call.  Please assist.

## 2023-09-08 ENCOUNTER — PATIENT MESSAGE (OUTPATIENT)
Dept: FAMILY MEDICINE | Facility: CLINIC | Age: 48
End: 2023-09-08
Payer: COMMERCIAL

## 2023-09-08 DIAGNOSIS — G25.81 RESTLESS LEG: ICD-10-CM

## 2023-09-08 RX ORDER — ROPINIROLE 1 MG/1
1 TABLET, FILM COATED ORAL 2 TIMES DAILY
Qty: 60 TABLET | Refills: 0 | Status: SHIPPED | OUTPATIENT
Start: 2023-09-08 | End: 2023-09-26 | Stop reason: SDUPTHER

## 2023-09-08 RX ORDER — ROPINIROLE 1 MG/1
1 TABLET, FILM COATED ORAL 2 TIMES DAILY
Qty: 60 TABLET | Refills: 11 | OUTPATIENT
Start: 2023-09-08

## 2023-09-08 RX ORDER — ROPINIROLE 1 MG/1
TABLET, FILM COATED ORAL
Qty: 60 TABLET | Refills: 11 | OUTPATIENT
Start: 2023-09-08

## 2023-09-18 ENCOUNTER — PATIENT MESSAGE (OUTPATIENT)
Dept: FAMILY MEDICINE | Facility: CLINIC | Age: 48
End: 2023-09-18
Payer: COMMERCIAL

## 2023-09-18 DIAGNOSIS — Z00.00 ANNUAL PHYSICAL EXAM: Primary | ICD-10-CM

## 2023-09-23 ENCOUNTER — LAB VISIT (OUTPATIENT)
Dept: LAB | Facility: HOSPITAL | Age: 48
End: 2023-09-23
Attending: FAMILY MEDICINE
Payer: COMMERCIAL

## 2023-09-23 DIAGNOSIS — Z00.00 ANNUAL PHYSICAL EXAM: ICD-10-CM

## 2023-09-23 LAB
ALBUMIN SERPL BCP-MCNC: 4.2 G/DL (ref 3.5–5.2)
ALP SERPL-CCNC: 52 U/L (ref 55–135)
ALT SERPL W/O P-5'-P-CCNC: 8 U/L (ref 10–44)
ANION GAP SERPL CALC-SCNC: 6 MMOL/L (ref 8–16)
AST SERPL-CCNC: 17 U/L (ref 10–40)
BASOPHILS # BLD AUTO: 0.02 K/UL (ref 0–0.2)
BASOPHILS NFR BLD: 0.5 % (ref 0–1.9)
BILIRUB SERPL-MCNC: 0.3 MG/DL (ref 0.1–1)
BUN SERPL-MCNC: 12 MG/DL (ref 6–20)
CALCIUM SERPL-MCNC: 9.6 MG/DL (ref 8.7–10.5)
CHLORIDE SERPL-SCNC: 108 MMOL/L (ref 95–110)
CHOLEST SERPL-MCNC: 214 MG/DL (ref 120–199)
CHOLEST/HDLC SERPL: 2.2 {RATIO} (ref 2–5)
CO2 SERPL-SCNC: 29 MMOL/L (ref 23–29)
CREAT SERPL-MCNC: 0.9 MG/DL (ref 0.5–1.4)
DIFFERENTIAL METHOD: ABNORMAL
EOSINOPHIL # BLD AUTO: 0 K/UL (ref 0–0.5)
EOSINOPHIL NFR BLD: 0.8 % (ref 0–8)
ERYTHROCYTE [DISTWIDTH] IN BLOOD BY AUTOMATED COUNT: 12.8 % (ref 11.5–14.5)
EST. GFR  (NO RACE VARIABLE): >60 ML/MIN/1.73 M^2
GLUCOSE SERPL-MCNC: 81 MG/DL (ref 70–110)
HCT VFR BLD AUTO: 38 % (ref 37–48.5)
HDLC SERPL-MCNC: 99 MG/DL (ref 40–75)
HDLC SERPL: 46.3 % (ref 20–50)
HGB BLD-MCNC: 11.8 G/DL (ref 12–16)
IMM GRANULOCYTES # BLD AUTO: 0.02 K/UL (ref 0–0.04)
IMM GRANULOCYTES NFR BLD AUTO: 0.5 % (ref 0–0.5)
LDLC SERPL CALC-MCNC: 105.2 MG/DL (ref 63–159)
LYMPHOCYTES # BLD AUTO: 1.6 K/UL (ref 1–4.8)
LYMPHOCYTES NFR BLD: 40 % (ref 18–48)
MCH RBC QN AUTO: 31.3 PG (ref 27–31)
MCHC RBC AUTO-ENTMCNC: 31.1 G/DL (ref 32–36)
MCV RBC AUTO: 101 FL (ref 82–98)
MONOCYTES # BLD AUTO: 0.3 K/UL (ref 0.3–1)
MONOCYTES NFR BLD: 7.8 % (ref 4–15)
NEUTROPHILS # BLD AUTO: 2 K/UL (ref 1.8–7.7)
NEUTROPHILS NFR BLD: 50.4 % (ref 38–73)
NONHDLC SERPL-MCNC: 115 MG/DL
NRBC BLD-RTO: 0 /100 WBC
PLATELET # BLD AUTO: 248 K/UL (ref 150–450)
PMV BLD AUTO: 10.3 FL (ref 9.2–12.9)
POTASSIUM SERPL-SCNC: 3.9 MMOL/L (ref 3.5–5.1)
PROT SERPL-MCNC: 7 G/DL (ref 6–8.4)
RBC # BLD AUTO: 3.77 M/UL (ref 4–5.4)
SODIUM SERPL-SCNC: 143 MMOL/L (ref 136–145)
TRIGL SERPL-MCNC: 49 MG/DL (ref 30–150)
TSH SERPL DL<=0.005 MIU/L-ACNC: 1.04 UIU/ML (ref 0.4–4)
WBC # BLD AUTO: 3.95 K/UL (ref 3.9–12.7)

## 2023-09-23 PROCEDURE — 80053 COMPREHEN METABOLIC PANEL: CPT | Performed by: FAMILY MEDICINE

## 2023-09-23 PROCEDURE — 85025 COMPLETE CBC W/AUTO DIFF WBC: CPT | Performed by: FAMILY MEDICINE

## 2023-09-23 PROCEDURE — 80061 LIPID PANEL: CPT | Performed by: FAMILY MEDICINE

## 2023-09-23 PROCEDURE — 36415 COLL VENOUS BLD VENIPUNCTURE: CPT | Mod: PO | Performed by: FAMILY MEDICINE

## 2023-09-23 PROCEDURE — 84443 ASSAY THYROID STIM HORMONE: CPT | Performed by: FAMILY MEDICINE

## 2023-09-26 ENCOUNTER — OFFICE VISIT (OUTPATIENT)
Dept: FAMILY MEDICINE | Facility: CLINIC | Age: 48
End: 2023-09-26
Payer: COMMERCIAL

## 2023-09-26 VITALS
HEART RATE: 80 BPM | BODY MASS INDEX: 20.48 KG/M2 | HEIGHT: 62 IN | TEMPERATURE: 98 F | DIASTOLIC BLOOD PRESSURE: 66 MMHG | SYSTOLIC BLOOD PRESSURE: 102 MMHG | OXYGEN SATURATION: 97 % | WEIGHT: 111.31 LBS

## 2023-09-26 DIAGNOSIS — G25.81 RESTLESS LEG: ICD-10-CM

## 2023-09-26 DIAGNOSIS — Z00.00 ANNUAL PHYSICAL EXAM: Primary | ICD-10-CM

## 2023-09-26 PROCEDURE — 3008F PR BODY MASS INDEX (BMI) DOCUMENTED: ICD-10-PCS | Mod: CPTII,S$GLB,, | Performed by: FAMILY MEDICINE

## 2023-09-26 PROCEDURE — 3074F SYST BP LT 130 MM HG: CPT | Mod: CPTII,S$GLB,, | Performed by: FAMILY MEDICINE

## 2023-09-26 PROCEDURE — 3078F DIAST BP <80 MM HG: CPT | Mod: CPTII,S$GLB,, | Performed by: FAMILY MEDICINE

## 2023-09-26 PROCEDURE — 3008F BODY MASS INDEX DOCD: CPT | Mod: CPTII,S$GLB,, | Performed by: FAMILY MEDICINE

## 2023-09-26 PROCEDURE — 99396 PR PREVENTIVE VISIT,EST,40-64: ICD-10-PCS | Mod: S$GLB,,, | Performed by: FAMILY MEDICINE

## 2023-09-26 PROCEDURE — 99999 PR PBB SHADOW E&M-EST. PATIENT-LVL III: CPT | Mod: PBBFAC,,, | Performed by: FAMILY MEDICINE

## 2023-09-26 PROCEDURE — 1159F PR MEDICATION LIST DOCUMENTED IN MEDICAL RECORD: ICD-10-PCS | Mod: CPTII,S$GLB,, | Performed by: FAMILY MEDICINE

## 2023-09-26 PROCEDURE — 3078F PR MOST RECENT DIASTOLIC BLOOD PRESSURE < 80 MM HG: ICD-10-PCS | Mod: CPTII,S$GLB,, | Performed by: FAMILY MEDICINE

## 2023-09-26 PROCEDURE — 99999 PR PBB SHADOW E&M-EST. PATIENT-LVL III: ICD-10-PCS | Mod: PBBFAC,,, | Performed by: FAMILY MEDICINE

## 2023-09-26 PROCEDURE — 1159F MED LIST DOCD IN RCRD: CPT | Mod: CPTII,S$GLB,, | Performed by: FAMILY MEDICINE

## 2023-09-26 PROCEDURE — 3074F PR MOST RECENT SYSTOLIC BLOOD PRESSURE < 130 MM HG: ICD-10-PCS | Mod: CPTII,S$GLB,, | Performed by: FAMILY MEDICINE

## 2023-09-26 PROCEDURE — 99396 PREV VISIT EST AGE 40-64: CPT | Mod: S$GLB,,, | Performed by: FAMILY MEDICINE

## 2023-09-26 RX ORDER — ROPINIROLE 1 MG/1
1 TABLET, FILM COATED ORAL 2 TIMES DAILY
Qty: 180 TABLET | Refills: 3 | Status: SHIPPED | OUTPATIENT
Start: 2023-09-26

## 2023-09-26 RX ORDER — ESTRADIOL 2 MG/1
2 TABLET ORAL EVERY MORNING
COMMUNITY
Start: 2023-09-13

## 2023-09-26 RX ORDER — PROGESTERONE 200 MG/1
200 CAPSULE ORAL NIGHTLY
COMMUNITY
Start: 2023-08-30

## 2023-09-26 NOTE — PROGRESS NOTES
Answers submitted by the patient for this visit:  Review of Systems Questionnaire (Submitted on 9/20/2023)  activity change: No  unexpected weight change: Yes  neck pain: No  hearing loss: No  rhinorrhea: No  trouble swallowing: No  eye discharge: No  visual disturbance: Yes  chest tightness: No  wheezing: No  chest pain: No  palpitations: No  blood in stool: No  constipation: Yes  vomiting: No  diarrhea: No  polydipsia: No  polyuria: No  difficulty urinating: No  hematuria: No  menstrual problem: No  dysuria: No  joint swelling: No  arthralgias: No  headaches: No  weakness: No  confusion: No  dysphoric mood: No

## 2023-09-26 NOTE — MEDICAL/APP STUDENT
Subjective     Patient ID: Rosa Maria Kay is a 48 y.o. female.    Chief Complaint: Annual Exam    48 y.o female presents for an annual exam. Pt needs a refill of her Requip. No complaints today. She states that she diets and exercises, walks 3-6 miles per day and does strength training. She sees urogyn (Dr. Silva) and her next appointment is on 10/12/23. She states that she switched from the testosterone injections to the testosterone cream. She notes that she was on Accutane and had labs done by her dermatologist that showed elevated cholesterol and HDL. Pt had labs done on 9/23/23 and would like to go over results since her cholesterol and HDL were slightly elevated again.       Review of Systems   Constitutional:  Negative for chills, fatigue and fever.   HENT:  Negative for nasal congestion, rhinorrhea and sore throat.    Respiratory:  Negative for cough, chest tightness and shortness of breath.    Cardiovascular:  Negative for chest pain, palpitations and leg swelling.   Gastrointestinal:  Negative for abdominal pain, diarrhea and vomiting.   Genitourinary:  Negative for hematuria.   Musculoskeletal:  Negative for myalgias.   Neurological:  Negative for dizziness, light-headedness, numbness and headaches.          Objective     Physical Exam  Constitutional:       General: She is not in acute distress.     Appearance: Normal appearance. She is not ill-appearing or toxic-appearing.   HENT:      Head: Normocephalic and atraumatic.   Neck:      Thyroid: No thyromegaly.   Cardiovascular:      Rate and Rhythm: Normal rate and regular rhythm.      Pulses: Normal pulses.      Heart sounds: Normal heart sounds. No murmur heard.     No friction rub. No gallop.   Pulmonary:      Effort: Pulmonary effort is normal. No respiratory distress.      Breath sounds: Normal breath sounds. No stridor. No wheezing, rhonchi or rales.   Abdominal:      General: Abdomen is flat. Bowel sounds are normal. There is no  distension.      Palpations: Abdomen is soft. There is no mass.      Tenderness: There is no abdominal tenderness. There is no guarding or rebound.   Musculoskeletal:         General: No swelling.      Cervical back: Normal range of motion.   Skin:     Findings: No rash.   Neurological:      Mental Status: She is alert and oriented to person, place, and time.   Psychiatric:         Mood and Affect: Mood normal.         Behavior: Behavior normal.            Assessment and Plan     1. Annual physical exam  Discussed lab results.     2. Restless leg  -     rOPINIRole (REQUIP) 1 MG tablet; Take 1 tablet (1 mg total) by mouth 2 (two) times daily.  Dispense: 180 tablet; Refill: 3  Stable. Refilled meds.        RODRIGO June         Follow up in about 1 year (around 9/26/2024).

## 2023-09-26 NOTE — PROGRESS NOTES
"Subjective     Patient ID: Rosa Maria Kay is a 48 y.o. female.    Chief Complaint: Annual Exam    HPI  Review of Systems   Constitutional:  Positive for unexpected weight change. Negative for activity change.   HENT:  Negative for hearing loss, rhinorrhea and trouble swallowing.    Eyes:  Positive for visual disturbance. Negative for discharge.   Respiratory:  Negative for cough, chest tightness, shortness of breath and wheezing.    Cardiovascular:  Negative for chest pain, palpitations and leg swelling.   Gastrointestinal:  Positive for constipation. Negative for abdominal pain, blood in stool, diarrhea and vomiting.   Endocrine: Negative for polydipsia and polyuria.   Genitourinary:  Negative for difficulty urinating, dysuria, hematuria and menstrual problem.   Musculoskeletal:  Negative for arthralgias, joint swelling and neck pain.   Neurological:  Negative for dizziness, weakness, light-headedness and headaches.   Psychiatric/Behavioral:  Negative for confusion and dysphoric mood.           Objective     Vitals:    09/26/23 0902   BP: 102/66   Pulse: 80   Temp: 98.3 °F (36.8 °C)   TempSrc: Oral   SpO2: 97%   Weight: 50.5 kg (111 lb 5.3 oz)   Height: 5' 2" (1.575 m)       Physical Exam  Constitutional:       General: She is not in acute distress.     Appearance: She is well-developed. She is not diaphoretic.   HENT:      Head: Normocephalic.      Right Ear: External ear normal.      Left Ear: External ear normal.      Mouth/Throat:      Pharynx: No oropharyngeal exudate.   Eyes:      Conjunctiva/sclera: Conjunctivae normal.   Neck:      Thyroid: No thyromegaly.   Cardiovascular:      Rate and Rhythm: Normal rate and regular rhythm.      Heart sounds: Normal heart sounds. No murmur heard.     No friction rub. No gallop.   Pulmonary:      Effort: Pulmonary effort is normal. No respiratory distress.      Breath sounds: Normal breath sounds. No wheezing or rales.   Abdominal:      General: Bowel sounds are " normal. There is no distension.      Palpations: Abdomen is soft. There is no mass.      Tenderness: There is no abdominal tenderness. There is no guarding or rebound.   Musculoskeletal:      Cervical back: Normal range of motion and neck supple.   Lymphadenopathy:      Cervical: No cervical adenopathy.   Skin:     Findings: No rash.   Neurological:      Mental Status: She is alert.            Assessment and Plan     1. Annual physical exam    2. Restless leg  -     rOPINIRole (REQUIP) 1 MG tablet; Take 1 tablet (1 mg total) by mouth 2 (two) times daily.  Dispense: 180 tablet; Refill: 3        ***         Follow up in about 1 year (around 9/26/2024).

## 2023-09-26 NOTE — PROGRESS NOTES
Subjective     Patient ID: Rosa Maria Kay is a 48 y.o. female.    Chief Complaint: Annual Exam    48 year old here for an annual exam. She has no major concerns.       She had her labs done and they are within normal limits.   She receieves her well woman with urogyn,.    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 Wheeler  1984: Puberty, precocious   Past Surgical History:  :  SECTION  2017: COLD KNIFE CONIZATION OF CERVIX      Comment:  w/ ESSURE   2020: COLONOSCOPY      Comment:  NORMAL. Repat 3 yrs --  DR Jessica Vogt   2020: COLONOSCOPY; N/A      Comment:  Procedure: COLONOSCOPY;  Surgeon: Jessica Vogt MD;                 Location: [facility];  Service: Endoscopy;  Laterality:                N/A;  CV-19 ORDERED  Essure: TUBAL LIGATION  : VAGINAL BIRTH AFTER  SECTION  Review of patient's family history indicates:  Problem: Cancer      Relation: Mother          Age of Onset: 44          Comment: colon  Problem: Colon cancer      Relation: Mother          Age of Onset: (Not Specified)  Problem: No Known Problems      Relation: Father          Age of Onset: (Not Specified)  Problem: Learning disabilities      Relation: Brother          Age of Onset: (Not Specified)  Problem: Mental illness      Relation: Brother          Age of Onset: (Not Specified)  Problem: No Known Problems      Relation: Brother          Age of Onset: (Not Specified)  Problem: Cancer      Relation: Maternal Aunt          Age of Onset: (Not Specified)          Comment: Lymphoma ??   Problem: Colon cancer      Relation: Paternal Aunt          Age of Onset: (Not Specified)  Problem: No Known Problems      Relation: Paternal Grandfather          Age of Onset: (Not Specified)  Problem: Heart  attack      Relation: Paternal Grandmother          Age of Onset: (Not Specified)  Problem: Heart disease      Relation: Maternal Grandmother          Age of Onset: (Not Specified)  Problem: Diabetes      Relation: Maternal Grandfather          Age of Onset: (Not Specified)  Problem: Stroke      Relation: Maternal Grandfather          Age of Onset: (Not Specified)  Problem: Breast cancer      Relation: Neg Hx          Age of Onset: (Not Specified)    Social History    Socioeconomic History      Marital status:     Tobacco Use      Smoking status: Never      Smokeless tobacco: Never    Substance and Sexual Activity      Alcohol use: Yes        Comment: Occational      Drug use: Never      Sexual activity: Not Currently        Partners: Male        Birth control/protection: See Surgical Hx        Comment: :     ESSURE  2017    Social Determinants of Health  Financial Resource Strain: Low Risk  (9/20/2023)      Overall Financial Resource Strain (CARDIA)          Difficulty of Paying Living Expenses: Not hard at all  Food Insecurity: No Food Insecurity (9/20/2023)      Hunger Vital Sign          Worried About Running Out of Food in the Last Year: Never true          Ran Out of Food in the Last Year: Never true  Transportation Needs: No Transportation Needs (9/20/2023)      PRAPARE - Transportation          Lack of Transportation (Medical): No          Lack of Transportation (Non-Medical): No  Physical Activity: Sufficiently Active (9/20/2023)      Exercise Vital Sign          Days of Exercise per Week: 7 days          Minutes of Exercise per Session: 60 min  Stress: Stress Concern Present (9/20/2023)      [place] [employer] - Occupational Stress Questionnaire          Feeling of Stress : To some extent  Social Connections: Unknown (9/20/2023)      Social Connection and Isolation Panel [NHANES]          Frequency of Communication with Friends and Family: Never          Frequency of  "Social Gatherings with Friends and Family: Never          Active Member of Clubs or Organizations: Yes          Attends Club or Organization Meetings: 1 to 4 times per year          Marital Status:   Housing Stability: Low Risk  (9/20/2023)      Housing Stability Vital Sign          Unable to Pay for Housing in the Last Year: No          Number of Places Lived in the Last Year: 1          Unstable Housing in the Last Year: No          Review of Systems   Constitutional:  Positive for unexpected weight change. Negative for activity change.   HENT:  Negative for hearing loss, rhinorrhea and trouble swallowing.    Eyes:  Positive for visual disturbance. Negative for discharge.   Respiratory:  Negative for chest tightness and wheezing.    Cardiovascular:  Negative for chest pain and palpitations.   Gastrointestinal:  Positive for constipation. Negative for blood in stool, diarrhea and vomiting.   Endocrine: Negative for polydipsia and polyuria.   Genitourinary:  Negative for difficulty urinating, dysuria, hematuria and menstrual problem.   Musculoskeletal:  Negative for arthralgias, joint swelling and neck pain.   Neurological:  Negative for weakness and headaches.   Psychiatric/Behavioral:  Negative for confusion and dysphoric mood.           Objective     Vitals:    09/26/23 0902   BP: 102/66   Pulse: 80   Temp: 98.3 °F (36.8 °C)   TempSrc: Oral   SpO2: 97%   Weight: 50.5 kg (111 lb 5.3 oz)   Height: 5' 2" (1.575 m)       Physical Exam  Constitutional:       General: She is not in acute distress.     Appearance: Normal appearance. She is well-developed. She is not diaphoretic.   HENT:      Head: Normocephalic and atraumatic.      Right Ear: External ear normal.      Left Ear: External ear normal.      Mouth/Throat:      Pharynx: No oropharyngeal exudate.   Eyes:      Conjunctiva/sclera: Conjunctivae normal.   Neck:      Thyroid: No thyromegaly.   Cardiovascular:      Rate and Rhythm: Normal rate and regular " rhythm.      Heart sounds: Normal heart sounds. No murmur heard.     No friction rub. No gallop.   Pulmonary:      Effort: Pulmonary effort is normal. No respiratory distress.      Breath sounds: Normal breath sounds. No stridor. No wheezing, rhonchi or rales.   Abdominal:      General: Bowel sounds are normal. There is no distension.      Palpations: Abdomen is soft. There is no mass.      Tenderness: There is no abdominal tenderness. There is no guarding or rebound.      Hernia: No hernia is present.   Musculoskeletal:      Cervical back: Normal range of motion and neck supple.   Lymphadenopathy:      Cervical: No cervical adenopathy.   Skin:     Findings: No rash.   Neurological:      General: No focal deficit present.      Mental Status: She is alert and oriented to person, place, and time.   Psychiatric:         Mood and Affect: Mood normal.         Behavior: Behavior normal.            Assessment and Plan     1. Annual physical exam  Labs reviewed and are normal. Mammogram in January .     2. Restless leg  -     rOPINIRole (REQUIP) 1 MG tablet; Take 1 tablet (1 mg total) by mouth 2 (two) times daily.  Dispense: 180 tablet; Refill: 3                 Follow up in about 1 year (around 9/26/2024).

## 2023-10-09 ENCOUNTER — PATIENT MESSAGE (OUTPATIENT)
Dept: ADMINISTRATIVE | Facility: HOSPITAL | Age: 48
End: 2023-10-09
Payer: COMMERCIAL

## 2023-10-10 ENCOUNTER — PATIENT OUTREACH (OUTPATIENT)
Dept: ADMINISTRATIVE | Facility: HOSPITAL | Age: 48
End: 2023-10-10
Payer: COMMERCIAL

## 2023-10-10 DIAGNOSIS — Z12.31 SCREENING MAMMOGRAM FOR HIGH-RISK PATIENT: Primary | ICD-10-CM

## 2023-10-10 NOTE — PROGRESS NOTES
Health Maintenance Due   Topic Date Due    HIV Screening  Never done    Influenza Vaccine (1) 09/01/2023    COVID-19 Vaccine (7 - 2023-24 season) 09/01/2023    Mammogram  01/04/2024   Chart review done.  updated. Immunizations reviewed & updated. Care Everywhere updated.   MAMMOGRAM ORDER PLACED

## 2023-10-17 ENCOUNTER — OFFICE VISIT (OUTPATIENT)
Dept: UROGYNECOLOGY | Facility: CLINIC | Age: 48
End: 2023-10-17
Payer: COMMERCIAL

## 2023-10-17 VITALS
HEIGHT: 62 IN | DIASTOLIC BLOOD PRESSURE: 51 MMHG | BODY MASS INDEX: 20.36 KG/M2 | HEART RATE: 78 BPM | SYSTOLIC BLOOD PRESSURE: 108 MMHG

## 2023-10-17 DIAGNOSIS — N30.10 INTERSTITIAL CYSTITIS: ICD-10-CM

## 2023-10-17 DIAGNOSIS — R10.2 PELVIC PRESSURE IN FEMALE: ICD-10-CM

## 2023-10-17 DIAGNOSIS — N34.2 URETHRITIS: Primary | ICD-10-CM

## 2023-10-17 PROCEDURE — 3078F PR MOST RECENT DIASTOLIC BLOOD PRESSURE < 80 MM HG: ICD-10-PCS | Mod: CPTII,S$GLB,, | Performed by: OBSTETRICS & GYNECOLOGY

## 2023-10-17 PROCEDURE — 1159F PR MEDICATION LIST DOCUMENTED IN MEDICAL RECORD: ICD-10-PCS | Mod: CPTII,S$GLB,, | Performed by: OBSTETRICS & GYNECOLOGY

## 2023-10-17 PROCEDURE — 3008F BODY MASS INDEX DOCD: CPT | Mod: CPTII,S$GLB,, | Performed by: OBSTETRICS & GYNECOLOGY

## 2023-10-17 PROCEDURE — 3074F SYST BP LT 130 MM HG: CPT | Mod: CPTII,S$GLB,, | Performed by: OBSTETRICS & GYNECOLOGY

## 2023-10-17 PROCEDURE — 3074F PR MOST RECENT SYSTOLIC BLOOD PRESSURE < 130 MM HG: ICD-10-PCS | Mod: CPTII,S$GLB,, | Performed by: OBSTETRICS & GYNECOLOGY

## 2023-10-17 PROCEDURE — 3008F PR BODY MASS INDEX (BMI) DOCUMENTED: ICD-10-PCS | Mod: CPTII,S$GLB,, | Performed by: OBSTETRICS & GYNECOLOGY

## 2023-10-17 PROCEDURE — 99999 PR PBB SHADOW E&M-EST. PATIENT-LVL IV: ICD-10-PCS | Mod: PBBFAC,,, | Performed by: OBSTETRICS & GYNECOLOGY

## 2023-10-17 PROCEDURE — 3078F DIAST BP <80 MM HG: CPT | Mod: CPTII,S$GLB,, | Performed by: OBSTETRICS & GYNECOLOGY

## 2023-10-17 PROCEDURE — 1159F MED LIST DOCD IN RCRD: CPT | Mod: CPTII,S$GLB,, | Performed by: OBSTETRICS & GYNECOLOGY

## 2023-10-17 PROCEDURE — 99999 PR PBB SHADOW E&M-EST. PATIENT-LVL IV: CPT | Mod: PBBFAC,,, | Performed by: OBSTETRICS & GYNECOLOGY

## 2023-10-17 PROCEDURE — 99214 PR OFFICE/OUTPT VISIT, EST, LEVL IV, 30-39 MIN: ICD-10-PCS | Mod: S$GLB,,, | Performed by: OBSTETRICS & GYNECOLOGY

## 2023-10-17 PROCEDURE — 99214 OFFICE O/P EST MOD 30 MIN: CPT | Mod: S$GLB,,, | Performed by: OBSTETRICS & GYNECOLOGY

## 2023-10-17 RX ORDER — NORTRIPTYLINE HYDROCHLORIDE 10 MG/1
10 CAPSULE ORAL NIGHTLY
Qty: 30 CAPSULE | Refills: 11 | Status: SHIPPED | OUTPATIENT
Start: 2023-10-17 | End: 2024-10-16

## 2023-10-17 NOTE — PROGRESS NOTES
Subjective:       Patient ID: Rosa Maria Kay is a 48 y.o. female.    Chief Complaint:  INTERSTITIAL CYSTITIS (Recurring flares)    History of Present Illness  Medication Refill  Pertinent negatives include no abdominal pain, chills, coughing, diaphoresis, fatigue, fever, nausea, rash or vomiting.   Follow-up  Pertinent negatives include no abdominal pain, chills, coughing, diaphoresis, fatigue, fever, nausea, rash or vomiting.    48 Y O F P2   has a past medical history of Abnormal Pap smear of cervix (2017), Encounter for Essure implantation (2017), HPV in female (2017), Interstitial cystitis, and Puberty, precocious (1984). initially Diagnosed in 2007 by  Dr. Pena who the patient continued with until she retired.  Has seen Dr. Flood since 2016 with management of medications  which has worked: Elmiron three times a day,  flovoxate once a day, uribel daily. She also takes opinorol for restless leg syndrome.     Taking Urogesic blue c at 7:30 pm   Uro MP- 5:30 pm   Hydroxyzine 10 mg     Tried : Elavil not helpful reactions severe GI discomfort   Vaginal suppositories 2012 unclear of exact compounding      Has not tried:   Gabapentin  Bladder instillation   hydrodistention     Symptoms are mostly when she lays down when her body is relax, no burning with urination, feels a sense of relief after she voids.   Interval  HP since the last visit: last seen one week ago.   Feeling much better.  IC: pain: much improved, urinary urgency and night time urination also improved  With Hydroxyzine: Night time urination now 1-2 night.   Has been doing the 1/4 valium 10 mg tab in vagina also.       1/17/2023  Patient with bothersome pelvic pressure  Discontinued the veiscare due to side effects   Pelvic floor PT has been helpful   Using uribel and hydroxyzine which are helpful     4/11/2023  Not using the elavil due to stomache- discomfort severe cosntipation    10/17/2023:  Had a flare before July started testosterone  which she discontinued and the vacation went well  More recently she has more urethral pain   Hydroxyzine does not some cause stomach issues, but she is unable to tolerate Elavil     [facility]     Question 4/9/2020  9:39 AM CDT - Filed by Patient on 4/9/2020   General Urogynecology: Are you experiencing the following?    Dysuria (painful urination) No   Nocturia:  waking up at night to empty your bladder  Yes   If you answered yes to the previous question, how many times does this happen per night? 3-4   Enuresis (urine loss during sleep) No   Dribbling urine after you urinate No   Hematuria (urine appears red) No   Type of stream Weak   Urinary frequency: How often a day are you going to the bathroom per day?  More than 15   Urinary Tract Infections: How many Urinary Tract Infections have you had in the past year? I have not had a UTI in the past year   If you have had a UTI in the past year, what treatments have you had so far?  I have not had a UTI in the past year   Urinary Incontinence (General): Are you experiencing the following?    Past consultation for incontinence: Have you ever seen someone for the evaluation of incontinence? No   If you answered yes to the previous question, please select all the therapies you have tried.  None of the above   Please note the effectiveness of the therapies. Ineffective   Need to wear protection to keep clothes dry  No   If you answered yes to the previous question, please giselle the protection you use.  None   If you wear protection, how much wetness is typically on each pad? N/a- I do not wear protection   If you wear protection, how often do you have to change per day, if applicable?  0   Stress Symptoms: Are you experiencing the following?    Leakage of urine with cough, laugh and/or sneeze No   If you answered yes to the previous question, what is the frequency in days, weeks and/or months? Never   Leakage of urine with sex No   Leakage of urine with bending/  "lifting No   Leakage of urine with briskly walking or jogging No   If you lose urine for any other reason not previously mentioned, please note it below, if applicable.     Urge Symptoms: Are you experiencing the following?    Urgency ("got to go" feeling) Yes   Urge: How frequently do you feel an urge to urinate (feeling like you "gotta go" to the bathroom and can't wait) Never   Do you experience a leakage of urine when you have a feeling of urgency?  No   Leakage of urine when unaware No   Past use of anticholinergics (medications used to treat overactive bladder) No   If you answered yes to the previous question, please giselle the anticholinergics you have used:     Have you ever used Mirbetriq (aka Mirabegron)?  No   Prolapse Symptoms: Are you experiencing any of the following?     Falling out/ Bulging/ Heaviness in the vagina No   Vaginal/ Abdominal Pain/ Pressure No   Need to strain/ Push to void No   Need to wait on the toilet before you void No   Unusual position to urinate (using your hands to push back the vaginal bulge) No   Sensation of incomplete emptying No   Past use of pessary device No   If you answered yes to the previous question, please list the devices you have used below.     Bowel Symptoms: Are you experiencing any of the following?    Constipation No   Diarrhea  No   Hematochezia (bloody stool) No   Incomplete evacuation of stool No   Involuntary loss of formed stool No   Fecal smearing/urgency No   Involuntary loss of gas No   Vaginal Symptoms: Are you experiencing any of the following?     Abnormal vaginal bleeding  No   Vaginal dryness No   Sexually active  Yes   Dyspareunia (painful intercourse) No   Estrogen use       GYN & OB History  No LMP recorded. Patient is premenopausal.   Date of Last Pap: 2019    OB History    Para Term  AB Living   2 2 1 1   2   SAB IAB Ectopic Multiple Live Births           2      # Outcome Date GA Lbr Syed/2nd Weight Sex Delivery Anes PTL Lv "   2 Term  40w0d  3.43 kg (7 lb 9 oz) M    VANESA   1   34w0d  3.005 kg (6 lb 10 oz) M CS-LTranv   VANESA     OB  Largest: c/s ;   Forceps: no  Episiotomy:      GYN  Menarche: 14  Menstrual cycle: 28/   Menopause: no  Hysterectomy: no  Ovaries: prersent    Past Medical History:   Diagnosis Date    Abnormal Pap smear of cervix     CKC done (Dr Cote @ [facility]/[place]) - normla paps after    Encounter for Essure implantation     HPV in female 2017    Interstitial cystitis     Dr. Wheeler    Puberty, precocious        Past Surgical History:   Procedure Laterality Date     SECTION      COLD KNIFE CONIZATION OF CERVIX      w/ ESSURE     COLONOSCOPY  2020    NORMAL. Repat 3 yrs --  DR Jessica Vogt     COLONOSCOPY N/A 2020    Procedure: COLONOSCOPY;  Surgeon: Jessica Vogt MD;  Location: [facility];  Service: Endoscopy;  Laterality: N/A;  CV-19 ORDERED    TUBAL LIGATION  Essure    VAGINAL BIRTH AFTER  SECTION         Current Outpatient Medications:     DAILY MULTI-VITAMIN ORAL, Take by mouth., Disp: , Rfl:     estradioL (ESTRACE) 0.01 % (0.1 mg/gram) vaginal cream, Place vaginally., Disp: , Rfl:     estradioL (ESTRACE) 2 MG tablet, Take 2 mg by mouth every morning., Disp: , Rfl:     hydrOXYzine HCL (ATARAX) 10 MG Tab, Take 1 tablet (10 mg total) by mouth 3 (three) times daily as needed (bladder pain)., Disp: 30 tablet, Rfl: 11    LIDOCAINE 2 %, VALIUM 5 MG, BACLOFEN 4 % SUPPOSITORY, Place 1 suppository vaginally 2 (two) times daily as needed (pelvic pain)., Disp: 20 each, Rfl: 5    methen-m.blue-s.phos-phsal-hyo (URIBEL) 118-10-40.8-36 mg Cap, Take 1 capsule by mouth 4 (four) times daily as needed., Disp: , Rfl:     methen-sod phos-meth blue-hyos (UROGESIC-BLUE/URYL) 81.6-40.8-0.12 mg Tab, Take 1 tablet by mouth 4 (four) times daily as needed (bladder pain)., Disp: 60 tablet, Rfl: 11    progesterone (PROMETRIUM) 200 MG capsule, Take 200 mg by mouth every  evening., Disp: , Rfl:     rOPINIRole (REQUIP) 1 MG tablet, Take 1 tablet (1 mg total) by mouth 2 (two) times daily., Disp: 180 tablet, Rfl: 3    TAZORAC 0.05 % Crea cream, Apply 1 application topically nightly., Disp: , Rfl:     nortriptyline (PAMELOR) 10 MG capsule, Take 1 capsule (10 mg total) by mouth every evening., Disp: 30 capsule, Rfl: 11      Review of Systems  Review of Systems   Constitutional: Negative.  Negative for activity change, appetite change, chills, diaphoresis, fatigue, fever and unexpected weight change.   HENT: Negative.     Eyes: Negative.    Respiratory: Negative.  Negative for cough.    Cardiovascular: Negative.    Gastrointestinal:  Negative for abdominal distention, abdominal pain, anal bleeding, blood in stool, constipation, diarrhea, nausea, rectal pain and vomiting.   Endocrine: Negative.    Genitourinary:  Positive for dysuria and urgency. Negative for decreased urine volume, difficulty urinating, dyspareunia, enuresis, flank pain, frequency, genital sores, hematuria, menstrual problem, pelvic pain, vaginal bleeding, vaginal discharge and vaginal pain.        Night time urination    Musculoskeletal:  Negative for back pain.   Skin:  Negative for color change, pallor, rash and wound.   Allergic/Immunologic: Negative for environmental allergies, food allergies and immunocompromised state.   Hematological:  Negative for adenopathy. Does not bruise/bleed easily.   Psychiatric/Behavioral:  Negative for agitation, behavioral problems, confusion and sleep disturbance.            Objective:     Physical Exam   Constitutional: She is oriented to person, place, and time. She appears well-developed and well-nourished.   HENT:   Head: Normocephalic and atraumatic.   Eyes: Conjunctivae and EOM are normal.   Cardiovascular: Normal rate, regular rhythm, S1 normal, S2 normal, normal heart sounds and intact distal pulses.   Pulmonary/Chest: Effort normal and breath sounds normal. No respiratory  distress. She has no wheezes. She exhibits no tenderness.   Abdominal: Soft. Bowel sounds are normal. She exhibits no distension and no mass. There is no splenomegaly or hepatomegaly. There is no abdominal tenderness. There is no rigidity, no rebound and no guarding. No hernia.   Genitourinary: Pelvic exam was performed with patient supine. Rectum normal, vagina normal, cervix normal, skenes normal and bartholins normal. Right labia normal and left labia normal. Urethra exhibits no urethral caruncle, no urethral diverticulum and no urethral mass. Right bartholin is not enlarged and not tender. Left bartholin is not enlarged and not tender. Rectal exam shows resting tone normal and active tone normal. Rectal exam shows no external hemorrhoid, no fissure, no tenderness, anal tone normal and no dovetailing. Guaiac negative stool. There is lavator tenderness in the vagina. No foreign body, tenderness, bleeding, unspecified prolapse of vaginal walls, fistula or mesh exposure in the vagina. Right adnexum displays no mass and no tenderness. Left adnexum displays no mass and no tenderness. Cervix exhibits no motion tenderness and no discharge. Uterus is tender. Uterus is not experiencing uterine prolapse.   PVR: 10 ML  Empty cough stress test: Negative.  Kegel: 1/5    POP-Q  Aa: -1 Ba: -1 C: -5   GH: 3 PB: 2 TVL: 8   Ap: -2 Bp: -2 D: -6                      Genitourinary Comments: No bladder base tenderness  Levators not tender      Musculoskeletal:         General: Normal range of motion.      Cervical back: Normal range of motion and neck supple.   Neurological: She is alert and oriented to person, place, and time. She has normal strength, normal reflexes and intact cranial nerves (2-12). Cranial nerves II through XII intact. No cranial nerve deficit.   Skin: Skin is warm and dry.   Psychiatric: She has a normal mood and affect. Her speech is normal and behavior is normal. Judgment and thought content normal.           Assessment:        1. Urethritis    2. Pelvic pressure in female    3. Interstitial cystitis                     Plan:         IC  --Prelief to help alkinize the urine:   --Prelief Tablets : Each tablet contains 345 mg calcium glycerophosphate (65 mg of elemental calcium). The tablets also contain 0.25% magnesium  stearate as a processing aid. Two tablets are equivalent to 690 mg calcium glycerophosphate (130mg of elemental calcium).   --Prelief Powder : Each ¼ teaspoon usage of powder is comparable to two tablets. The powder dissolves rapidly in food or non-alcoholic beverages.  Tablets are recommended for taking with alcoholic beverages   --Usage:     --Tablets: 2-3 tablets, 2 times a day.    --Powder: ¼ teaspoon of powder 2 times a day. More can be used when needed  --The IC diet:  https://www.ichelp.org/wp-content/uploads/2015/07/food-list.pdf   -- Continue Hydroxyzine 10 mg at night for relief of bladder pain- will add colace twice a day if no improvement will stop hydroxyzine and start elavil 10 mg at night.   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 Start nortriptyline 10 mg    3. Vaginal atrophy (dryness):  Use 1 gram of estrogen cream in vagina nightly x 2 weeks, then twice a week thereafter.     4. Urethral pain   Mycoplasma and ureaplasma     Yeny Silva DO  Female Pelvic Medicine and Reconstructive Surgery  [facility] [place], [place]

## 2023-11-18 ENCOUNTER — PATIENT MESSAGE (OUTPATIENT)
Dept: UROGYNECOLOGY | Facility: CLINIC | Age: 48
End: 2023-11-18
Payer: COMMERCIAL

## 2023-11-27 ENCOUNTER — OFFICE VISIT (OUTPATIENT)
Dept: UROGYNECOLOGY | Facility: CLINIC | Age: 48
End: 2023-11-27
Payer: COMMERCIAL

## 2023-11-27 DIAGNOSIS — N30.10 INTERSTITIAL CYSTITIS: Primary | ICD-10-CM

## 2023-11-27 PROCEDURE — 99213 PR OFFICE/OUTPT VISIT, EST, LEVL III, 20-29 MIN: ICD-10-PCS | Mod: 95,,, | Performed by: OBSTETRICS & GYNECOLOGY

## 2023-11-27 PROCEDURE — 99213 OFFICE O/P EST LOW 20 MIN: CPT | Mod: 95,,, | Performed by: OBSTETRICS & GYNECOLOGY

## 2023-11-27 NOTE — PROGRESS NOTES
Subjective:       Patient ID: Rosa Maria Kay is a 48 y.o. female.    Chief Complaint:  No chief complaint on file.    The patient location is: [place]  The chief complaint leading to consultation is: IC    Visit type: audiovisual    Face to Face time with patient: 20   minutes of total time spent on the encounter, which includes face to face time and non-face to face time preparing to see the patient (eg, review of tests), Obtaining and/or reviewing separately obtained history, Documenting clinical information in the electronic or other health record, Independently interpreting results (not separately reported) and communicating results to the patient/family/caregiver, or Care coordination (not separately reported).         Each patient to whom he or she provides medical services by telemedicine is:  (1) informed of the relationship between the physician and patient and the respective role of any other health care provider with respect to management of the patient; and (2) notified that he or she may decline to receive medical services by telemedicine and may withdraw from such care at any time.    Notes:     History of Present Illness  Medication Refill  Pertinent negatives include no abdominal pain, chills, coughing, diaphoresis, fatigue, fever, nausea, rash or vomiting.   Follow-up  Pertinent negatives include no abdominal pain, chills, coughing, diaphoresis, fatigue, fever, nausea, rash or vomiting.    48 Y O F P2   has a past medical history of Abnormal Pap smear of cervix (2017), Encounter for Essure implantation (2017), HPV in female (2017), Interstitial cystitis, and Puberty, precocious (1984). initially Diagnosed in 2007 by  Dr. Pena who the patient continued with until she retired.  Has seen Dr. Flood since 2016 with management of medications  which has worked: Elmiron three times a day,  flovoxate once a day, uribel daily. She also takes opinorol for restless leg syndrome.     Taking Urogesic blue c  at 7:30 pm   Uro MP- 5:30 pm   Hydroxyzine 10 mg     Tried : Elavil not helpful reactions severe GI discomfort   Vaginal suppositories 2012 unclear of exact compounding      Has not tried:   Gabapentin  Bladder instillation   hydrodistention     Symptoms are mostly when she lays down when her body is relax, no burning with urination, feels a sense of relief after she voids.   Interval  HP since the last visit: last seen one week ago.   Feeling much better.  IC: pain: much improved, urinary urgency and night time urination also improved  With Hydroxyzine: Night time urination now 1-2 night.   Has been doing the 1/4 valium 10 mg tab in vagina also.       1/17/2023  Patient with bothersome pelvic pressure  Discontinued the veiscare due to side effects   Pelvic floor PT has been helpful   Using uribel and hydroxyzine which are helpful     4/11/2023  Not using the elavil due to stomache- discomfort severe cosntipation    10/17/2023:  Had a flare before July started testosterone which she discontinued and the vacation went well  More recently she has more urethral pain   Hydroxyzine does not some cause stomach issues, but she is unable to tolerate Elavil       11/27/2023:  Patient with improved symptoms   Urinary  urgency and frequency   Ureaplasma ordered at last visit was never processed symptoms now resolved     Ohs Peq Urogyn Hpi     Question 4/9/2020  9:39 AM CDT - Filed by Patient on 4/9/2020   General Urogynecology: Are you experiencing the following?    Dysuria (painful urination) No   Nocturia:  waking up at night to empty your bladder  Yes   If you answered yes to the previous question, how many times does this happen per night? 3-4   Enuresis (urine loss during sleep) No   Dribbling urine after you urinate No   Hematuria (urine appears red) No   Type of stream Weak   Urinary frequency: How often a day are you going to the bathroom per day?  More than 15   Urinary Tract Infections: How many Urinary Tract  "Infections have you had in the past year? I have not had a UTI in the past year   If you have had a UTI in the past year, what treatments have you had so far?  I have not had a UTI in the past year   Urinary Incontinence (General): Are you experiencing the following?    Past consultation for incontinence: Have you ever seen someone for the evaluation of incontinence? No   If you answered yes to the previous question, please select all the therapies you have tried.  None of the above   Please note the effectiveness of the therapies. Ineffective   Need to wear protection to keep clothes dry  No   If you answered yes to the previous question, please giselle the protection you use.  None   If you wear protection, how much wetness is typically on each pad? N/a- I do not wear protection   If you wear protection, how often do you have to change per day, if applicable?  0   Stress Symptoms: Are you experiencing the following?    Leakage of urine with cough, laugh and/or sneeze No   If you answered yes to the previous question, what is the frequency in days, weeks and/or months? Never   Leakage of urine with sex No   Leakage of urine with bending/ lifting No   Leakage of urine with briskly walking or jogging No   If you lose urine for any other reason not previously mentioned, please note it below, if applicable.     Urge Symptoms: Are you experiencing the following?    Urgency ("got to go" feeling) Yes   Urge: How frequently do you feel an urge to urinate (feeling like you "gotta go" to the bathroom and can't wait) Never   Do you experience a leakage of urine when you have a feeling of urgency?  No   Leakage of urine when unaware No   Past use of anticholinergics (medications used to treat overactive bladder) No   If you answered yes to the previous question, please giselle the anticholinergics you have used:     Have you ever used Mirbetriq (aka Mirabegron)?  No   Prolapse Symptoms: Are you experiencing any of the following?   "   Falling out/ Bulging/ Heaviness in the vagina No   Vaginal/ Abdominal Pain/ Pressure No   Need to strain/ Push to void No   Need to wait on the toilet before you void No   Unusual position to urinate (using your hands to push back the vaginal bulge) No   Sensation of incomplete emptying No   Past use of pessary device No   If you answered yes to the previous question, please list the devices you have used below.     Bowel Symptoms: Are you experiencing any of the following?    Constipation No   Diarrhea  No   Hematochezia (bloody stool) No   Incomplete evacuation of stool No   Involuntary loss of formed stool No   Fecal smearing/urgency No   Involuntary loss of gas No   Vaginal Symptoms: Are you experiencing any of the following?     Abnormal vaginal bleeding  No   Vaginal dryness No   Sexually active  Yes   Dyspareunia (painful intercourse) No   Estrogen use       GYN & OB History  No LMP recorded. Patient is premenopausal.   Date of Last Pap: 2019    OB History    Para Term  AB Living   2 2 1 1   2   SAB IAB Ectopic Multiple Live Births           2      # Outcome Date GA Lbr Syed/2nd Weight Sex Delivery Anes PTL Lv   2 Term  40w0d  3.43 kg (7 lb 9 oz) M    VANESA   1   34w0d  3.005 kg (6 lb 10 oz) M CS-LTranv   VANESA     OB  Largest: c/s ;   Forceps: no  Episiotomy:      GYN  Menarche: 14  Menstrual cycle: 30/   Menopause: no  Hysterectomy: no  Ovaries: prersent    Past Medical History:   Diagnosis Date    Abnormal Pap smear of cervix 2017    CKC done (Dr Cote @ [facility]/[employer]) - normla paps after    Encounter for Essure implantation 2017    HPV in female 2017    Interstitial cystitis     Dr. Wheeler    Puberty, precocious        Past Surgical History:   Procedure Laterality Date     SECTION      COLD KNIFE CONIZATION OF CERVIX  2017    w/ ESSURE     COLONOSCOPY  2020    NORMAL. Repat 3 yrs --  DR Jessica Vogt     COLONOSCOPY N/A 2020    Procedure:  COLONOSCOPY;  Surgeon: Jessica Vogt MD;  Location: [facility];  Service: Endoscopy;  Laterality: N/A;  CV-19 ORDERED    TUBAL LIGATION  Essure    VAGINAL BIRTH AFTER  SECTION         Current Outpatient Medications:     DAILY MULTI-VITAMIN ORAL, Take by mouth., Disp: , Rfl:     estradioL (ESTRACE) 0.01 % (0.1 mg/gram) vaginal cream, Place vaginally., Disp: , Rfl:     estradioL (ESTRACE) 2 MG tablet, Take 2 mg by mouth every morning., Disp: , Rfl:     hydrOXYzine HCL (ATARAX) 10 MG Tab, Take 1 tablet (10 mg total) by mouth 3 (three) times daily as needed (bladder pain)., Disp: 30 tablet, Rfl: 11    LIDOCAINE 2 %, VALIUM 5 MG, BACLOFEN 4 % SUPPOSITORY, Place 1 suppository vaginally 2 (two) times daily as needed (pelvic pain)., Disp: 20 each, Rfl: 5    methen-m.blue-s.phos-phsal-hyo (URIBEL) 118-10-40.8-36 mg Cap, Take 1 capsule by mouth 4 (four) times daily as needed., Disp: , Rfl:     methen-sod phos-meth blue-hyos (UROGESIC-BLUE/URYL) 81.6-40.8-0.12 mg Tab, Take 1 tablet by mouth 4 (four) times daily as needed (bladder pain)., Disp: 60 tablet, Rfl: 11    nortriptyline (PAMELOR) 10 MG capsule, Take 1 capsule (10 mg total) by mouth every evening., Disp: 30 capsule, Rfl: 11    progesterone (PROMETRIUM) 200 MG capsule, Take 200 mg by mouth every evening., Disp: , Rfl:     rOPINIRole (REQUIP) 1 MG tablet, Take 1 tablet (1 mg total) by mouth 2 (two) times daily., Disp: 180 tablet, Rfl: 3    TAZORAC 0.05 % Crea cream, Apply 1 application topically nightly., Disp: , Rfl:       Review of Systems  Review of Systems   Constitutional: Negative.  Negative for activity change, appetite change, chills, diaphoresis, fatigue, fever and unexpected weight change.   HENT: Negative.     Eyes: Negative.    Respiratory: Negative.  Negative for cough.    Cardiovascular: Negative.    Gastrointestinal:  Negative for abdominal distention, abdominal pain, anal bleeding, blood in stool, constipation, diarrhea, nausea, rectal pain and  vomiting.   Endocrine: Negative.    Genitourinary:  Positive for dysuria and urgency. Negative for decreased urine volume, difficulty urinating, dyspareunia, enuresis, flank pain, frequency, genital sores, hematuria, menstrual problem, pelvic pain, vaginal bleeding, vaginal discharge and vaginal pain.        Night time urination    Musculoskeletal:  Negative for back pain.   Skin:  Negative for color change, pallor, rash and wound.   Allergic/Immunologic: Negative for environmental allergies, food allergies and immunocompromised state.   Hematological:  Negative for adenopathy. Does not bruise/bleed easily.   Psychiatric/Behavioral:  Negative for agitation, behavioral problems, confusion and sleep disturbance.            Objective:     Physical Exam   Constitutional: She is oriented to person, place, and time. She appears well-developed and well-nourished.   HENT:   Head: Normocephalic and atraumatic.   Pulmonary/Chest: No respiratory distress. She has no wheezes.   Musculoskeletal:         General: Normal range of motion.      Cervical back: Normal range of motion and neck supple.   Neurological: She is alert and oriented to person, place, and time.   Skin: No cyanosis. Nails show no clubbing.   Psychiatric: She has a normal mood and affect. Her behavior is normal. Judgment and thought content normal.          Assessment:        1. Interstitial cystitis                       Plan:         IC  --Prelief to help alkinize the urine:   --Prelief Tablets : Each tablet contains 345 mg calcium glycerophosphate (65 mg of elemental calcium). The tablets also contain 0.25% magnesium  stearate as a processing aid. Two tablets are equivalent to 690 mg calcium glycerophosphate (130mg of elemental calcium).   --Prelief Powder : Each ¼ teaspoon usage of powder is comparable to two tablets. The powder dissolves rapidly in food or non-alcoholic beverages.  Tablets are recommended for taking with alcoholic beverages   --Usage:      --Tablets: 2-3 tablets, 2 times a day.    --Powder: ¼ teaspoon of powder 2 times a day. 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 Vaginal atrophy (dryness):  Use 1 gram of estrogen cream in vagina twice a week .     Yeny Silva DO  Female Pelvic Medicine and Reconstructive Surgery  [facility] [place], [place]

## 2023-12-21 ENCOUNTER — PATIENT MESSAGE (OUTPATIENT)
Dept: UROGYNECOLOGY | Facility: CLINIC | Age: 48
End: 2023-12-21
Payer: COMMERCIAL

## 2024-01-11 ENCOUNTER — HOSPITAL ENCOUNTER (OUTPATIENT)
Dept: RADIOLOGY | Facility: HOSPITAL | Age: 49
Discharge: HOME | End: 2024-01-11
Attending: FAMILY MEDICINE
Payer: COMMERCIAL

## 2024-01-11 DIAGNOSIS — Z12.31 SCREENING MAMMOGRAM FOR HIGH-RISK PATIENT: ICD-10-CM

## 2024-01-11 PROCEDURE — 77063 BREAST TOMOSYNTHESIS BI: CPT | Mod: 26,,, | Performed by: RADIOLOGY

## 2024-01-11 PROCEDURE — 77067 SCR MAMMO BI INCL CAD: CPT | Mod: 26,,, | Performed by: RADIOLOGY

## 2024-01-11 PROCEDURE — 77067 SCR MAMMO BI INCL CAD: CPT | Mod: TC

## 2024-03-11 ENCOUNTER — E-VISIT (OUTPATIENT)
Dept: FAMILY MEDICINE | Facility: CLINIC | Age: 49
End: 2024-03-11
Payer: COMMERCIAL

## 2024-03-11 DIAGNOSIS — R51.9 NONINTRACTABLE HEADACHE, UNSPECIFIED CHRONICITY PATTERN, UNSPECIFIED HEADACHE TYPE: ICD-10-CM

## 2024-03-11 DIAGNOSIS — H66.90 OTITIS MEDIA, UNSPECIFIED LATERALITY, UNSPECIFIED OTITIS MEDIA TYPE: Primary | ICD-10-CM

## 2024-03-11 PROCEDURE — 99422 OL DIG E/M SVC 11-20 MIN: CPT | Mod: ,,, | Performed by: STUDENT IN AN ORGANIZED HEALTH CARE EDUCATION/TRAINING PROGRAM

## 2024-03-11 RX ORDER — NAPROXEN 500 MG/1
500 TABLET ORAL 2 TIMES DAILY PRN
Qty: 14 TABLET | Refills: 0 | Status: SHIPPED | OUTPATIENT
Start: 2024-03-11 | End: 2024-03-18

## 2024-03-11 RX ORDER — AMOXICILLIN AND CLAVULANATE POTASSIUM 875; 125 MG/1; MG/1
1 TABLET, FILM COATED ORAL 2 TIMES DAILY
Qty: 14 TABLET | Refills: 0 | Status: SHIPPED | OUTPATIENT
Start: 2024-03-11 | End: 2024-03-18

## 2024-03-11 NOTE — PROGRESS NOTES
Patient ID: Rosa Maria Kay is a 49 y.o. female.    Chief Complaint: URI (Entered automatically based on patient selection in Patient Portal.)          274}  The patient initiated a request through [employer] on 3/11/2024 for evaluation and management with a chief complaint of URI (Entered automatically based on patient selection in Patient Portal.)     I evaluated the questionnaire submission on 03/11/2024 .    Total Time (in minutes): 13     Ohs Peq Evisit Upper Respitatory/Cough Questionnaire    3/11/2024  8:24 AM CDT - Filed by Patient   Do you agree to participate in an E-Visit? Yes   If you have any of the following symptoms, please present to your local ER or call 911:  I acknowledge   What is the main issue that you would like for your doctor to address today? Ear ache/ pain   Are you able to take your vital signs? No   Are you currently pregnant, could you be pregnant, or are you breast feeding? None of the above   What symptoms do you currently have?  Headache;  Ear pain   Have you ever smoked? I have never smoked   Have you had a fever? No   When did your symptoms first appear? 3/9/2024   In the last two weeks, have you been in close contact with someone who has COVID-19 or the Flu? No   In the last two weeks, have you worked or volunteered in a healthcare facility or as a ? Healthcare facilities include a hospital, medical or dental clinic, long-term care facility, or nursing home No   Do you live in a long-term care facility, nursing home, group home, or homeless shelter? No   List what you have done or taken to help your symptoms. Advil   How severe are your symptoms? Moderate   Have your symptoms improved since they first appeared? No change   Have you taken an at home Covid test? No   Have you taken a Flu test? No   Have you been fully vaccinated for COVID? (2 Pfizer, 2 Moderna or 1 Abdiaziz & Abdiaziz vaccine injections) Yes   Have you received a booster? Yes   Have you recieved a Flu  shot? No   Do you have transportation to get tested for COVID if it is indicated and ordered for you at an [facility] location? Yes   Provide any information you feel is important to your history not asked above    Please attach any relevant images or files           Active Problem List with Overview Notes    Diagnosis Date Noted    Lumbar radiculopathy 03/20/2023    Lumbar spondylosis 03/20/2023    DDD (degenerative disc disease), lumbar 03/20/2023    Myalgia of pelvic floor 01/20/2023    Interstitial cystitis      Dr. Wheeler        Recent Labs Obtained:  Lab Results   Component Value Date    WBC 3.95 09/23/2023    HGB 11.8 (L) 09/23/2023    HCT 38.0 09/23/2023     (H) 09/23/2023     09/23/2023     09/23/2023    K 3.9 09/23/2023    GLU 81 09/23/2023    CREATININE 0.9 09/23/2023    EGFRNORACEVR >60.0 09/23/2023    HGBA1C 5.5 06/20/2017      Review of patient's allergies indicates:  No Known Allergies    Encounter Diagnoses   Name Primary?    Otitis media, unspecified laterality, unspecified otitis media type Yes    Nonintractable headache, unspecified chronicity pattern, unspecified headache type         No orders of the defined types were placed in this encounter.     Medications Ordered This Encounter   Medications    amoxicillin-clavulanate 875-125mg (AUGMENTIN) 875-125 mg per tablet     Sig: Take 1 tablet by mouth 2 (two) times daily. for 7 days     Dispense:  14 tablet     Refill:  0    naproxen (NAPROSYN) 500 MG tablet     Sig: Take 1 tablet (500 mg total) by mouth 2 (two) times daily as needed (pain).     Dispense:  14 tablet     Refill:  0        E-Visit Time Tracking:    Day 1 Time (in minutes): 13    Total Time (in minutes): 13      274}

## 2024-04-04 ENCOUNTER — PATIENT MESSAGE (OUTPATIENT)
Dept: FAMILY MEDICINE | Facility: CLINIC | Age: 49
End: 2024-04-04
Payer: COMMERCIAL

## 2024-04-05 ENCOUNTER — OFFICE VISIT (OUTPATIENT)
Dept: FAMILY MEDICINE | Facility: CLINIC | Age: 49
End: 2024-04-05
Payer: COMMERCIAL

## 2024-04-05 VITALS
SYSTOLIC BLOOD PRESSURE: 112 MMHG | WEIGHT: 115.06 LBS | TEMPERATURE: 98 F | HEIGHT: 62 IN | HEART RATE: 75 BPM | OXYGEN SATURATION: 98 % | BODY MASS INDEX: 21.17 KG/M2 | DIASTOLIC BLOOD PRESSURE: 62 MMHG

## 2024-04-05 DIAGNOSIS — E28.39 PREMATURE OVARIAN FAILURE: Primary | ICD-10-CM

## 2024-04-05 PROCEDURE — 3008F BODY MASS INDEX DOCD: CPT | Mod: CPTII,S$GLB,, | Performed by: FAMILY MEDICINE

## 2024-04-05 PROCEDURE — 99999 PR PBB SHADOW E&M-EST. PATIENT-LVL IV: CPT | Mod: PBBFAC,,, | Performed by: FAMILY MEDICINE

## 2024-04-05 PROCEDURE — 99213 OFFICE O/P EST LOW 20 MIN: CPT | Mod: S$GLB,,, | Performed by: FAMILY MEDICINE

## 2024-04-05 PROCEDURE — 3078F DIAST BP <80 MM HG: CPT | Mod: CPTII,S$GLB,, | Performed by: FAMILY MEDICINE

## 2024-04-05 PROCEDURE — 3074F SYST BP LT 130 MM HG: CPT | Mod: CPTII,S$GLB,, | Performed by: FAMILY MEDICINE

## 2024-04-05 PROCEDURE — 1159F MED LIST DOCD IN RCRD: CPT | Mod: CPTII,S$GLB,, | Performed by: FAMILY MEDICINE

## 2024-04-05 RX ORDER — ESTRADIOL 0.25 MG/.25G
GEL TOPICAL
COMMUNITY
Start: 2023-12-21

## 2024-04-05 RX ORDER — FLUORIDE (SODIUM) 1.1 %
PASTE (ML) DENTAL
COMMUNITY
Start: 2024-04-04

## 2024-04-05 RX ORDER — TRETINOIN 0.5 MG/G
CREAM TOPICAL
COMMUNITY
Start: 2024-01-23

## 2024-04-05 NOTE — PROGRESS NOTES
Subjective     Patient ID: Rosa Maria Kay is a 49 y.o. female.    Chief Complaint: No chief complaint on file.    49-year-old female presents today for 2nd opinion.  She had blood work done with her OBGYN, Dr. Tinsley.  She is noted to be in premature menopause with an estrogen level of 9.  Her FSH level is also elevated as well.  She is concerned about starting hormone therapy as she is gained 4 lb while being on it.  She is concerned about the weight gain.  She states she feels bloated and tight on the medication.  She is here to get my opinion on restarting hormone replacement.        Review of Systems       Objective     Physical Exam       Assessment and Plan     1. Premature ovarian failure        Will discuss restarting hormone replacement with her OB, Dr. Tinsley.         No follow-ups on file.

## 2024-04-08 ENCOUNTER — PATIENT MESSAGE (OUTPATIENT)
Dept: FAMILY MEDICINE | Facility: CLINIC | Age: 49
End: 2024-04-08
Payer: COMMERCIAL

## 2024-04-17 ENCOUNTER — PATIENT MESSAGE (OUTPATIENT)
Dept: FAMILY MEDICINE | Facility: CLINIC | Age: 49
End: 2024-04-17
Payer: COMMERCIAL

## 2024-06-06 ENCOUNTER — PATIENT MESSAGE (OUTPATIENT)
Dept: FAMILY MEDICINE | Facility: CLINIC | Age: 49
End: 2024-06-06
Payer: COMMERCIAL

## 2024-06-07 ENCOUNTER — PATIENT MESSAGE (OUTPATIENT)
Dept: FAMILY MEDICINE | Facility: CLINIC | Age: 49
End: 2024-06-07
Payer: COMMERCIAL

## 2024-06-21 ENCOUNTER — TELEPHONE (OUTPATIENT)
Dept: OBSTETRICS AND GYNECOLOGY | Facility: CLINIC | Age: 49
End: 2024-06-21
Payer: COMMERCIAL

## 2024-07-26 ENCOUNTER — PATIENT MESSAGE (OUTPATIENT)
Dept: FAMILY MEDICINE | Facility: CLINIC | Age: 49
End: 2024-07-26
Payer: COMMERCIAL

## 2024-07-26 ENCOUNTER — E-VISIT (OUTPATIENT)
Dept: INTERNAL MEDICINE | Facility: CLINIC | Age: 49
End: 2024-07-26
Payer: COMMERCIAL

## 2024-07-26 DIAGNOSIS — U07.1 COVID: Primary | ICD-10-CM

## 2024-07-26 RX ORDER — AZELASTINE 1 MG/ML
1 SPRAY, METERED NASAL 2 TIMES DAILY
Qty: 30 ML | Refills: 0 | Status: SHIPPED | OUTPATIENT
Start: 2024-07-26 | End: 2025-07-26

## 2024-07-26 RX ORDER — FLUTICASONE PROPIONATE 50 MCG
1 SPRAY, SUSPENSION (ML) NASAL DAILY
Qty: 16 G | Refills: 0 | Status: SHIPPED | OUTPATIENT
Start: 2024-07-26

## 2024-07-26 NOTE — PROGRESS NOTES
Patient ID: Rosa Maria Kay is a 49 y.o. female.    Chief Complaint: URI (Entered automatically based on patient selection in [facility].)    The patient initiated a request through [employer] on 2024 for evaluation and management with a chief complaint of URI (Entered automatically based on patient selection in [facility].)     I evaluated the questionnaire submission on 24.    Ohs Peq E-Visit Covid    2024 10:52 AM CDT - Filed by Patient   Do you agree to participate in an E-Visit? Yes   If you have any of the following symptoms, go to your local emergency room or call 911: I acknowledge   Are you pregnant, could you be pregnant, or are you breast feeding? None of the above   What is the main issue you would like addressed today? Head congestion with lots of fluid in my ears. I feel like i am under water.   Do you think you might have COVID or the Flu? Yes COVID   Have you tested positive for COVID or Flu? Yes COVID   What symptoms do you have? Nasal congestion   When did your symptoms first appear? 2024   List what you have done or taken to help your symptoms. Taking claritan   Provide any additional information you feel is important. I took an  home COVID test and it was positive after running fever on . I inly ran fever for about 2 hours. Since then I have been  nasally. I feel like i have a lot of fluid and have been tsking a claritan every 12 hours. Is there anything else i can take to relieve the fluid feeling.   Please attach any relevant images or files    Are you able to take your vital signs? No         Encounter Diagnosis   Name Primary?    COVID Yes        No orders of the defined types were placed in this encounter.     Medications Ordered This Encounter   Medications    azelastine (ASTELIN) 137 mcg (0.1 %) nasal spray     Si spray (137 mcg total) by Nasal route 2 (two) times daily.     Dispense:  30 mL     Refill:  0    fluticasone propionate (FLONASE) 50 mcg/actuation  nasal spray     Si spray (50 mcg total) by Each Nostril route once daily.     Dispense:  16 g     Refill:  0        No follow-ups on file.      E-Visit Time Tracking:    Day 1 Time (in minutes): 5    Total Time (in minutes): 5

## 2024-08-01 ENCOUNTER — OFFICE VISIT (OUTPATIENT)
Dept: OBSTETRICS AND GYNECOLOGY | Facility: CLINIC | Age: 49
End: 2024-08-01
Payer: COMMERCIAL

## 2024-08-01 VITALS
HEIGHT: 62 IN | BODY MASS INDEX: 21.42 KG/M2 | DIASTOLIC BLOOD PRESSURE: 64 MMHG | WEIGHT: 116.38 LBS | SYSTOLIC BLOOD PRESSURE: 104 MMHG

## 2024-08-01 DIAGNOSIS — Z01.419 WELL WOMAN EXAM WITH ROUTINE GYNECOLOGICAL EXAM: Primary | ICD-10-CM

## 2024-08-01 DIAGNOSIS — Z12.4 SCREENING FOR CERVICAL CANCER: ICD-10-CM

## 2024-08-01 PROCEDURE — 99999 PR PBB SHADOW E&M-EST. PATIENT-LVL III: CPT | Mod: PBBFAC,,, | Performed by: OBSTETRICS & GYNECOLOGY

## 2024-08-01 PROCEDURE — 3008F BODY MASS INDEX DOCD: CPT | Mod: CPTII,S$GLB,, | Performed by: OBSTETRICS & GYNECOLOGY

## 2024-08-01 PROCEDURE — 3074F SYST BP LT 130 MM HG: CPT | Mod: CPTII,S$GLB,, | Performed by: OBSTETRICS & GYNECOLOGY

## 2024-08-01 PROCEDURE — 87624 HPV HI-RISK TYP POOLED RSLT: CPT | Performed by: OBSTETRICS & GYNECOLOGY

## 2024-08-01 PROCEDURE — 3078F DIAST BP <80 MM HG: CPT | Mod: CPTII,S$GLB,, | Performed by: OBSTETRICS & GYNECOLOGY

## 2024-08-01 PROCEDURE — 99396 PREV VISIT EST AGE 40-64: CPT | Mod: S$GLB,,, | Performed by: OBSTETRICS & GYNECOLOGY

## 2024-08-01 PROCEDURE — 1159F MED LIST DOCD IN RCRD: CPT | Mod: CPTII,S$GLB,, | Performed by: OBSTETRICS & GYNECOLOGY

## 2024-08-01 NOTE — PROGRESS NOTES
Subjective     Patient ID: Rosa Maira Kay is a 49 y.o. female.    Chief Complaint:  Well Woman      History of Present Illness  50yo  presents for annual exam.  Overall doing well today.  Menopausal for a few years, denies PMB.  Previously seen by Dr. Tinsley and started on HRT due to young age of menopause.  Also reports Dexa scan with possible osteopenia.  She has tried oral tablet (increased from 1 mg to 2mg, but stopped due to fluid retention), testosterone shots (wt gain), progesterone for sleeping (no improvement), etc.  States she stopped all of this a while ago because of side effects and feels better off HRT altogether.  HF/NS not bothersome.  Started OTC S-equol (soy) and is actually feeling much better, better sleep as well.      No other complaints today.  She does report problems with constipation, but recently has been having daily BM.  Colonoscopy in  (rpt 5 years).  Up to date on MMG.  H/o CKC in 2017 (Essure at time of sx).  Last pap in  wnl.      H/o Essure and CKC in 2017.  Last pap wnl.  Up to date on MMG and colonoscopy.      Gynecologic Exam  The patient's pertinent negatives include no genital itching, genital lesions, genital odor, genital rash, missed menses, pelvic pain, vaginal bleeding or vaginal discharge. She is not pregnant. Associated symptoms include constipation, frequency and painful intercourse. Pertinent negatives include no abdominal pain, anorexia, back pain, chills, diarrhea, discolored urine, dysuria, fever, flank pain, headaches, hematuria, nausea, rash, urgency or vomiting. There has been no bleeding. She has not been passing clots. She has not been passing tissue. She is not sexually active. No, her partner does not have an STD. She uses tubal ligation for contraception. She is postmenopausal. Her past medical history is significant for a  section, a gynecological surgery and a terminated pregnancy. There is no history of an abdominal surgery, an  "ectopic pregnancy, endometriosis, herpes simplex, menorrhagia, metrorrhagia, miscarriage, ovarian cysts, perineal abscess, PID, an STD or vaginosis.         GYN & OB History  No LMP recorded. Patient is premenopausal.   Date of Last Pap: 2021    OB History    Para Term  AB Living   2 2 1 1   2   SAB IAB Ectopic Multiple Live Births           2      # Outcome Date GA Lbr Syed/2nd Weight Sex Type Anes PTL Lv   2 Term  40w0d  3.43 kg (7 lb 9 oz) M    VANESA   1   34w0d  3.005 kg (6 lb 10 oz) M CS-LTranv   VANESA       Review of Systems  Review of Systems   Constitutional:  Negative for chills and fever.   Respiratory:  Negative for shortness of breath.    Cardiovascular:  Negative for chest pain.   Gastrointestinal:  Positive for constipation. Negative for abdominal pain, anorexia, diarrhea, nausea and vomiting.   Genitourinary:  Positive for frequency. Negative for dysuria, flank pain, hematuria, menorrhagia, missed menses, pelvic pain, urgency and vaginal discharge.   Musculoskeletal:  Negative for back pain.   Integumentary:  Negative for rash.   Neurological:  Negative for headaches.          Objective   Physical Exam    /64   Ht 5' 2" (1.575 m)   Wt 52.8 kg (116 lb 6.5 oz)   BMI 21.29 kg/m²     Gen: NAD  Resp: Normal respiratory effort  Breast: Symmetric, nontender.  No masses.  No skin changes.  No nipple discharge.   Abd: soft, NT  Pelvic: Normal-appearing external female genitalia.  No CMT.  Uterus small, mobile, nontender.  No adnexal masses or tenderness.    Ext: normal ROM  Psych: appropriate affect  Neuro: grossly intact         Assessment and Plan     Rosa Maria was seen today for well woman.    Diagnoses and all orders for this visit:    Well woman exam with routine gynecological exam    Screening for cervical cancer  -     Liquid-Based Pap Smear, Screening  -     HPV High Risk Genotypes, PCR          Plan:  Routine annual exam with pap smear and breast exam " today.  Declined STD screening.  Up to date on MMG and colonoscopy (repeat 2028).    Discussed HRT with patient.  As she actually feels better off treatment, will just monitor symptoms for now.  Will request records for Dexa and previous labs.    Offered hormone levels today, but as it would not affect treatment, declined.  Will reassess at next visit, if symptoms worsening or considering restarting treatment, can draw levels.    Encourage continued Vit D and healthy exercise for bone health.  Taking daily multivitamin as well.   Counseling done, precautions given, all questions answered.  RTC 1 year for annual, or prn.

## 2024-09-17 ENCOUNTER — PATIENT MESSAGE (OUTPATIENT)
Dept: FAMILY MEDICINE | Facility: CLINIC | Age: 49
End: 2024-09-17
Payer: COMMERCIAL

## 2024-09-17 DIAGNOSIS — Z00.00 ANNUAL PHYSICAL EXAM: Primary | ICD-10-CM

## 2024-09-25 ENCOUNTER — LAB VISIT (OUTPATIENT)
Dept: LAB | Facility: HOSPITAL | Age: 49
End: 2024-09-25
Attending: FAMILY MEDICINE
Payer: COMMERCIAL

## 2024-09-25 DIAGNOSIS — Z00.00 ANNUAL PHYSICAL EXAM: ICD-10-CM

## 2024-09-25 LAB
ALBUMIN SERPL BCP-MCNC: 4.3 G/DL (ref 3.5–5.2)
ALP SERPL-CCNC: 73 U/L (ref 55–135)
ALT SERPL W/O P-5'-P-CCNC: 9 U/L (ref 10–44)
ANION GAP SERPL CALC-SCNC: 9 MMOL/L (ref 8–16)
AST SERPL-CCNC: 17 U/L (ref 10–40)
BASOPHILS # BLD AUTO: 0.02 K/UL (ref 0–0.2)
BASOPHILS NFR BLD: 0.5 % (ref 0–1.9)
BILIRUB SERPL-MCNC: 0.2 MG/DL (ref 0.1–1)
BUN SERPL-MCNC: 14 MG/DL (ref 6–20)
CALCIUM SERPL-MCNC: 10.1 MG/DL (ref 8.7–10.5)
CHLORIDE SERPL-SCNC: 106 MMOL/L (ref 95–110)
CHOLEST SERPL-MCNC: 197 MG/DL (ref 120–199)
CHOLEST/HDLC SERPL: 2.4 {RATIO} (ref 2–5)
CO2 SERPL-SCNC: 26 MMOL/L (ref 23–29)
CREAT SERPL-MCNC: 1 MG/DL (ref 0.5–1.4)
DIFFERENTIAL METHOD BLD: ABNORMAL
EOSINOPHIL # BLD AUTO: 0 K/UL (ref 0–0.5)
EOSINOPHIL NFR BLD: 0.9 % (ref 0–8)
ERYTHROCYTE [DISTWIDTH] IN BLOOD BY AUTOMATED COUNT: 13.2 % (ref 11.5–14.5)
EST. GFR  (NO RACE VARIABLE): >60 ML/MIN/1.73 M^2
ESTIMATED AVG GLUCOSE: 103 MG/DL (ref 68–131)
FSH SERPL-ACNC: 74.19 MIU/ML
GLUCOSE SERPL-MCNC: 83 MG/DL (ref 70–110)
HBA1C MFR BLD: 5.2 % (ref 4–5.6)
HCT VFR BLD AUTO: 36.9 % (ref 37–48.5)
HDLC SERPL-MCNC: 81 MG/DL (ref 40–75)
HDLC SERPL: 41.1 % (ref 20–50)
HGB BLD-MCNC: 11.6 G/DL (ref 12–16)
IMM GRANULOCYTES # BLD AUTO: 0.01 K/UL (ref 0–0.04)
IMM GRANULOCYTES NFR BLD AUTO: 0.2 % (ref 0–0.5)
LDLC SERPL CALC-MCNC: 96.2 MG/DL (ref 63–159)
LYMPHOCYTES # BLD AUTO: 1.9 K/UL (ref 1–4.8)
LYMPHOCYTES NFR BLD: 43.2 % (ref 18–48)
MCH RBC QN AUTO: 29.9 PG (ref 27–31)
MCHC RBC AUTO-ENTMCNC: 31.4 G/DL (ref 32–36)
MCV RBC AUTO: 95 FL (ref 82–98)
MONOCYTES # BLD AUTO: 0.3 K/UL (ref 0.3–1)
MONOCYTES NFR BLD: 7.8 % (ref 4–15)
NEUTROPHILS # BLD AUTO: 2.1 K/UL (ref 1.8–7.7)
NEUTROPHILS NFR BLD: 47.4 % (ref 38–73)
NONHDLC SERPL-MCNC: 116 MG/DL
NRBC BLD-RTO: 0 /100 WBC
PLATELET # BLD AUTO: 257 K/UL (ref 150–450)
PMV BLD AUTO: 10.9 FL (ref 9.2–12.9)
POTASSIUM SERPL-SCNC: 3.9 MMOL/L (ref 3.5–5.1)
PROGEST SERPL-MCNC: 0.9 NG/ML
PROT SERPL-MCNC: 7.1 G/DL (ref 6–8.4)
RBC # BLD AUTO: 3.88 M/UL (ref 4–5.4)
SODIUM SERPL-SCNC: 141 MMOL/L (ref 136–145)
TRIGL SERPL-MCNC: 99 MG/DL (ref 30–150)
TSH SERPL DL<=0.005 MIU/L-ACNC: 1.06 UIU/ML (ref 0.4–4)
WBC # BLD AUTO: 4.38 K/UL (ref 3.9–12.7)

## 2024-09-25 PROCEDURE — 82671 ASSAY OF ESTROGENS: CPT | Performed by: NURSE PRACTITIONER

## 2024-09-25 PROCEDURE — 80061 LIPID PANEL: CPT | Performed by: NURSE PRACTITIONER

## 2024-09-25 PROCEDURE — 83036 HEMOGLOBIN GLYCOSYLATED A1C: CPT | Performed by: NURSE PRACTITIONER

## 2024-09-25 PROCEDURE — 83001 ASSAY OF GONADOTROPIN (FSH): CPT | Performed by: NURSE PRACTITIONER

## 2024-09-25 PROCEDURE — 80053 COMPREHEN METABOLIC PANEL: CPT | Performed by: NURSE PRACTITIONER

## 2024-09-25 PROCEDURE — 84144 ASSAY OF PROGESTERONE: CPT | Performed by: NURSE PRACTITIONER

## 2024-09-25 PROCEDURE — 85025 COMPLETE CBC W/AUTO DIFF WBC: CPT | Performed by: NURSE PRACTITIONER

## 2024-09-25 PROCEDURE — 84443 ASSAY THYROID STIM HORMONE: CPT | Performed by: NURSE PRACTITIONER

## 2024-09-27 ENCOUNTER — OFFICE VISIT (OUTPATIENT)
Dept: FAMILY MEDICINE | Facility: CLINIC | Age: 49
End: 2024-09-27
Payer: COMMERCIAL

## 2024-09-27 VITALS
OXYGEN SATURATION: 99 % | SYSTOLIC BLOOD PRESSURE: 100 MMHG | BODY MASS INDEX: 20.69 KG/M2 | HEART RATE: 70 BPM | HEIGHT: 62 IN | TEMPERATURE: 98 F | DIASTOLIC BLOOD PRESSURE: 62 MMHG | WEIGHT: 112.44 LBS

## 2024-09-27 DIAGNOSIS — G25.81 RESTLESS LEG: ICD-10-CM

## 2024-09-27 DIAGNOSIS — Z00.00 ANNUAL PHYSICAL EXAM: Primary | ICD-10-CM

## 2024-09-27 PROCEDURE — 99999 PR PBB SHADOW E&M-EST. PATIENT-LVL III: CPT | Mod: PBBFAC,,, | Performed by: FAMILY MEDICINE

## 2024-09-27 RX ORDER — ROPINIROLE 1 MG/1
1 TABLET, FILM COATED ORAL 2 TIMES DAILY
Qty: 180 TABLET | Refills: 3 | Status: SHIPPED | OUTPATIENT
Start: 2024-09-27

## 2024-10-01 LAB
ESTRADIOL SERPL HS-MCNC: 53 PG/ML
ESTROGEN SERPL CALC-MCNC: 83 PG/ML
ESTRONE SERPL-MCNC: 30 PG/ML

## 2024-12-23 ENCOUNTER — PATIENT MESSAGE (OUTPATIENT)
Dept: OBSTETRICS AND GYNECOLOGY | Facility: CLINIC | Age: 49
End: 2024-12-23

## 2024-12-23 ENCOUNTER — OFFICE VISIT (OUTPATIENT)
Dept: OBSTETRICS AND GYNECOLOGY | Facility: CLINIC | Age: 49
End: 2024-12-23
Payer: COMMERCIAL

## 2024-12-23 ENCOUNTER — PATIENT MESSAGE (OUTPATIENT)
Dept: OBSTETRICS AND GYNECOLOGY | Facility: CLINIC | Age: 49
End: 2024-12-23
Payer: COMMERCIAL

## 2024-12-23 VITALS — DIASTOLIC BLOOD PRESSURE: 54 MMHG | SYSTOLIC BLOOD PRESSURE: 96 MMHG | WEIGHT: 105.81 LBS | BODY MASS INDEX: 19.35 KG/M2

## 2024-12-23 DIAGNOSIS — N94.10 DYSPAREUNIA IN FEMALE: Primary | ICD-10-CM

## 2024-12-23 DIAGNOSIS — N76.0 ACUTE VAGINITIS: Primary | ICD-10-CM

## 2024-12-23 DIAGNOSIS — N95.2 VAGINAL ATROPHY: ICD-10-CM

## 2024-12-23 DIAGNOSIS — N94.10 DYSPAREUNIA IN FEMALE: ICD-10-CM

## 2024-12-23 PROCEDURE — 3044F HG A1C LEVEL LT 7.0%: CPT | Mod: CPTII,S$GLB,, | Performed by: PHYSICIAN ASSISTANT

## 2024-12-23 PROCEDURE — 1159F MED LIST DOCD IN RCRD: CPT | Mod: CPTII,S$GLB,, | Performed by: PHYSICIAN ASSISTANT

## 2024-12-23 PROCEDURE — 87491 CHLMYD TRACH DNA AMP PROBE: CPT | Performed by: PHYSICIAN ASSISTANT

## 2024-12-23 PROCEDURE — 3074F SYST BP LT 130 MM HG: CPT | Mod: CPTII,S$GLB,, | Performed by: PHYSICIAN ASSISTANT

## 2024-12-23 PROCEDURE — 3008F BODY MASS INDEX DOCD: CPT | Mod: CPTII,S$GLB,, | Performed by: PHYSICIAN ASSISTANT

## 2024-12-23 PROCEDURE — 0352U VAGINOSIS SCREEN BY DNA PROBE: CPT | Performed by: PHYSICIAN ASSISTANT

## 2024-12-23 PROCEDURE — 99214 OFFICE O/P EST MOD 30 MIN: CPT | Mod: S$GLB,,, | Performed by: PHYSICIAN ASSISTANT

## 2024-12-23 PROCEDURE — 3078F DIAST BP <80 MM HG: CPT | Mod: CPTII,S$GLB,, | Performed by: PHYSICIAN ASSISTANT

## 2024-12-23 PROCEDURE — 99999 PR PBB SHADOW E&M-EST. PATIENT-LVL II: CPT | Mod: PBBFAC,,, | Performed by: PHYSICIAN ASSISTANT

## 2024-12-23 RX ORDER — PRASTERONE 6.5 MG/1
6.5 INSERT VAGINAL DAILY
Qty: 90 EACH | Refills: 3 | Status: SHIPPED | OUTPATIENT
Start: 2024-12-23 | End: 2024-12-23

## 2024-12-23 RX ORDER — CLOTRIMAZOLE AND BETAMETHASONE DIPROPIONATE 10; .64 MG/G; MG/G
CREAM TOPICAL 2 TIMES DAILY
Qty: 15 G | Refills: 1 | Status: SHIPPED | OUTPATIENT
Start: 2024-12-23

## 2024-12-23 RX ORDER — FLUCONAZOLE 150 MG/1
150 TABLET ORAL ONCE
Qty: 1 TABLET | Refills: 1 | Status: SHIPPED | OUTPATIENT
Start: 2024-12-23 | End: 2024-12-23

## 2024-12-23 RX ORDER — PRASTERONE 6.5 MG/1
6.5 INSERT VAGINAL DAILY
Qty: 90 EACH | Refills: 3 | Status: SHIPPED | OUTPATIENT
Start: 2024-12-23 | End: 2025-12-23

## 2024-12-23 NOTE — PROGRESS NOTES
Rosa Maria Kay is a 49 y.o. female  presents with complaint of vaginal irritation for 4 days. Reports this occurs after intercourse. States this happened in March and lasted 2 weeks. She has had intercourse once between then and now with different partner, no symptoms at that time. . Denies nausea, vomiting, diarrhea, constipation, dysuria, dyspareunia, abdominal pain. She would like STD screening at this visit. No other concerns or complaints at this visit.     Past Medical History:   Diagnosis Date    Abnormal Pap smear of cervix 2017    CK done (Dr Cote @ [facility]/[place]) - normla paps after    Encounter for Essure implantation 2017    HPV in female 2017    Interstitial cystitis      [facility]    Puberty, precocious      Past Surgical History:   Procedure Laterality Date     SECTION      COLD KNIFE CONIZATION OF CERVIX      w/ ESSURE     COLONOSCOPY  2020    NORMAL. Repat 3 yrs --  DR Jessica Vogt     COLONOSCOPY N/A 2020    Procedure: COLONOSCOPY;  Surgeon: Jessica Vogt MD;  Location: [facility];  Service: Endoscopy;  Laterality: N/A;  CV-19 ORDERED    TUBAL LIGATION  Essure    VAGINAL BIRTH AFTER  SECTION       Social History     Tobacco Use    Smoking status: Never    Smokeless tobacco: Never   Substance Use Topics    Alcohol use: Yes     Comment: Occational    Drug use: Never     Family History   Problem Relation Name Age of Onset    Cancer Mother Radha 44        colon    Colon cancer Mother Radha     No Known Problems Father      Learning disabilities Brother Christopher     Mental illness Brother Christopher     No Known Problems Brother      Cancer Maternal Aunt Starr         Lymphoma ??     Colon cancer Paternal Aunt      No Known Problems Paternal Grandfather      Heart attack Paternal Grandmother      Heart disease Maternal Grandmother Krystina     Diabetes Maternal Grandfather García     Stroke Maternal Grandfather García     Breast cancer Neg Hx        OB History    Para Term  AB Living   2 2 1 1   2   SAB IAB Ectopic Multiple Live Births           2      # Outcome Date GA Lbr Syed/2nd Weight Sex Type Anes PTL Lv   2 Term  40w0d  3.43 kg (7 lb 9 oz) M    VANESA   1   34w0d  3.005 kg (6 lb 10 oz) M CS-LTranv   VANESA       BP (!) 96/54   Wt 48 kg (105 lb 13.1 oz)   LMP  (Approximate)   BMI 19.35 kg/m²     ROS:  GENERAL: No fever, chills, fatigability or weight loss.  VULVAR: No pain, no lesions and + itching.  VAGINAL: No relaxation, no abnormal bleeding and no lesions. + irritation  ABDOMEN: No abdominal pain. Denies nausea. Denies vomiting. No diarrhea. No constipation  BREAST: Denies pain. No lumps. No discharge.  URINARY: No incontinence, no nocturia, no frequency and no dysuria.  CARDIOVASCULAR: No chest pain. No shortness of breath. No leg cramps.  NEUROLOGICAL: No headaches. No vision changes.    PHYSICAL EXAM:  VULVA: normal appearing vulva with no masses, tenderness or lesions   VAGINA: atrophic appearing vagina with normal color. Scant amount of white discharge, no lesions   CERVIX: normal appearing cervix without discharge or lesions   UTERUS: uterus is normal size, shape, consistency and nontender   ADNEXA: normal adnexa in size, nontender and no masses    ASSESSMENT and PLAN:    ICD-10-CM ICD-9-CM    1. Acute vaginitis  N76.0 616.10 Vaginosis Screen by DNA Probe      C. trachomatis/N. gonorrhoeae by AMP DNA Carmenner; Cervicovaginal      2. Dyspareunia in female  N94.10 625.0 prasterone, dhea, (INTRAROSA) 6.5 mg Inst      DISCONTINUED: prasterone, dhea, (INTRAROSA) 6.5 mg Inst      3. Vaginal atrophy  N95.2 627.3 prasterone, dhea, (INTRAROSA) 6.5 mg Inst      DISCONTINUED: prasterone, dhea, (INTRAROSA) 6.5 mg Inst          Impression: vaginal atrophy  Plan:   Prescriptions as noted in orders  STD screening    Vaginitis Prevention:  - Avoiding feminine products such as deoderant soaps, body wash, bubble bath, douches,  scented toilet paper, deoderant tampons or pads, feminine wipes, chronic pad use, etc. If you wash with soap make sure its hypo allergic (free of added scents or colors)   - Avoiding other vulvovaginal irritants such as long hot baths, humidity, tight, synthetic clothing, chlorine and sitting around in wet bathing suits  - Wearing cotton underwear, avoiding thong underwear and no underwear to bed-wear loose cotton bottoms to bed   - Taking showers instead of baths and use a hair dryer on cool setting afterwards to dry  - Wearing cotton to exercise and shower immediately after exercise and change clothes  - Using polyurethane condoms without spermicide if sexually active and symptoms are triggered by intercourse  - Use laundry detergent without added perfumes or colors   - Do not have sexual intercourse until symptoms have gone away   - Increase your intake of probiotics--you can get these by eating yogurt/greek yogurt or by buying over the counter probiotic supplements     Probiotic Information:   Any probiotic you choose needs to have ALL of the following components (Each needs to be >10 colony forming units [CFUs]):   - L. Acidophilus  - L. Rhamnosus  - L. Fermentum       FOLLOW UP: PRN lack of improvement.  Violette Blair PA-C

## 2024-12-26 ENCOUNTER — PATIENT MESSAGE (OUTPATIENT)
Dept: OBSTETRICS AND GYNECOLOGY | Facility: CLINIC | Age: 49
End: 2024-12-26
Payer: COMMERCIAL

## 2024-12-26 LAB
BACTERIAL VAGINOSIS DNA: NOT DETECTED
C TRACH DNA SPEC QL NAA+PROBE: NOT DETECTED
CANDIDA GLABRATA/KRUSEI: NOT DETECTED
CANDIDA RRNA VAG QL PROBE: NOT DETECTED
N GONORRHOEA DNA SPEC QL NAA+PROBE: NOT DETECTED
TRICHOMONAS VAGINALIS: NOT DETECTED

## 2024-12-31 RX ORDER — ESTRADIOL 0.1 MG/G
CREAM VAGINAL
Qty: 42.5 G | Refills: 3 | Status: SHIPPED | OUTPATIENT
Start: 2024-12-31

## 2025-01-03 ENCOUNTER — OFFICE VISIT (OUTPATIENT)
Dept: OBSTETRICS AND GYNECOLOGY | Facility: CLINIC | Age: 50
End: 2025-01-03
Payer: COMMERCIAL

## 2025-01-03 VITALS
SYSTOLIC BLOOD PRESSURE: 104 MMHG | BODY MASS INDEX: 19.63 KG/M2 | HEIGHT: 62 IN | DIASTOLIC BLOOD PRESSURE: 64 MMHG | WEIGHT: 106.69 LBS

## 2025-01-03 DIAGNOSIS — N89.8 VAGINAL IRRITATION: Primary | ICD-10-CM

## 2025-01-03 PROCEDURE — 99213 OFFICE O/P EST LOW 20 MIN: CPT | Mod: S$GLB,,, | Performed by: OBSTETRICS & GYNECOLOGY

## 2025-01-03 PROCEDURE — 3074F SYST BP LT 130 MM HG: CPT | Mod: CPTII,S$GLB,, | Performed by: OBSTETRICS & GYNECOLOGY

## 2025-01-03 PROCEDURE — 3078F DIAST BP <80 MM HG: CPT | Mod: CPTII,S$GLB,, | Performed by: OBSTETRICS & GYNECOLOGY

## 2025-01-03 PROCEDURE — 87798 DETECT AGENT NOS DNA AMP: CPT | Performed by: OBSTETRICS & GYNECOLOGY

## 2025-01-03 PROCEDURE — 81515 NFCT DS BV&VAGINITIS DNA ALG: CPT | Performed by: OBSTETRICS & GYNECOLOGY

## 2025-01-03 PROCEDURE — 99999 PR PBB SHADOW E&M-EST. PATIENT-LVL III: CPT | Mod: PBBFAC,,, | Performed by: OBSTETRICS & GYNECOLOGY

## 2025-01-03 PROCEDURE — 3008F BODY MASS INDEX DOCD: CPT | Mod: CPTII,S$GLB,, | Performed by: OBSTETRICS & GYNECOLOGY

## 2025-01-03 PROCEDURE — 1159F MED LIST DOCD IN RCRD: CPT | Mod: CPTII,S$GLB,, | Performed by: OBSTETRICS & GYNECOLOGY

## 2025-01-03 NOTE — PROGRESS NOTES
"Subjective     Patient ID: Rosa Maria Kay is a 49 y.o. female.    Chief Complaint:  Painful [place]      History of Present Illness  HPI  49 y.o.  presents with c/o pain after intercourse.  States it is a vaginal burning sensation, no pelvic pain.  Not painful during intercourse, happens afterwards. No discharge.  Has tried diflucan and lotrisone cream with some improvement, but not completely better.  No other complaints today.      GYN & OB History  No LMP recorded. Patient is premenopausal.   Date of Last Pap: 2024    OB History    Para Term  AB Living   2 2 1 1   2   SAB IAB Ectopic Multiple Live Births           2      # Outcome Date GA Lbr Syed/2nd Weight Sex Type Anes PTL Lv   2 Term  40w0d  3.43 kg (7 lb 9 oz) M    VANESA   1   34w0d  3.005 kg (6 lb 10 oz) M CS-LTranv   VANESA       Review of Systems  Review of Systems   Constitutional:  Negative for chills and fever.   Respiratory:  Negative for shortness of breath.    Cardiovascular:  Negative for chest pain.   Gastrointestinal:  Negative for abdominal pain, constipation and diarrhea.   Genitourinary:  Positive for dyspareunia and vaginal pain. Negative for pelvic pain and vaginal discharge.   Musculoskeletal:  Negative for myalgias.   Neurological:  Negative for headaches.          Objective   Physical Exam    /64   Ht 5' 2" (1.575 m)   Wt 48.4 kg (106 lb 11.2 oz)   BMI 19.52 kg/m²     Gen: NAD  Resp: Normal respiratory effort  Abd: Soft, NT/ND  Pelvic: Normal-appearing external female genitalia.  No CMT.  Uterus small, mobile, nontender.  No adnexal masses or tenderness.    Ext: normal ROM  Psych: appropriate affect  Neuro: grossly intact         Assessment and Plan     Rosa Maria was seen today for painful intercourse.    Diagnoses and all orders for this visit:    Vaginal irritation  -     Vaginosis Screen by DNA Probe; Future  -     Ureaplasma PCR Vagina; Future  -     Ureaplasma PCR Vagina  -     " Vaginosis Screen by DNA Probe          Plan:  Vaginal cultures pending today.  Will await results before adding more treatment.  Can stop lotrisone cream for now - has used for 2 weeks.  Discussed boric acid suppositories, can also try probiotic daily.   Exam overall benign today.   Still does not desire HRT, recent estrogen level wnl.  Will repeat Dexa at annual.   Counseling done, precautions given, all questions answered.  RTC Aug for annual, or prn.

## 2025-01-04 LAB
BACTERIAL VAGINOSIS DNA: NOT DETECTED
CANDIDA GLABRATA/KRUSEI: NOT DETECTED
CANDIDA RRNA VAG QL PROBE: NOT DETECTED
TRICHOMONAS VAGINALIS: NOT DETECTED

## 2025-01-07 LAB
SPECIMEN SOURCE: NORMAL
U PARVUM DNA SPEC QL NAA+PROBE: NEGATIVE
U UREALYTICUM DNA SPEC QL NAA+PROBE: NEGATIVE

## 2025-01-13 ENCOUNTER — HOSPITAL ENCOUNTER (OUTPATIENT)
Dept: RADIOLOGY | Facility: HOSPITAL | Age: 50
Discharge: HOME | End: 2025-01-13
Attending: FAMILY MEDICINE
Payer: COMMERCIAL

## 2025-01-13 DIAGNOSIS — Z12.31 ENCOUNTER FOR SCREENING MAMMOGRAM FOR BREAST CANCER: ICD-10-CM

## 2025-01-13 PROCEDURE — 77067 SCR MAMMO BI INCL CAD: CPT | Mod: 26,,, | Performed by: RADIOLOGY

## 2025-01-13 PROCEDURE — 77063 BREAST TOMOSYNTHESIS BI: CPT | Mod: 26,,, | Performed by: RADIOLOGY

## 2025-01-13 PROCEDURE — 77063 BREAST TOMOSYNTHESIS BI: CPT | Mod: TC

## 2025-04-08 ENCOUNTER — TELEPHONE (OUTPATIENT)
Dept: OBSTETRICS AND GYNECOLOGY | Facility: CLINIC | Age: 50
End: 2025-04-08
Payer: COMMERCIAL

## 2025-06-04 ENCOUNTER — PATIENT MESSAGE (OUTPATIENT)
Dept: OBSTETRICS AND GYNECOLOGY | Facility: CLINIC | Age: 50
End: 2025-06-04
Payer: COMMERCIAL

## 2025-06-04 DIAGNOSIS — E28.319 PREMATURE MENOPAUSE: Primary | ICD-10-CM

## 2025-06-06 ENCOUNTER — TELEPHONE (OUTPATIENT)
Dept: PODIATRY | Facility: CLINIC | Age: 50
End: 2025-06-06
Payer: COMMERCIAL

## 2025-06-09 ENCOUNTER — OFFICE VISIT (OUTPATIENT)
Dept: PODIATRY | Facility: CLINIC | Age: 50
End: 2025-06-09
Payer: COMMERCIAL

## 2025-06-09 VITALS — BODY MASS INDEX: 19.63 KG/M2 | HEIGHT: 62 IN | WEIGHT: 106.69 LBS

## 2025-06-09 DIAGNOSIS — M21.619 BUNION: Primary | ICD-10-CM

## 2025-06-09 PROCEDURE — 1159F MED LIST DOCD IN RCRD: CPT | Mod: CPTII,S$GLB,, | Performed by: PODIATRIST

## 2025-06-09 PROCEDURE — 99203 OFFICE O/P NEW LOW 30 MIN: CPT | Mod: S$GLB,,, | Performed by: PODIATRIST

## 2025-06-09 PROCEDURE — 3008F BODY MASS INDEX DOCD: CPT | Mod: CPTII,S$GLB,, | Performed by: PODIATRIST

## 2025-06-09 PROCEDURE — 99999 PR PBB SHADOW E&M-EST. PATIENT-LVL III: CPT | Mod: PBBFAC,,, | Performed by: PODIATRIST

## 2025-06-10 NOTE — PROGRESS NOTES
Subjective:     Patient ID: Rosa Maria Kay is a 50 y.o. female.    Chief Complaint: Bunions (Right foot)    Rosa Maria is a 50 y.o. female who presents to the podiatry clinic  with complaint of  right foot pain. Onset of the symptoms was several weeks ago. Precipitating event: none known. Current symptoms include: ability to bear weight, but with some pain. Aggravating factors: any weight bearing. Symptoms have progressed to a point and plateaued. Patient has had no prior foot problems. Evaluation to date: none. Treatment to date: none.     Review of Systems   Constitutional: Negative for chills.   Cardiovascular:  Negative for chest pain and claudication.   Respiratory:  Negative for cough.    Skin:  Positive for color change, dry skin and nail changes.   Musculoskeletal:  Positive for joint pain.   Gastrointestinal:  Negative for nausea.   Neurological:  Positive for paresthesias. Negative for numbness.   Psychiatric/Behavioral:  The patient is not nervous/anxious.         Objective:     Physical Exam  Constitutional:       Appearance: She is well-developed.      Comments: Oriented to time, place, and person.   Cardiovascular:      Comments: DP and PT pulses are palpable bilaterally. 3 sec capillary refill time and toes and feet are warm to touch proximally .  There is  hair growth on the feet and toes b/l. There is no edema b/l. No spider veins or varicosities present b/l.     Musculoskeletal:      Comments: Equinus noted b/l ankles with < 10 deg DF noted. MMT 5/5 in DF/PF/Inv/Ev resistance with no reproduction of pain in any direction. Passive range of motion of ankle and pedal joints is painless b/l.  Decreased stride, station of gait.  apropulsive toe off.  Increased angle and base of gait.      Patient has hammertoes of digits 2-5 bilateral partially reducible without symptom today.     Visible and palpable bunion without pain at dorsomedial 1st metatarsal head right and left.  Hallux abducted right and left  partially reducible, tracks laterally without being track bound.  No ecchymosis, erythema, edema, or cardinal signs infection or signs of trauma same foot.     Fat pad atrophy to heels and met heads bilateral       Feet:      Right foot:      Skin integrity: No callus or dry skin.      Left foot:      Skin integrity: No callus or dry skin.   Lymphadenopathy:      Comments: Negative lymphadenopathy bilateral popliteal fossa and tarsal tunnel.   Skin:     Comments: No open lesions, lacerations or wounds noted.Interdigital spaces clean, dry and intact b/l. No erythema noted to b/l foot.  Nails normal color and trophic qualities.     Neurological:      Mental Status: She is alert.      Comments: Light touch, proprioception, and sharp/dull sensation are all intact bilaterally. Protective threshold with the Aberdeen-Wienstein monofilament is intact bilaterally.    Psychiatric:         Behavior: Behavior is cooperative.           Assessment:      Encounter Diagnosis   Name Primary?    Bunion Yes     Plan:     Rosa Maria was seen today for bunions.    Diagnoses and all orders for this visit:    Diego      I counseled the patient on her conditions, their implications and medical management.        Discussed conservative treatment with shoes of adequate dimensions, material, and style to alleviate symptoms and delay or prevent surgical intervention.    Information given on bunion splints    RTC PRN

## 2025-06-26 ENCOUNTER — APPOINTMENT (OUTPATIENT)
Dept: RADIOLOGY | Facility: CLINIC | Age: 50
End: 2025-06-26
Attending: OBSTETRICS & GYNECOLOGY
Payer: COMMERCIAL

## 2025-06-26 DIAGNOSIS — E28.319 PREMATURE MENOPAUSE: ICD-10-CM

## 2025-06-26 PROCEDURE — 77080 DXA BONE DENSITY AXIAL: CPT | Mod: 26,,, | Performed by: INTERNAL MEDICINE

## 2025-06-26 PROCEDURE — 77080 DXA BONE DENSITY AXIAL: CPT | Mod: TC,PO

## 2025-07-23 ENCOUNTER — HOSPITAL ENCOUNTER (EMERGENCY)
Facility: HOSPITAL | Age: 50
Discharge: HOME | End: 2025-07-23
Attending: STUDENT IN AN ORGANIZED HEALTH CARE EDUCATION/TRAINING PROGRAM
Payer: COMMERCIAL

## 2025-07-23 VITALS
WEIGHT: 107 LBS | TEMPERATURE: 99 F | BODY MASS INDEX: 19.57 KG/M2 | HEART RATE: 81 BPM | DIASTOLIC BLOOD PRESSURE: 50 MMHG | RESPIRATION RATE: 23 BRPM | SYSTOLIC BLOOD PRESSURE: 87 MMHG | OXYGEN SATURATION: 99 %

## 2025-07-23 DIAGNOSIS — N12 PYELONEPHRITIS: Primary | ICD-10-CM

## 2025-07-23 LAB
ABSOLUTE EOSINOPHIL (OHS): 0 K/UL
ABSOLUTE MONOCYTE (OHS): 0.54 K/UL (ref 0.3–1)
ABSOLUTE NEUTROPHIL COUNT (OHS): 8.37 K/UL (ref 1.8–7.7)
ALBUMIN SERPL BCP-MCNC: 4 G/DL (ref 3.5–5.2)
ALLENS TEST: NORMAL
ALP SERPL-CCNC: 90 UNIT/L (ref 40–150)
ALT SERPL W/O P-5'-P-CCNC: 8 UNIT/L (ref 10–44)
ANION GAP (OHS): 16 MMOL/L (ref 8–16)
AST SERPL-CCNC: 16 UNIT/L (ref 11–45)
B-HCG UR QL: NEGATIVE
BACTERIA #/AREA URNS AUTO: ABNORMAL /HPF
BASOPHILS # BLD AUTO: 0.02 K/UL
BASOPHILS NFR BLD AUTO: 0.2 %
BILIRUB SERPL-MCNC: 0.2 MG/DL (ref 0.1–1)
BILIRUB UR QL STRIP.AUTO: NEGATIVE
BUN SERPL-MCNC: 15 MG/DL (ref 6–20)
CALCIUM SERPL-MCNC: 9.8 MG/DL (ref 8.7–10.5)
CHLORIDE SERPL-SCNC: 100 MMOL/L (ref 95–110)
CLARITY UR: ABNORMAL
CO2 SERPL-SCNC: 22 MMOL/L (ref 23–29)
COLOR UR AUTO: YELLOW
CREAT SERPL-MCNC: 1.1 MG/DL (ref 0.5–1.4)
CTP QC/QA: YES
ERYTHROCYTE [DISTWIDTH] IN BLOOD BY AUTOMATED COUNT: 13.3 % (ref 11.5–14.5)
GFR SERPLBLD CREATININE-BSD FMLA CKD-EPI: >60 ML/MIN/1.73/M2
GLUCOSE SERPL-MCNC: 100 MG/DL (ref 70–110)
GLUCOSE UR QL STRIP: NEGATIVE
HCT VFR BLD AUTO: 32.3 % (ref 37–48.5)
HGB BLD-MCNC: 10.4 GM/DL (ref 12–16)
HGB UR QL STRIP: NEGATIVE
IMM GRANULOCYTES # BLD AUTO: 0.07 K/UL (ref 0–0.04)
IMM GRANULOCYTES NFR BLD AUTO: 0.7 % (ref 0–0.5)
KETONES UR QL STRIP: NEGATIVE
LDH SERPL L TO P-CCNC: 0.7 MMOL/L (ref 0.5–2.2)
LEUKOCYTE ESTERASE UR QL STRIP: ABNORMAL
LYMPHOCYTES # BLD AUTO: 0.66 K/UL (ref 1–4.8)
MCH RBC QN AUTO: 29.5 PG (ref 27–31)
MCHC RBC AUTO-ENTMCNC: 32.2 G/DL (ref 32–36)
MCV RBC AUTO: 92 FL (ref 82–98)
MICROSCOPIC COMMENT: ABNORMAL
NITRITE UR QL STRIP: NEGATIVE
NON-SQ EPI CELLS #/AREA URNS AUTO: 1 /HPF
NUCLEATED RBC (/100WBC) (OHS): 0 /100 WBC
PH UR STRIP: 8 [PH]
PLATELET # BLD AUTO: 284 K/UL (ref 150–450)
PMV BLD AUTO: 9.1 FL (ref 9.2–12.9)
POC MOLECULAR INFLUENZA A AGN: NEGATIVE
POC MOLECULAR INFLUENZA B AGN: NEGATIVE
POTASSIUM SERPL-SCNC: 4.1 MMOL/L (ref 3.5–5.1)
PROT SERPL-MCNC: 7.9 GM/DL (ref 6–8.4)
PROT UR QL STRIP: ABNORMAL
RBC # BLD AUTO: 3.53 M/UL (ref 4–5.4)
RBC #/AREA URNS AUTO: 4 /HPF (ref 0–4)
RELATIVE EOSINOPHIL (OHS): 0 %
RELATIVE LYMPHOCYTE (OHS): 6.8 % (ref 18–48)
RELATIVE MONOCYTE (OHS): 5.6 % (ref 4–15)
RELATIVE NEUTROPHIL (OHS): 86.7 % (ref 38–73)
SAMPLE: NORMAL
SARS-COV-2 RDRP RESP QL NAA+PROBE: NEGATIVE
SITE: NORMAL
SODIUM SERPL-SCNC: 138 MMOL/L (ref 136–145)
SP GR UR STRIP: 1.01
SQUAMOUS #/AREA URNS AUTO: 1 /HPF
UROBILINOGEN UR STRIP-ACNC: NEGATIVE EU/DL
WBC # BLD AUTO: 9.66 K/UL (ref 3.9–12.7)
WBC #/AREA URNS AUTO: >100 /HPF (ref 0–5)
WBC CLUMPS UR QL AUTO: ABNORMAL

## 2025-07-23 PROCEDURE — 96375 TX/PRO/DX INJ NEW DRUG ADDON: CPT

## 2025-07-23 PROCEDURE — 80053 COMPREHEN METABOLIC PANEL: CPT

## 2025-07-23 PROCEDURE — 87635 SARS-COV-2 COVID-19 AMP PRB: CPT | Performed by: STUDENT IN AN ORGANIZED HEALTH CARE EDUCATION/TRAINING PROGRAM

## 2025-07-23 PROCEDURE — 96361 HYDRATE IV INFUSION ADD-ON: CPT

## 2025-07-23 PROCEDURE — 93005 ELECTROCARDIOGRAM TRACING: CPT

## 2025-07-23 PROCEDURE — 87086 URINE CULTURE/COLONY COUNT: CPT

## 2025-07-23 PROCEDURE — 81025 URINE PREGNANCY TEST: CPT

## 2025-07-23 PROCEDURE — 25000003 PHARM REV CODE 250: Performed by: STUDENT IN AN ORGANIZED HEALTH CARE EDUCATION/TRAINING PROGRAM

## 2025-07-23 PROCEDURE — 96374 THER/PROPH/DIAG INJ IV PUSH: CPT

## 2025-07-23 PROCEDURE — 63600175 PHARM REV CODE 636 W HCPCS

## 2025-07-23 PROCEDURE — 99285 EMERGENCY DEPT VISIT HI MDM: CPT | Mod: 25

## 2025-07-23 PROCEDURE — 81003 URINALYSIS AUTO W/O SCOPE: CPT

## 2025-07-23 PROCEDURE — 85025 COMPLETE CBC W/AUTO DIFF WBC: CPT

## 2025-07-23 PROCEDURE — 25000003 PHARM REV CODE 250

## 2025-07-23 PROCEDURE — 87040 BLOOD CULTURE FOR BACTERIA: CPT

## 2025-07-23 PROCEDURE — 93010 ELECTROCARDIOGRAM REPORT: CPT | Mod: ,,, | Performed by: INTERNAL MEDICINE

## 2025-07-23 PROCEDURE — 83605 ASSAY OF LACTIC ACID: CPT

## 2025-07-23 PROCEDURE — 63600175 PHARM REV CODE 636 W HCPCS: Performed by: STUDENT IN AN ORGANIZED HEALTH CARE EDUCATION/TRAINING PROGRAM

## 2025-07-23 PROCEDURE — 87502 INFLUENZA DNA AMP PROBE: CPT

## 2025-07-23 RX ORDER — ONDANSETRON HYDROCHLORIDE 2 MG/ML
8 INJECTION, SOLUTION INTRAVENOUS
Status: COMPLETED | OUTPATIENT
Start: 2025-07-23 | End: 2025-07-23

## 2025-07-23 RX ORDER — ACETAMINOPHEN 500 MG
1000 TABLET ORAL
Status: COMPLETED | OUTPATIENT
Start: 2025-07-23 | End: 2025-07-23

## 2025-07-23 RX ORDER — ONDANSETRON 4 MG/1
4 TABLET, ORALLY DISINTEGRATING ORAL EVERY 6 HOURS PRN
Qty: 20 TABLET | Refills: 0 | Status: SHIPPED | OUTPATIENT
Start: 2025-07-23 | End: 2025-07-28

## 2025-07-23 RX ORDER — ONDANSETRON 4 MG/1
4 TABLET, ORALLY DISINTEGRATING ORAL
Status: DISCONTINUED | OUTPATIENT
Start: 2025-07-23 | End: 2025-07-23

## 2025-07-23 RX ORDER — CIPROFLOXACIN 500 MG/1
500 TABLET, FILM COATED ORAL 2 TIMES DAILY
Qty: 14 TABLET | Refills: 0 | Status: SHIPPED | OUTPATIENT
Start: 2025-07-23 | End: 2025-07-30

## 2025-07-23 RX ORDER — CEFTRIAXONE 1 G/1
1 INJECTION, POWDER, FOR SOLUTION INTRAMUSCULAR; INTRAVENOUS
Status: COMPLETED | OUTPATIENT
Start: 2025-07-23 | End: 2025-07-23

## 2025-07-23 RX ADMIN — SODIUM CHLORIDE 1000 ML: 9 INJECTION, SOLUTION INTRAVENOUS at 10:07

## 2025-07-23 RX ADMIN — ONDANSETRON 8 MG: 2 INJECTION INTRAMUSCULAR; INTRAVENOUS at 08:07

## 2025-07-23 RX ADMIN — CEFTRIAXONE 1 G: 1 INJECTION, POWDER, FOR SOLUTION INTRAMUSCULAR; INTRAVENOUS at 09:07

## 2025-07-23 RX ADMIN — SODIUM CHLORIDE, POTASSIUM CHLORIDE, SODIUM LACTATE AND CALCIUM CHLORIDE 1000 ML: 600; 310; 30; 20 INJECTION, SOLUTION INTRAVENOUS at 09:07

## 2025-07-23 RX ADMIN — ACETAMINOPHEN 1000 MG: 500 TABLET ORAL at 07:07

## 2025-07-24 ENCOUNTER — PATIENT MESSAGE (OUTPATIENT)
Dept: FAMILY MEDICINE | Facility: CLINIC | Age: 50
End: 2025-07-24
Payer: COMMERCIAL

## 2025-07-24 LAB — HOLD SPECIMEN: NORMAL

## 2025-07-24 NOTE — ED PROVIDER NOTES
Encounter Date: 2025       History     Chief Complaint   Patient presents with    Back Pain     To rt side, went to [facility] and was sent here for possible kidney stones,      50-year-old female presents for evaluation of intermittent aching right-sided low back pain, intermittent since Thursday, constant since this afternoon.  Advil had improved the pain, but did not work this afternoon.  She also has had intermittent nausea without vomiting.  Patient noticed a low-grade fever of 100 last night, but that went away with Advil, and then it recurred today. She denies urinary symptoms, bowel issues or vaginal bleeding/discharge.       Review of patient's allergies indicates:  No Known Allergies  Past Medical History:   Diagnosis Date    Abnormal Pap smear of cervix 2017    CKC done (Dr Cote @ [facility]/[place]) - normla paps after    Encounter for Essure implantation     HPV in female 2017    Interstitial cystitis     Dr. Wheeler    Puberty, precocious     Restless leg syndrome      Past Surgical History:   Procedure Laterality Date     SECTION      COLD KNIFE CONIZATION OF CERVIX      w/ ESSURE     COLONOSCOPY  2020    NORMAL. Repat 3 yrs --  DR Jessica Vogt     COLONOSCOPY N/A 2020    Procedure: COLONOSCOPY;  Surgeon: Jessica Vogt MD;  Location: [facility];  Service: Endoscopy;  Laterality: N/A;  CV-19 ORDERED    TUBAL LIGATION  Essure    VAGINAL BIRTH AFTER  SECTION       Family History   Problem Relation Name Age of Onset    Cancer Mother Radha 44        colon    Colon cancer Mother Radha     No Known Problems Father      Learning disabilities Brother Christopher     Mental illness Brother Christopher     No Known Problems Brother      Cancer Maternal Aunt Starr         Lymphoma ??     Colon cancer Paternal Aunt      No Known Problems Paternal Grandfather      Heart attack Paternal Grandmother      Heart disease Maternal Grandmother Krystina     Diabetes Maternal  Grandfather García     Stroke Maternal Grandfather García     Breast cancer Neg Hx       Social History[1]  Review of Systems   Genitourinary:  Positive for flank pain.   Musculoskeletal:  Positive for back pain.       Physical Exam     Initial Vitals [07/23/25 1953]   BP Pulse Resp Temp SpO2   102/60 110 18 (!) 102.6 °F (39.2 °C) 99 %      MAP       --         Physical Exam    Nursing note and vitals reviewed.  Constitutional: She appears well-developed and well-nourished. She is not diaphoretic.   HENT:   Head: Normocephalic and atraumatic. Mouth/Throat: Oropharynx is clear and moist.   Eyes: EOM are normal. Pupils are equal, round, and reactive to light. Right eye exhibits no discharge. Left eye exhibits no discharge.   Neck: No tracheal deviation present.   Normal range of motion.  Cardiovascular:  Normal rate, regular rhythm and intact distal pulses.           Pulmonary/Chest: No respiratory distress. She has no wheezes. She exhibits no tenderness.   Abdominal: Abdomen is soft. She exhibits no distension. There is no abdominal tenderness.   Musculoskeletal:         General: No tenderness or edema. Normal range of motion.      Cervical back: Normal range of motion.     Neurological: She is alert and oriented to person, place, and time. She has normal strength. No cranial nerve deficit or sensory deficit. GCS eye subscore is 4. GCS verbal subscore is 5. GCS motor subscore is 6.   Skin: Skin is warm and dry. No rash noted.   Psychiatric: She has a normal mood and affect. Her behavior is normal. Thought content normal.         ED Course   Procedures  Labs Reviewed   URINALYSIS, REFLEX TO URINE CULTURE - Abnormal       Result Value    Color, UA Yellow      Appearance, UA Hazy (*)     pH, UA 8.0      Spec Grav UA 1.015      Protein, UA Trace (*)     Glucose, UA Negative      Ketones, UA Negative      Bilirubin, UA Negative      Blood, UA Negative      Nitrites, UA Negative      Urobilinogen, UA Negative       Leukocyte Esterase, UA 3+ (*)    COMPREHENSIVE METABOLIC PANEL - Abnormal    Sodium 138      Potassium 4.1      Chloride 100      CO2 22 (*)     Glucose 100      BUN 15      Creatinine 1.1      Calcium 9.8      Protein Total 7.9      Albumin 4.0      Bilirubin Total 0.2      ALP 90      AST 16      ALT 8 (*)     Anion Gap 16      eGFR >60     CBC WITH DIFFERENTIAL - Abnormal    WBC 9.66      RBC 3.53 (*)     HGB 10.4 (*)     HCT 32.3 (*)     MCV 92      MCH 29.5      MCHC 32.2      RDW 13.3      Platelet Count 284      MPV 9.1 (*)     Nucleated RBC 0      Neut % 86.7 (*)     Lymph % 6.8 (*)     Mono % 5.6      Eos % 0.0      Basophil % 0.2      Imm Grans % 0.7 (*)     Neut # 8.37 (*)     Lymph # 0.66 (*)     Mono # 0.54      Eos # 0.00      Baso # 0.02      Imm Grans # 0.07 (*)    URINALYSIS MICROSCOPIC - Abnormal    RBC, UA 4      WBC, UA >100 (*)     WBC Clumps, UA Occasional (*)     Bacteria, UA Many (*)     Squamous Epithelial Cells, UA 1      Non-Squamous Epithelial Cells 1 (*)     Microscopic Comment       CULTURE, BLOOD - Normal    Blood Culture No Growth After 6 Hours     CULTURE, BLOOD - Normal    Blood Culture No Growth After 6 Hours     CULTURE, URINE   CBC W/ AUTO DIFFERENTIAL    Narrative:     The following orders were created for panel order CBC auto differential.  Procedure                               Abnormality         Status                     ---------                               -----------         ------                     CBC with Differential[[id number]]       Abnormal            Final result                 Please view results for these tests on the individual orders.   GREY TOP URINE HOLD   POCT URINE PREGNANCY    POC Preg Test, Ur Negative       Acceptable Yes     SARS-COV-2 RDRP GENE    POC Rapid COVID Negative       Acceptable Yes     POCT INFLUENZA A/B MOLECULAR    POC Molecular Influenza A Ag Negative      POC Molecular Influenza B Ag Negative        Acceptable Yes     ISTAT LACTATE    POC Lactate 0.70      Sample VENOUS      Site Other      Allens Test N/A            Imaging Results              CT Renal Stone Study ABD Pelvis WO (Final result)  Result time 07/23/25 21:49:15      Final result by Zeb Almaraz MD (07/23/25 21:49:15)                   Impression:      No acute intra-abdominal abnormalities identified.  No evidence of renal stones or obstructive uropathy.      Electronically signed by: Zeb Almaraz MD  Date:    07/23/2025  Time:    21:49               Narrative:    EXAMINATION:  CT RENAL STONE STUDY ABD PELVIS WO    CLINICAL HISTORY:  Flank pain, kidney stone suspected;    TECHNIQUE:  Low dose axial images, sagittal and coronal reformations were obtained from the lung bases to the pubic symphysis.  Contrast was not administered.    COMPARISON:  None    FINDINGS:  The visualized portion of the heart is unremarkable.  The lung bases are clear.    No significant hepatic abnormality seen on this noncontrast exam.  There is no intra-or extrahepatic biliary ductal dilatation.  The gallbladder is unremarkable.  The stomach, pancreas, spleen, and adrenal glands are unremarkable.    Kidneys show no evidence of stones or hydronephrosis. Ureters are unable to be tracked distally.  No obvious stones are seen along their expected courses.  Urinary bladder and uterus are unremarkable.  Bilateral Essure devices are seen.  No obvious adnexal abnormalities are appreciated.    Appendix is not identified.  Moderate volume retained stool is seen in the right colon.  The visualized loops of small and large bowel show no evidence of obstruction or inflammation.  No free air or free fluid.    Aorta tapers normally.    No acute osseous abnormality identified.  Bilateral L5 pars defects are seen resulting in minimal grade 1 anterolisthesis of L5 on S1.  Subcutaneous soft tissues show no significant abnormalities.                                        X-Ray Chest AP Portable (Final result)  Result time 07/23/25 20:18:44      Final result by Zeb Almaraz MD (07/23/25 20:18:44)                   Impression:      No acute cardiopulmonary process identified.      Electronically signed by: Zeb Almaraz MD  Date:    07/23/2025  Time:    20:18               Narrative:    EXAMINATION:  XR CHEST AP PORTABLE    CLINICAL HISTORY:  Sepsis;    TECHNIQUE:  Single frontal view of the chest was performed.    COMPARISON:  None    FINDINGS:  Cardiac silhouette is normal in size.  Lungs are symmetrically expanded.  No evidence of focal consolidative process, pneumothorax, or significant pleural effusion.  No acute osseous abnormality identified.                                       Medications   acetaminophen tablet 1,000 mg (1,000 mg Oral Given 7/23/25 1957)   ondansetron injection 8 mg (8 mg Intravenous Given 7/23/25 2037)   lactated ringers bolus 1,000 mL (0 mLs Intravenous Stopped 7/23/25 2212)   cefTRIAXone injection 1 g (1 g Intravenous Given 7/23/25 2117)   sodium chloride 0.9% bolus 1,000 mL 1,000 mL (1,000 mLs Intravenous New Bag 7/23/25 2215)     Medical Decision Making  50-year-old female presents for low back pain, fever, nausea, ongoing for a few days.  She was febrile here to 100.2 0.6, septic workup initiated.  Differential diagnosis includes sepsis, pyelonephritis, nephrolithiasis, TOA, appendicitis, perinephric abscess.  Patient received Toradol at prior medical facility, will give Tylenol here, 1 L IV fluids, labs, renal stone study.  Dispo pending workup and symptom control.    Amount and/or Complexity of Data Reviewed  Labs: ordered.  Radiology: ordered.    Risk  Prescription drug management.               ED Course as of 07/24/25 0416   Thu Jul 24, 2025   0414 POCT COVID-19 Rapid Screening [BS]   0414 POCT Influenza A/B Molecular [BS]   0414 Blood culture x two cultures. Draw prior to antibiotics. [BS]   0414 Comprehensive metabolic panel(!) [BS]  "  0414 Blood culture x two cultures. Draw prior to antibiotics. [BS]   0414 ISTAT Lactate [BS]   0414 POCT urine pregnancy [BS]   0414 CBC auto differential(!) [BS]   0414 Urinalysis Microscopic(!) [BS]   0414 Urinalysis, Reflex to Urine Culture Urine, Clean Catch(!) [BS]   0414 CT Renal Stone Study ABD Pelvis WO [BS]   0414 X-Ray Chest AP Portable [BS]   0414 Workup here most consistent with pyelonephritis.  Urine is consistent with urinary tract infection.  She is having systemic symptoms including fever and right flank pain.  CT renal stone study without evidence of kidney stone or other abnormality.  Other workup largely normal.  Will discharge with Cipro, outpatient follow-up and return precautions for worsening symptoms. [BS]      ED Course User Index  [BS] Cory Parikh MD    Sepsis Perfusion Assessment: "I attest a sepsis perfusion exam was performed within 6 hours of sepsis, severe sepsis, or septic shock presentation, following fluid resuscitation."                          Clinical Impression:  Final diagnoses:  [N12] Pyelonephritis (Primary)          ED Disposition Condition    Discharge Stable          ED Prescriptions       Medication Sig Dispense Start Date End Date Auth. Provider    ciprofloxacin HCl (CIPRO) 500 MG tablet Take 1 tablet (500 mg total) by mouth 2 (two) times daily. for 7 days 14 tablet 7/23/2025 7/30/2025 Cory Parikh MD    ondansetron (ZOFRAN-ODT) 4 MG TbDL Take 1 tablet (4 mg total) by mouth every 6 (six) hours as needed (nausea). 20 tablet 7/23/2025 7/28/2025 Cory Parikh MD          Follow-up Information       Follow up With Specialties Details Why Contact Info    Noris Fink MD Family Medicine Call in 2 days To set up a follow-up appointment, To recheck today's symptoms [address]  [place] [address]  [phone]      [facility] - Emergency Dept Emergency Medicine Go to  If symptoms worsen [address] [facility] - " [facility] [id number] [phone]                   [1]   Social History  Tobacco Use    Smoking status: Never    Smokeless tobacco: Never   Substance Use Topics    Alcohol use: Yes     Comment: Occational    Drug use: Never        Cory Parikh MD  07/24/25 0416

## 2025-07-25 LAB — BACTERIA UR CULT: ABNORMAL

## 2025-07-26 LAB
OHS QRS DURATION: 74 MS
OHS QTC CALCULATION: 425 MS

## 2025-07-27 ENCOUNTER — PATIENT MESSAGE (OUTPATIENT)
Dept: FAMILY MEDICINE | Facility: CLINIC | Age: 50
End: 2025-07-27
Payer: COMMERCIAL

## 2025-07-28 LAB
BACTERIA BLD CULT: NORMAL
BACTERIA BLD CULT: NORMAL

## 2025-08-01 ENCOUNTER — OFFICE VISIT (OUTPATIENT)
Dept: FAMILY MEDICINE | Facility: CLINIC | Age: 50
End: 2025-08-01
Payer: COMMERCIAL

## 2025-08-01 VITALS
HEIGHT: 62 IN | TEMPERATURE: 99 F | OXYGEN SATURATION: 98 % | WEIGHT: 108.94 LBS | DIASTOLIC BLOOD PRESSURE: 58 MMHG | SYSTOLIC BLOOD PRESSURE: 100 MMHG | RESPIRATION RATE: 16 BRPM | HEART RATE: 68 BPM | BODY MASS INDEX: 20.05 KG/M2

## 2025-08-01 DIAGNOSIS — Z09 HOSPITAL DISCHARGE FOLLOW-UP: Primary | ICD-10-CM

## 2025-08-01 DIAGNOSIS — Z87.448 HISTORY OF ACUTE PYELONEPHRITIS: ICD-10-CM

## 2025-08-01 LAB
BACTERIA #/AREA URNS AUTO: NORMAL /HPF
BILIRUB SERPL-MCNC: NEGATIVE MG/DL
BILIRUB UR QL STRIP.AUTO: NEGATIVE
BLOOD URINE, POC: NEGATIVE
CLARITY UR: CLEAR
CLARITY, POC UA: CLEAR
COLOR UR AUTO: COLORLESS
COLOR, POC UA: YELLOW
GLUCOSE UR QL STRIP: NEGATIVE
GLUCOSE UR QL STRIP: NEGATIVE
HGB UR QL STRIP: NEGATIVE
KETONES UR QL STRIP: NEGATIVE
KETONES UR QL STRIP: NEGATIVE
LEUKOCYTE ESTERASE UR QL STRIP: NEGATIVE
LEUKOCYTE ESTERASE URINE, POC: ABNORMAL
MICROSCOPIC COMMENT: NORMAL
NITRITE UR QL STRIP: NEGATIVE
NITRITE, POC UA: NEGATIVE
PH UR STRIP: 6 [PH]
PH, POC UA: 5
PROT UR QL STRIP: NEGATIVE
PROTEIN, POC: ABNORMAL
RBC #/AREA URNS AUTO: 0 /HPF (ref 0–4)
SP GR UR STRIP: 1
SPECIFIC GRAVITY, POC UA: 1.01
UROBILINOGEN UR STRIP-ACNC: NEGATIVE EU/DL
UROBILINOGEN, POC UA: NEGATIVE
WBC #/AREA URNS AUTO: 0 /HPF (ref 0–5)

## 2025-08-01 PROCEDURE — 87086 URINE CULTURE/COLONY COUNT: CPT | Performed by: NURSE PRACTITIONER

## 2025-08-01 PROCEDURE — 81003 URINALYSIS AUTO W/O SCOPE: CPT | Performed by: NURSE PRACTITIONER

## 2025-08-01 PROCEDURE — 99999 PR PBB SHADOW E&M-EST. PATIENT-LVL IV: CPT | Mod: PBBFAC,,, | Performed by: NURSE PRACTITIONER

## 2025-08-01 NOTE — PROGRESS NOTES
Subjective:      Patient ID: Rosa Maria Kay is a 50 y.o. female.  New to me but seen previously in clinic by a fellow provider. Pt presents to clinic for ER f/u, see course below. Treated for pyelo with cipro which she completed without complication and reports no acute complaints today.       Encounter Date: 7/23/2025  Chief Complaint: Patient presents with Back Pain  To rt side, went to [facility] and was sent here for possible kidney stones,   50-year-old female presents for evaluation of intermittent aching right-sided low back pain, intermittent since Thursday, constant since this afternoon.  Advil had improved the pain, but did not work this afternoon.  She also has had intermittent nausea without vomiting.  Patient noticed a low-grade fever of 100 last night, but that went away with Advil, and then it recurred today. She denies urinary symptoms, bowel issues or vaginal bleeding/discharge.      ED Course  Procedures  Labs Reviewed  URINALYSIS, REFLEX TO URINE CULTURE - Abnormal      Result Value     Color, UA Yellow       Appearance, UA Hazy (*)      pH, UA 8.0       Spec Grav UA 1.015       Protein, UA Trace (*)      Glucose, UA Negative       Ketones, UA Negative       Bilirubin, UA Negative       Blood, UA Negative       Nitrites, UA Negative       Urobilinogen, UA Negative       Leukocyte Esterase, UA 3+ (*)    COMPREHENSIVE METABOLIC PANEL - Abnormal    Sodium 138       Potassium 4.1       Chloride 100       CO2 22 (*)      Glucose 100       BUN 15       Creatinine 1.1       Calcium 9.8       Protein Total 7.9       Albumin 4.0       Bilirubin Total 0.2       ALP 90       AST 16       ALT 8 (*)      Anion Gap 16       eGFR >60     CBC WITH DIFFERENTIAL - Abnormal    WBC 9.66       RBC 3.53 (*)      HGB 10.4 (*)      HCT 32.3 (*)      MCV 92       MCH 29.5       MCHC 32.2       RDW 13.3       Platelet Count 284       MPV 9.1 (*)      Nucleated RBC 0       Neut % 86.7 (*)      Lymph % 6.8 (*)       Mono % 5.6       Eos % 0.0       Basophil % 0.2       Imm Grans % 0.7 (*)      Neut # 8.37 (*)      Lymph # 0.66 (*)      Mono # 0.54       Eos # 0.00       Baso # 0.02       Imm Grans # 0.07 (*)    URINALYSIS MICROSCOPIC - Abnormal    RBC, UA 4       WBC, UA >100 (*)      WBC Clumps, UA Occasional (*)      Bacteria, UA Many (*)      Squamous Epithelial Cells, UA 1       Non-Squamous Epithelial Cells 1 (*)      Microscopic Comment       CULTURE, BLOOD - Normal    Blood Culture No Growth After 6 Hours     CULTURE, BLOOD - Normal    Blood Culture No Growth After 6 Hours     CULTURE, URINE  CBC W/ AUTO DIFFERENTIAL  POCT URINE PREGNANCY    POC Preg Test, Ur Negative        Acceptable Yes     SARS-COV-2 RDRP GENE    POC Rapid COVID Negative        Acceptable Yes     POCT INFLUENZA A/B MOLECULAR    POC Molecular Influenza A Ag Negative       POC Molecular Influenza B Ag Negative        Acceptable Yes     ISTAT LACTATE    POC Lactate 0.70       Sample VENOUS       Site Other       Allens Test N/A       Imaging Results   EXAMINATION: CT RENAL STONE STUDY ABD PELVIS WO  FINDINGS:  The visualized portion of the heart is unremarkable.  The lung bases are clear.  No significant hepatic abnormality seen on this noncontrast exam.  There is no intra-or extrahepatic biliary ductal dilatation.  The gallbladder is unremarkable.  The stomach, pancreas, spleen, and adrenal glands are unremarkable.  Kidneys show no evidence of stones or hydronephrosis. Ureters are unable to be tracked distally.  No obvious stones are seen along their expected courses.  Urinary bladder and uterus are unremarkable.  Bilateral Essure devices are seen.  No obvious adnexal abnormalities are appreciated.  Appendix is not identified.  Moderate volume retained stool is seen in the right colon.  The visualized loops of small and large bowel show no evidence of obstruction or inflammation.  No free air or free  fluid.  Aorta tapers normally.  No acute osseous abnormality identified.  Bilateral L5 pars defects are seen resulting in minimal grade 1 anterolisthesis of L5 on S1.  Subcutaneous soft tissues show no significant abnormalities.      EXAMINATION: XR CHEST AP PORTABLE  FINDINGS:  Cardiac silhouette is normal in size.  Lungs are symmetrically expanded.  No evidence of focal consolidative process, pneumothorax, or significant pleural effusion.  No acute osseous abnormality identified.    Medications  acetaminophen tablet 1,000 mg (1,000 mg Oral Given 7/23/25 1957)  ondansetron injection 8 mg (8 mg Intravenous Given 7/23/25 2037)  lactated ringers bolus 1,000 mL (0 mLs Intravenous Stopped 7/23/25 2212)  cefTRIAXone injection 1 g (1 g Intravenous Given 7/23/25 2117)  sodium chloride 0.9% bolus 1,000 mL 1,000 mL (1,000 mLs Intravenous New Bag 7/23/25 2215)     Medical Decision Making  50-year-old female presents for low back pain, fever, nausea, ongoing for a few days.  She was febrile here to 100.2 0.6, septic workup initiated.  Differential diagnosis includes sepsis, pyelonephritis, nephrolithiasis, TOA, appendicitis, perinephric abscess.  Patient received Toradol at prior medical facility, will give Tylenol here, 1 L IV fluids, labs, renal stone study.  Dispo pending workup and symptom control.    ED Course as of 07/24/25 0416  Thu Jul 24, 2025  0414 POCT COVID-19 Rapid Screening [BS]  0414 POCT Influenza A/B Molecular [BS]  0414 Blood culture x two cultures. Draw prior to antibiotics. [BS]  0414 Comprehensive metabolic panel(!) [BS]  0414 Blood culture x two cultures. Draw prior to antibiotics. [BS]  0414 ISTAT Lactate [BS]  0414 POCT urine pregnancy [BS]  0414 CBC auto differential(!) [BS]  0414 Urinalysis Microscopic(!) [BS]  0414 Urinalysis, Reflex to Urine Culture Urine, Clean Catch(!) [BS]  0414 CT Renal Stone Study ABD Pelvis WO [BS]  0414 X-Ray Chest AP Portable [BS]  0414 Workup here most consistent with  "pyelonephritis.  Urine is consistent with urinary tract infection.  She is having systemic symptoms including fever and right flank pain.  CT renal stone study without evidence of kidney stone or other abnormality.  Other workup largely normal.  Will discharge with Cipro, outpatient follow-up and return precautions for worsening symptoms. [BS]    Clinical Impression:  Final diagnoses:  [N12] Pyelonephritis (Primary)  Review of Systems   Constitutional:  Negative for activity change, appetite change, fatigue, fever, night sweats and unexpected weight change.   Respiratory:  Negative for chest tightness and shortness of breath.    Cardiovascular:  Negative for chest pain and palpitations.   Gastrointestinal:  Negative for abdominal pain, constipation, diarrhea, nausea and vomiting.   Genitourinary:  Negative for bladder incontinence, decreased urine volume, difficulty urinating, dysuria, enuresis, flank pain, frequency, hematuria, menstrual irregularity, menstrual problem, nocturia, pelvic pain and urgency.   Musculoskeletal:  Negative for arthralgias, back pain, gait problem and myalgias.   Integumentary:  Negative for rash.   Neurological:  Negative for weakness and headaches.   All other systems reviewed and are negative.      Objective:     Vitals:    08/01/25 1358   BP: (!) 100/58   Pulse: 68   Resp: 16   Temp: 98.5 °F (36.9 °C)   TempSrc: Oral   SpO2: 98%   Weight: 49.4 kg (108 lb 14.5 oz)   Height: 5' 2" (1.575 m)     Physical Exam  Vitals and nursing note reviewed.   Constitutional:       General: She is not in acute distress.     Appearance: Normal appearance. She is well-developed and well-groomed. She is not ill-appearing.   HENT:      Head: Normocephalic and atraumatic.      Right Ear: External ear normal.      Left Ear: External ear normal.      Nose: Nose normal.      Mouth/Throat:      Lips: Pink.      Mouth: Mucous membranes are moist.   Eyes:      General: Lids are normal. Vision grossly intact. Gaze " aligned appropriately.      Conjunctiva/sclera: Conjunctivae normal.      Pupils: Pupils are equal, round, and reactive to light.   Neck:      Trachea: Phonation normal.   Cardiovascular:      Rate and Rhythm: Normal rate and regular rhythm.      Heart sounds: Normal heart sounds.   Pulmonary:      Effort: Pulmonary effort is normal. No accessory muscle usage or respiratory distress.      Breath sounds: Normal breath sounds and air entry.   Abdominal:      General: Abdomen is flat. Bowel sounds are normal. There is no distension.      Palpations: Abdomen is soft.      Tenderness: There is no abdominal tenderness.   Musculoskeletal:      Cervical back: Neck supple.      Right lower leg: No edema.      Left lower leg: No edema.   Skin:     General: Skin is warm and dry.      Findings: No rash.   Neurological:      General: No focal deficit present.      Mental Status: She is alert and oriented to person, place, and time. Mental status is at baseline.      Sensory: Sensation is intact.      Motor: Motor function is intact.      Coordination: Coordination is intact.      Gait: Gait is intact.   Psychiatric:         Attention and Perception: Attention and perception normal.         Mood and Affect: Mood and affect normal.         Speech: Speech normal.         Behavior: Behavior normal. Behavior is cooperative.         Thought Content: Thought content normal.         Cognition and Memory: Cognition and memory normal.         Judgment: Judgment normal.   Results for orders placed or performed in visit on 08/01/25   POCT URINE DIPSTICK WITHOUT MICROSCOPE    Collection Time: 08/01/25  2:17 PM   Result Value Ref Range    Glucose, UA negative     Bilirubin, POC negative     Ketones, UA negative     Spec Grav UA 1.010     Blood, UA negative     pH, UA 5     Protein, POC trace     Urobilinogen, UA negative     Nitrite, UA negative     WBC, UA trace     Color, UA Yellow     Clarity, UA Clear      Assessment and Plan:     1.  Hospital discharge follow-up (Primary)  Symptoms resolved, continues to increase fluid intake   - POCT URINE DIPSTICK WITHOUT MICROSCOPE  - Urine culture  - Urinalysis Microscopic  - Urinalysis    2. History of acute pyelonephritis  Pt should call us if she has:  Symptoms that last longer than 3 days after taking the antibiotics.  Symptoms come back after resolving.  Fever (> 100.5'F) while on antibiotics.  Pink, red, or brown color to urine after 3 days.  Ways to prevent future infections:  Urinate after having vaginal intercourse.  Don't go long periods of time without urinating.  Wipe front to back.  - POCT URINE DIPSTICK WITHOUT MICROSCOPE  - Urine culture  - Urinalysis Microscopic  - Urinalysis          PAMELA Edouard, FNP-C  Family/Internal Medicine  [facility]

## 2025-08-02 LAB — BACTERIA UR CULT: NORMAL

## 2025-08-22 ENCOUNTER — OFFICE VISIT (OUTPATIENT)
Dept: OBSTETRICS AND GYNECOLOGY | Facility: CLINIC | Age: 50
End: 2025-08-22
Payer: COMMERCIAL

## 2025-08-22 VITALS
WEIGHT: 110.25 LBS | HEIGHT: 62 IN | BODY MASS INDEX: 20.29 KG/M2 | DIASTOLIC BLOOD PRESSURE: 65 MMHG | SYSTOLIC BLOOD PRESSURE: 89 MMHG

## 2025-08-22 DIAGNOSIS — N95.1 MENOPAUSAL SYMPTOMS: ICD-10-CM

## 2025-08-22 DIAGNOSIS — Z01.419 WELL WOMAN EXAM WITH ROUTINE GYNECOLOGICAL EXAM: Primary | ICD-10-CM

## 2025-08-22 PROCEDURE — 99999 PR PBB SHADOW E&M-EST. PATIENT-LVL III: CPT | Mod: PBBFAC,,, | Performed by: OBSTETRICS & GYNECOLOGY

## 2025-08-22 PROCEDURE — 3078F DIAST BP <80 MM HG: CPT | Mod: CPTII,S$GLB,, | Performed by: OBSTETRICS & GYNECOLOGY

## 2025-08-22 PROCEDURE — 3074F SYST BP LT 130 MM HG: CPT | Mod: CPTII,S$GLB,, | Performed by: OBSTETRICS & GYNECOLOGY

## 2025-08-22 PROCEDURE — 1159F MED LIST DOCD IN RCRD: CPT | Mod: CPTII,S$GLB,, | Performed by: OBSTETRICS & GYNECOLOGY

## 2025-08-22 PROCEDURE — 99396 PREV VISIT EST AGE 40-64: CPT | Mod: S$GLB,,, | Performed by: OBSTETRICS & GYNECOLOGY

## 2025-08-22 PROCEDURE — 3008F BODY MASS INDEX DOCD: CPT | Mod: CPTII,S$GLB,, | Performed by: OBSTETRICS & GYNECOLOGY

## 2025-08-22 RX ORDER — PROGESTERONE 100 MG/1
100 CAPSULE ORAL NIGHTLY
Qty: 30 CAPSULE | Refills: 3 | Status: SHIPPED | OUTPATIENT
Start: 2025-08-22 | End: 2026-08-22

## 2025-08-27 ENCOUNTER — PATIENT MESSAGE (OUTPATIENT)
Dept: OBSTETRICS AND GYNECOLOGY | Facility: CLINIC | Age: 50
End: 2025-08-27
Payer: COMMERCIAL

## 2025-09-02 DIAGNOSIS — G25.81 RESTLESS LEG: ICD-10-CM

## 2025-09-02 RX ORDER — ROPINIROLE 1 MG/1
1 TABLET, FILM COATED ORAL 2 TIMES DAILY
Qty: 60 TABLET | Refills: 0 | OUTPATIENT
Start: 2025-09-02

## 2025-09-04 RX ORDER — ROPINIROLE 1 MG/1
1 TABLET, FILM COATED ORAL 2 TIMES DAILY
Qty: 180 TABLET | Refills: 0 | Status: SHIPPED | OUTPATIENT
Start: 2025-09-04